# Patient Record
Sex: FEMALE | Race: OTHER | Employment: FULL TIME | URBAN - METROPOLITAN AREA
[De-identification: names, ages, dates, MRNs, and addresses within clinical notes are randomized per-mention and may not be internally consistent; named-entity substitution may affect disease eponyms.]

---

## 2018-02-25 ENCOUNTER — HOSPITAL ENCOUNTER (INPATIENT)
Age: 63
LOS: 9 days | Discharge: HOME | DRG: 885 | End: 2018-03-06
Attending: PSYCHIATRY & NEUROLOGY | Admitting: PSYCHIATRY & NEUROLOGY
Payer: COMMERCIAL

## 2018-02-25 ENCOUNTER — APPOINTMENT (OUTPATIENT)
Dept: CT IMAGING | Age: 63
End: 2018-02-25
Attending: EMERGENCY MEDICINE
Payer: MEDICAID

## 2018-02-25 ENCOUNTER — HOSPITAL ENCOUNTER (EMERGENCY)
Age: 63
Discharge: PSYCHIATRIC OR SUBSTANCE USE FACILITY | End: 2018-02-25
Attending: EMERGENCY MEDICINE
Payer: MEDICAID

## 2018-02-25 VITALS
OXYGEN SATURATION: 100 % | DIASTOLIC BLOOD PRESSURE: 70 MMHG | HEART RATE: 78 BPM | RESPIRATION RATE: 11 BRPM | SYSTOLIC BLOOD PRESSURE: 114 MMHG | HEIGHT: 66 IN | WEIGHT: 170 LBS | TEMPERATURE: 98.6 F | BODY MASS INDEX: 27.32 KG/M2

## 2018-02-25 DIAGNOSIS — E87.6 HYPOKALEMIA: ICD-10-CM

## 2018-02-25 DIAGNOSIS — F22 PARANOIA (PSYCHOSIS) (HCC): Primary | ICD-10-CM

## 2018-02-25 DIAGNOSIS — R03.0 ELEVATED BLOOD PRESSURE READING: ICD-10-CM

## 2018-02-25 PROBLEM — F29 PSYCHOTIC DISORDER (HCC): Status: ACTIVE | Noted: 2018-02-25

## 2018-02-25 LAB
ALBUMIN SERPL-MCNC: 3.8 G/DL (ref 3.5–5)
ALBUMIN/GLOB SERPL: 0.9 {RATIO} (ref 1.1–2.2)
ALP SERPL-CCNC: 40 U/L (ref 45–117)
ALT SERPL-CCNC: 30 U/L (ref 12–78)
AMPHET UR QL SCN: NEGATIVE
ANION GAP SERPL CALC-SCNC: 8 MMOL/L (ref 5–15)
APAP SERPL-MCNC: <2 UG/ML (ref 10–30)
APPEARANCE UR: CLEAR
AST SERPL-CCNC: 22 U/L (ref 15–37)
BACTERIA URNS QL MICRO: NEGATIVE /HPF
BARBITURATES UR QL SCN: NEGATIVE
BASOPHILS # BLD: 0 K/UL (ref 0–0.1)
BASOPHILS NFR BLD: 1 % (ref 0–1)
BENZODIAZ UR QL: NEGATIVE
BILIRUB SERPL-MCNC: 0.6 MG/DL (ref 0.2–1)
BILIRUB UR QL: NEGATIVE
BUN SERPL-MCNC: 12 MG/DL (ref 6–20)
BUN/CREAT SERPL: 11 (ref 12–20)
CALCIUM SERPL-MCNC: 8.9 MG/DL (ref 8.5–10.1)
CANNABINOIDS UR QL SCN: NEGATIVE
CHLORIDE SERPL-SCNC: 107 MMOL/L (ref 97–108)
CO2 SERPL-SCNC: 27 MMOL/L (ref 21–32)
COCAINE UR QL SCN: NEGATIVE
COLOR UR: ABNORMAL
CREAT SERPL-MCNC: 1.14 MG/DL (ref 0.55–1.02)
DIFFERENTIAL METHOD BLD: NORMAL
DRUG SCRN COMMENT,DRGCM: NORMAL
EOSINOPHIL # BLD: 0 K/UL (ref 0–0.4)
EOSINOPHIL NFR BLD: 0 % (ref 0–7)
EPITH CASTS URNS QL MICRO: ABNORMAL /LPF
ERYTHROCYTE [DISTWIDTH] IN BLOOD BY AUTOMATED COUNT: 12.9 % (ref 11.5–14.5)
ETHANOL SERPL-MCNC: <10 MG/DL
GLOBULIN SER CALC-MCNC: 4.1 G/DL (ref 2–4)
GLUCOSE SERPL-MCNC: 91 MG/DL (ref 65–100)
GLUCOSE UR STRIP.AUTO-MCNC: NEGATIVE MG/DL
HCT VFR BLD AUTO: 38.8 % (ref 35–47)
HGB BLD-MCNC: 13 G/DL (ref 11.5–16)
HGB UR QL STRIP: NEGATIVE
HYALINE CASTS URNS QL MICRO: ABNORMAL /LPF (ref 0–5)
IMM GRANULOCYTES # BLD: 0 K/UL (ref 0–0.04)
IMM GRANULOCYTES NFR BLD AUTO: 0 % (ref 0–0.5)
KETONES UR QL STRIP.AUTO: ABNORMAL MG/DL
LEUKOCYTE ESTERASE UR QL STRIP.AUTO: ABNORMAL
LYMPHOCYTES # BLD: 1 K/UL (ref 0.8–3.5)
LYMPHOCYTES NFR BLD: 22 % (ref 12–49)
MCH RBC QN AUTO: 32.7 PG (ref 26–34)
MCHC RBC AUTO-ENTMCNC: 33.5 G/DL (ref 30–36.5)
MCV RBC AUTO: 97.5 FL (ref 80–99)
METHADONE UR QL: NEGATIVE
MONOCYTES # BLD: 0.3 K/UL (ref 0–1)
MONOCYTES NFR BLD: 6 % (ref 5–13)
NEUTS SEG # BLD: 3.1 K/UL (ref 1.8–8)
NEUTS SEG NFR BLD: 72 % (ref 32–75)
NITRITE UR QL STRIP.AUTO: NEGATIVE
NRBC # BLD: 0 K/UL (ref 0–0.01)
NRBC BLD-RTO: 0 PER 100 WBC
OPIATES UR QL: NEGATIVE
PCP UR QL: NEGATIVE
PH UR STRIP: 5.5 [PH] (ref 5–8)
PLATELET # BLD AUTO: 246 K/UL (ref 150–400)
PMV BLD AUTO: 10.6 FL (ref 8.9–12.9)
POTASSIUM SERPL-SCNC: 3.4 MMOL/L (ref 3.5–5.1)
PROT SERPL-MCNC: 7.9 G/DL (ref 6.4–8.2)
PROT UR STRIP-MCNC: ABNORMAL MG/DL
RBC # BLD AUTO: 3.98 M/UL (ref 3.8–5.2)
RBC #/AREA URNS HPF: ABNORMAL /HPF (ref 0–5)
SALICYLATES SERPL-MCNC: <1.7 MG/DL (ref 2.8–20)
SODIUM SERPL-SCNC: 142 MMOL/L (ref 136–145)
SP GR UR REFRACTOMETRY: 1.01 (ref 1–1.03)
TSH SERPL DL<=0.05 MIU/L-ACNC: 1.4 UIU/ML (ref 0.36–3.74)
UA: UC IF INDICATED,UAUC: ABNORMAL
UROBILINOGEN UR QL STRIP.AUTO: 0.2 EU/DL (ref 0.2–1)
WBC # BLD AUTO: 4.4 K/UL (ref 3.6–11)
WBC URNS QL MICRO: ABNORMAL /HPF (ref 0–4)

## 2018-02-25 PROCEDURE — 74011250637 HC RX REV CODE- 250/637: Performed by: EMERGENCY MEDICINE

## 2018-02-25 PROCEDURE — 70450 CT HEAD/BRAIN W/O DYE: CPT

## 2018-02-25 PROCEDURE — 80053 COMPREHEN METABOLIC PANEL: CPT | Performed by: EMERGENCY MEDICINE

## 2018-02-25 PROCEDURE — 80307 DRUG TEST PRSMV CHEM ANLYZR: CPT | Performed by: EMERGENCY MEDICINE

## 2018-02-25 PROCEDURE — 99285 EMERGENCY DEPT VISIT HI MDM: CPT

## 2018-02-25 PROCEDURE — 85025 COMPLETE CBC W/AUTO DIFF WBC: CPT | Performed by: EMERGENCY MEDICINE

## 2018-02-25 PROCEDURE — 65220000003 HC RM SEMIPRIVATE PSYCH

## 2018-02-25 PROCEDURE — 84443 ASSAY THYROID STIM HORMONE: CPT | Performed by: EMERGENCY MEDICINE

## 2018-02-25 PROCEDURE — 81001 URINALYSIS AUTO W/SCOPE: CPT | Performed by: EMERGENCY MEDICINE

## 2018-02-25 PROCEDURE — 36415 COLL VENOUS BLD VENIPUNCTURE: CPT | Performed by: EMERGENCY MEDICINE

## 2018-02-25 RX ORDER — LORAZEPAM 1 MG/1
1 TABLET ORAL
Status: DISCONTINUED | OUTPATIENT
Start: 2018-02-25 | End: 2018-03-06 | Stop reason: HOSPADM

## 2018-02-25 RX ORDER — POTASSIUM CHLORIDE 20 MEQ/1
40 TABLET, EXTENDED RELEASE ORAL
Status: COMPLETED | OUTPATIENT
Start: 2018-02-25 | End: 2018-02-25

## 2018-02-25 RX ORDER — IBUPROFEN 200 MG
1 TABLET ORAL
Status: DISCONTINUED | OUTPATIENT
Start: 2018-02-25 | End: 2018-03-06 | Stop reason: HOSPADM

## 2018-02-25 RX ORDER — LISINOPRIL 20 MG/1
20 TABLET ORAL DAILY
COMMUNITY
End: 2018-03-06

## 2018-02-25 RX ORDER — ZOLPIDEM TARTRATE 5 MG/1
5 TABLET ORAL
Status: DISCONTINUED | OUTPATIENT
Start: 2018-02-25 | End: 2018-03-06 | Stop reason: HOSPADM

## 2018-02-25 RX ORDER — OLANZAPINE 5 MG/1
5 TABLET ORAL
Status: DISCONTINUED | OUTPATIENT
Start: 2018-02-25 | End: 2018-03-06 | Stop reason: HOSPADM

## 2018-02-25 RX ORDER — ACETAMINOPHEN 325 MG/1
650 TABLET ORAL
Status: DISCONTINUED | OUTPATIENT
Start: 2018-02-25 | End: 2018-03-06 | Stop reason: HOSPADM

## 2018-02-25 RX ORDER — LORAZEPAM 2 MG/ML
2 INJECTION INTRAMUSCULAR
Status: DISCONTINUED | OUTPATIENT
Start: 2018-02-25 | End: 2018-03-06 | Stop reason: HOSPADM

## 2018-02-25 RX ORDER — CAPTOPRIL 25 MG/1
25 TABLET ORAL
Status: COMPLETED | OUTPATIENT
Start: 2018-02-25 | End: 2018-02-25

## 2018-02-25 RX ORDER — IBUPROFEN 400 MG/1
400 TABLET ORAL
Status: DISCONTINUED | OUTPATIENT
Start: 2018-02-25 | End: 2018-03-06 | Stop reason: HOSPADM

## 2018-02-25 RX ORDER — BENZTROPINE MESYLATE 2 MG/1
2 TABLET ORAL
Status: DISCONTINUED | OUTPATIENT
Start: 2018-02-25 | End: 2018-03-06 | Stop reason: HOSPADM

## 2018-02-25 RX ORDER — ADHESIVE BANDAGE
30 BANDAGE TOPICAL DAILY PRN
Status: DISCONTINUED | OUTPATIENT
Start: 2018-02-25 | End: 2018-03-06 | Stop reason: HOSPADM

## 2018-02-25 RX ORDER — OMEPRAZOLE 20 MG/1
20 CAPSULE, DELAYED RELEASE ORAL 3 TIMES DAILY
COMMUNITY
End: 2018-03-06

## 2018-02-25 RX ORDER — BENZTROPINE MESYLATE 1 MG/ML
2 INJECTION INTRAMUSCULAR; INTRAVENOUS
Status: DISCONTINUED | OUTPATIENT
Start: 2018-02-25 | End: 2018-03-06 | Stop reason: HOSPADM

## 2018-02-25 RX ORDER — ACETAMINOPHEN AND CODEINE PHOSPHATE 300; 30 MG/1; MG/1
1 TABLET ORAL
Status: ON HOLD | COMMUNITY
End: 2018-02-26

## 2018-02-25 RX ADMIN — POTASSIUM CHLORIDE 40 MEQ: 20 TABLET, EXTENDED RELEASE ORAL at 11:49

## 2018-02-25 RX ADMIN — CAPTOPRIL 25 MG: 25 TABLET ORAL at 11:30

## 2018-02-25 NOTE — ED NOTES
Pt resting in position of comfort with eyes closed. ED [facility] and [facility] remain at bedside for 1:1 observation. No acute distress noted. Will continue to monitor.

## 2018-02-25 NOTE — BH NOTES
Patient arrived on the unit by wheelchair. Accompanied by two police officers. Patient is alert, calm and verbal.  Denies suicidal and homicidal ideations. Contracted verbally for safety. Belongings secured. Patient is cooperative. Will continue to monitor. Skin Assessment:    46 SAMANTA Golden (RN) and  Aiyana Santana (RN) performed a dual skin assessment on this patient. No impairment noted. Pressure Ulcer Documentation   (COMPLETE ONE LABEL PER PRESSURE ULCER)   For further information, please review corresponding Wound Care flowsheet. No pressure ulcer noted and pressure ulcer prevention initiated.      Josue Nam, RN

## 2018-02-25 NOTE — IP AVS SNAPSHOT
1111 [facility] [address] [place] [address] 
[phone] Patient: Gerardo Howell MRN: [id number] CQR:6/95/6447 A check tam indicates which time of day the medication should be taken. My Medications START taking these medications Instructions Each Dose to Equal  
 Morning Noon Evening Bedtime  
 benztropine 1 mg tablet Commonly known as:  COGENTIN Take 1 Tab by mouth two (2) times a day. Indications: extrapyramidal disease 1 mg  
    
3/7/2018  tomorrow Today 3/6/2018  
  
 pantoprazole 40 mg tablet Commonly known as:  PROTONIX Start taking on:  3/7/2018 Take 1 Tab by mouth Daily (before breakfast). Indications: gastroesophageal reflux disease 40 mg Tomorrow 3/7/2018  
   
   
   
  
 risperiDONE 4 mg tablet Commonly known as:  RisperDAL Take 1 Tab by mouth nightly. Indications: Jennifer associated with Bipolar Disorder 4 mg Today 3/6/2018 CONTINUE taking these medications Instructions Each Dose to Equal  
 Morning Noon Evening Bedtime  
 fluticasone 50 mcg/actuation nasal spray Commonly known as:  Myna Heller 2 Sprays by Both Nostrils route daily. 2 [place] Tomorrow 3/7/2018  
   
   
   
  
 lisinopril 20 mg tablet Commonly known as:  Luetta Manzanilla Start taking on:  3/7/2018 Take 1 Tab by mouth daily. Indications: hypertension 20 mg Tomorrow 3/7/2018 STOP taking these medications   
 omeprazole 20 mg capsule Commonly known as:  PRILOSEC  
   
  
 predniSONE 20 mg tablet Commonly known as:  [employer] Where to Get Your Medications These medications were sent to [facility]ar Leone, [address], [address] [place] [address] Phone:  [phone]  
  benztropine 1 mg tablet  
 lisinopril 20 mg tablet  
 pantoprazole 40 mg tablet risperiDONE 4 mg tablet

## 2018-02-25 NOTE — ED NOTES
Have not yet received BP medication from central pharmacy. Spoke with Pharmacist who states he will send the medication to the ED as soon as possible.

## 2018-02-25 NOTE — ED NOTES
Assumed care of patient. Patient placed in position of comfort. Skin warm and dry. Respirations even and unlabored. In no apparent distress at this time. Presents to the ED via Police escort as an [address] [place] [address]; handcuffs in [address] is aware. Pt was at a homeless shelter taking people's blankets because she thought they were \"demonized. \" Police also states, \"she grabbed a gallon of white vinegar and started drinking it because she thought it would purify her. \" Per police, pt also expressed paranoia about the \"KKK coming to get her. \" ED tech and [place] [employer] at bedside for continuous 1:1 observation. Pt removed her clothing and placed items into a patient belonging bag; paper scrubs provided. Environmental safety check performed. Will continue to monitor.

## 2018-02-25 NOTE — IP AVS SNAPSHOT
[address] 
[phone] Patient: Charu Rodriguez MRN: [id number] HCC:8/17/5610 About your hospitalization You were admitted on:  February 25, 2018 You last received care in the:  [facility] You were discharged on:  March 6, 2018 Why you were hospitalized Your primary diagnosis was:  Psychotic Disorder Follow-up Information Follow up With Details Comments Contact Info [id number]  Go on 3/7/2018 walk in for [address] Phone: [phone] Bianca Sanchez MD   Patient can only remember the practice name and not the physician Discharge Orders None A check tam indicates which time of day the medication should be taken. My Medications START taking these medications Instructions Each Dose to Equal  
 Morning Noon Evening Bedtime  
 benztropine 1 mg tablet Commonly known as:  COGENTIN Take 1 Tab by mouth two (2) times a day. Indications: extrapyramidal disease 1 mg  
    
3/7/2018  tomorrow Today 3/6/2018  
  
 pantoprazole 40 mg tablet Commonly known as:  PROTONIX Start taking on:  3/7/2018 Take 1 Tab by mouth Daily (before breakfast). Indications: gastroesophageal reflux disease 40 mg Tomorrow 3/7/2018  
   
   
   
  
 risperiDONE 4 mg tablet Commonly known as:  RisperDAL Take 1 Tab by mouth nightly. Indications: Jennifer associated with Bipolar Disorder 4 mg Today 3/6/2018 CONTINUE taking these medications Instructions Each Dose to Equal  
 Morning Noon Evening Bedtime  
 fluticasone 50 mcg/actuation nasal spray Commonly known as:  [place] Cobb 2 Sprays by Both Nostrils route daily. 2 [place] Tomorrow 3/7/2018  
   
   
   
  
 lisinopril 20 mg tablet Commonly known as:  [employer] Start taking on:  3/7/2018 Take 1 Tab by mouth daily. Indications: hypertension 20 mg Tomorrow 3/7/2018 STOP taking these medications   
 omeprazole 20 mg capsule Commonly known as:  PRILOSEC  
   
  
 predniSONE 20 mg tablet Commonly known as:  Annamaria Sousa Where to Get Your Medications These medications were sent to [address] Aditya Leone, [address], [address] [place] [address] Phone:  [phone]  
  benztropine 1 mg tablet  
 lisinopril 20 mg tablet  
 pantoprazole 40 mg tablet  
 risperiDONE 4 mg tablet Discharge Instructions DISCHARGE SUMMARY 
 
Tomy Schwab : 1955 MRN: [id number] The patient Augusto Romano exhibits the ability to control behavior in a less restrictive environment. Patient's level of functioning is improving. No assaultive/destructive behavior has been observed for the past 24 hours. No suicidal/homicidal threat or behavior has been observed for the past 24 hours. There is no evidence of serious medication side effects. Patient has not been in physical or protective restraints for at least the past 24 hours. If weapons involved, how are they secured? No weapons involved Is patient aware of and in agreement with discharge plan? Patient is aware of discharged and is in agreement Arrangements for medication:  Prescriptions filled at [facility] . Referral for substance abuse treatment ? no Referral for smoking cessation needed ? no 
 
Copy of discharge instructions to  provider?:  Yes Arrangements for transportation home:  Friend to  Keep all follow up appointments as scheduled, continue to take prescribed medications per physician instructions. Mental health crisis number:  912 or your local mental health crisis line number at [phone] DISCHARGE SUMMARY from Nurse Prescriptions filled PATIENT INSTRUCTIONS: 
 
 
 
What to do at Home: 
Recommended activity: Activity as tolerated, *  Please give a list of your current medications to your Primary Care Provider. *  Please update this list whenever your medications are discontinued, doses are 
    changed, or new medications (including over-the-counter products) are added. *  Please carry medication information at all times in case of emergency situations. These are general instructions for a healthy lifestyle: No smoking/ No tobacco products/ Avoid exposure to second hand smoke Surgeon General's Warning:  Quitting smoking now greatly reduces serious risk to your health. Obesity, smoking, and sedentary lifestyle greatly increases your risk for illness A healthy diet, regular physical exercise & weight monitoring are important for maintaining a healthy lifestyle You may be retaining fluid if you have a history of heart failure or if you experience any of the following symptoms:  Weight gain of 3 pounds or more overnight or 5 pounds in a week, increased swelling in our hands or feet or shortness of breath while lying flat in bed. Please call your doctor as soon as you notice any of these symptoms; do not wait until your next office visit. Recognize signs and symptoms of STROKE: 
 
F-face looks uneven A-arms unable to move or move unevenly S-speech slurred or non-existent T-time-call 911 as soon as signs and symptoms begin-DO NOT go Back to bed or wait to see if you get better-TIME IS BRAIN. Warning Signs of HEART ATTACK Call 911 if you have these symptoms: 
? Chest discomfort. Most heart attacks involve discomfort in the center of the chest that lasts more than a few minutes, or that goes away and comes back. It can feel like uncomfortable pressure, squeezing, fullness, or pain. ? Discomfort in other areas of the upper body. Symptoms can include pain or discomfort in one or both arms, the back, neck, jaw, or stomach. ? Shortness of breath with or without chest discomfort. ? Other signs may include breaking out in a cold sweat, nausea, or lightheadedness. Don't wait more than five minutes to call [address]! Fast action can save your life. Calling 911 is almost always the fastest way to get lifesaving treatment. Emergency Medical Services staff can begin treatment when they arrive  up to an hour sooner than if someone gets to the hospital by car. The discharge information has been reviewed with the patient. The patient verbalized understanding. Discharge medications reviewed with the patient and appropriate educational materials and side effects teaching were provided. [employer] Activation Thank you for requesting access to [employer]. Please follow the instructions below to securely access and download your online medical record. [employer] allows you to send messages to your doctor, view your test results, renew your prescriptions, schedule appointments, and more. How Do I Sign Up? 1. In your internet browser, go to [email or web address] 
2. Click on the First Time User? Click Here link in the Sign In box. You will be redirect to the New Member Sign Up page. 3. Enter your [employer] Access Code exactly as it appears below. You will not need to use this code after youve completed the sign-up process. If you do not sign up before the expiration date, you must request a new code. [employer] Access Code: [email or web address] Expires: 2018  8:50 AM (This is the date your [employer] access code will ) 4. Enter the last four digits of your Social Security Number (xxxx) and Date of Birth (mm/dd/yyyy) as indicated and click Submit. You will be taken to the next sign-up page. 5. Create a [employer] ID.  This will be your [employer] login ID and cannot be changed, so think of one that is secure and easy to remember. 6. Create a [employer] password. You can change your password at any time. 7. Enter your Password Reset Question and Answer. This can be used at a later time if you forget your password. 8. Enter your e-mail address. You will receive e-mail notification when new information is available in [address] 9. Click Sign Up. You can now view and download portions of your medical record. 10. Click the Download Summary menu link to download a portable copy of your medical information. Additional Information If you have questions, please visit the Frequently Asked Questions section of the [employer] website at [email or web address] Remember, [employer] is NOT to be used for urgent needs. For medical emergencies, dial 911. 
 
 
 
___________________________________________________________________________________________________________________________________ Introducing [facility]! [employer] introduces [employer] patient portal. Now you can access parts of your medical record, email your doctor's office, and request medication refills online. 1. In your internet browser, go to [email or web address] 2. Click on the First Time User? Click Here link in the Sign In box. You will see the New Member Sign Up page. 3. Enter your [employer] Access Code exactly as it appears below. You will not need to use this code after youve completed the sign-up process. If you do not sign up before the expiration date, you must request a new code. · [employer] Access Code: [email or web address] Expires: 5/26/2018  8:50 AM 
 
4. Enter the last four digits of your Social Security Number (xxxx) and Date of Birth (mm/dd/yyyy) as indicated and click Submit. You will be taken to the next sign-up page. 5. Create a MyChart ID. This will be your [employer] login ID and cannot be changed, so think of one that is secure and easy to remember. 6. Create a [employer] password. You can change your password at any time. 7. Enter your Password Reset Question and Answer. This can be used at a later time if you forget your password. 8. Enter your e-mail address. You will receive e-mail notification when new information is available in [address] 9. Click Sign Up. You can now view and download portions of your medical record. 10. Click the Download Summary menu link to download a portable copy of your medical information. If you have questions, please visit the Frequently Asked Questions section of the [employer] website. Remember, [employer] is NOT to be used for urgent needs. For medical emergencies, dial 911. Now available from your iPhone and Android! Providers Seen During Your Hospitalization Provider Specialty Primary office phone Godfrey Garcia MD Psychiatry [phone] Christofer Acosta MD Psychiatry [phone] Your Primary Care Physician (PCP) Primary Care Physician Office Phone Office Fax OTHER, PHYS ** None ** ** None ** You are allergic to the following No active allergies Recent Documentation Height Weight Breastfeeding? BMI OB Status Smoking Status 1.676 m 75.8 kg No 26.97 kg/m2 Postmenopausal Never Smoker Emergency Contacts Name Discharge Info Relation Home Work Mobile Andrew Morel NO [2] Friend [5]   [phone] Patient Belongings The following personal items are in your possession at time of discharge: 
  Dental Appliances: None  Visual Aid: Glasses      Home Medications: None   Jewelry: None  Clothing: At bedside, Pants (2 leggins )    Other Valuables: None  Personal Items Sent to Safe: none Please provide this summary of care documentation to your next provider. Signatures-by signing, you are acknowledging that this After Visit Summary has been reviewed with you and you have received a copy.   
  
 
  
    
    
 Patient Signature: ____________________________________________________________ Date:  ____________________________________________________________  
  
Cherylle Cumins Provider Signature:  ____________________________________________________________ Date:  ____________________________________________________________

## 2018-02-25 NOTE — ED NOTES
Hourly rounds completed. All concerns and needs addressed by RN as requested by the pt. In no apparent distress at this time. Resting in position of comfort. Will continue to monitor.

## 2018-02-25 NOTE — BH NOTES
TRANSFER - IN REPORT:    Verbal report received from [address] [facility], [address] on Charu Rodriguez  being received from [facility] ER (unit) for routine progression of care      Report consisted of patients Situation, Background, Assessment and   Recommendations(SBAR). Information from the following report(s) SBAR was reviewed with the receiving nurse. Opportunity for questions and clarification was provided. Assessment completed upon patients arrival to unit and care assumed.

## 2018-02-25 NOTE — ED NOTES
Skin warm and dry. Respirations even and unlabored. In no apparent distress at this time. Pt ambulatory out of the ED with handcuffs to BL wrists, escorted by [facility]. Pt transferred to [facility] for psychiatric admission. All of the patient's belongings are being transported with the patient.

## 2018-02-25 NOTE — ED NOTES
TRANSFER - OUT REPORT:    Verbal report given to Deneen Moreno RN (name) on Amanda Baxter  being transferred to [address] [place] [address] for psychiatric admission. Report consisted of patients Situation, Background, Assessment and   Recommendations(SBAR). Information from the following report(s) SBAR was reviewed with the receiving nurse. Lines:       Opportunity for questions and clarification was provided. Patient transported with all of her belongings.

## 2018-02-25 NOTE — BH NOTES
5099 - Triage hospitalist paged for history and physical.     2045 - Triage hospitalist paged for history and physical.

## 2018-02-25 NOTE — IP AVS SNAPSHOT
Summary of Care Report The Summary of Care report has been created to help improve care coordination. Users with access to [employer] or [address] (Web-based application) may access additional patient information including the Discharge Summary. If you are not currently a [address] user and need more information, please call the number listed below in the [place] [address] section and ask to be connected with Medical Records. Facility Information Name Address Phone [address] [facility] 7 [phone] Patient Information Patient Name Sex JOSELIN Rao ([id number]) Female 1955 Discharge Information Admitting Provider Service Area Unit Janet Gold MD / Kwasi Owus[facility] [facility] / [phone] Discharge Provider Discharge Date/Time Discharge Disposition Destination (none) 3/6/2018 Afternoon (Pending) AHR (none) Patient Language Language ENGLISH [13] Hospital Problems as of 3/6/2018  Never Reviewed Class Noted - Resolved Last Modified POA Active Problems * (Principal)Psychotic disorder  2018 - Present 2018 by Brandon Pete MD Yes Entered by Janet Gold MD  
  
You are allergic to the following No active allergies Current Discharge Medication List  
  
START taking these medications Dose & Instructions Dispensing Information Comments  
 benztropine 1 mg tablet Commonly known as:  COGENTIN Dose:  1 mg Take 1 Tab by mouth two (2) times a day. Indications: extrapyramidal disease Quantity:  60 Tab Refills:  0  
   
 pantoprazole 40 mg tablet Commonly known as:  PROTONIX Start taking on:  3/7/2018 Dose:  40 mg Take 1 Tab by mouth Daily (before breakfast). Indications: gastroesophageal reflux disease Quantity:  30 Tab Refills:  0  
   
 risperiDONE 4 mg tablet Commonly known as:  RisperDAL Dose:  4 mg Take 1 Tab by mouth nightly. Indications: Jennifer associated with Bipolar Disorder Quantity:  30 Tab Refills:  0 CONTINUE these medications which have NOT CHANGED Dose & Instructions Dispensing Information Comments  
 fluticasone 50 mcg/actuation nasal spray Commonly known as:  Delmon Pickup Dose:  2 Spray 2 Sprays by Both Nostrils route daily. Refills:  0  
   
 lisinopril 20 mg tablet Commonly known as:  Enrigue Sails Start taking on:  3/7/2018 Dose:  20 mg Take 1 Tab by mouth daily. Indications: hypertension Quantity:  30 Tab Refills:  0 STOP taking these medications Comments  
 omeprazole 20 mg capsule Commonly known as:  PRILOSEC  
   
   
 predniSONE 20 mg tablet Commonly known as:  Missy Kimball Follow-up Information Follow up With Details Comments Contact Info [address]  Go on 3/7/2018 walk in for [address] Phone: [phone] Phys MD Daniel   Patient can only remember the practice name and not the physician Discharge Instructions DISCHARGE SUMMARY 
 
Sarah Osman : 1955 MRN: [id number] The patient [facility] exhibits the ability to control behavior in a less restrictive environment. Patient's level of functioning is improving. No assaultive/destructive behavior has been observed for the past 24 hours. No suicidal/homicidal threat or behavior has been observed for the past 24 hours. There is no evidence of serious medication side effects. Patient has not been in physical or protective restraints for at least the past 24 hours. If weapons involved, how are they secured? No weapons involved Is patient aware of and in agreement with discharge plan?  Patient is aware of discharged and is in agreement Arrangements for medication:  Prescriptions filled at [facility] . Referral for substance abuse treatment ? no Referral for smoking cessation needed ? no 
 
Copy of discharge instructions to  provider?:  Yes Arrangements for transportation home:  Friend to  Keep all follow up appointments as scheduled, continue to take prescribed medications per physician instructions. Mental health crisis number:  503 or your local mental health crisis line number at [phone] DISCHARGE SUMMARY from Nurse Prescriptions filled PATIENT INSTRUCTIONS: 
 
 
 
What to do at Home: 
Recommended activity: Activity as tolerated, *  Please give a list of your current medications to your Primary Care Provider. *  Please update this list whenever your medications are discontinued, doses are 
    changed, or new medications (including over-the-counter products) are added. *  Please carry medication information at all times in case of emergency situations. These are general instructions for a healthy lifestyle: No smoking/ No tobacco products/ Avoid exposure to second hand smoke Surgeon General's Warning:  Quitting smoking now greatly reduces serious risk to your health. Obesity, smoking, and sedentary lifestyle greatly increases your risk for illness A healthy diet, regular physical exercise & weight monitoring are important for maintaining a healthy lifestyle You may be retaining fluid if you have a history of heart failure or if you experience any of the following symptoms:  Weight gain of 3 pounds or more overnight or 5 pounds in a week, increased swelling in our hands or feet or shortness of breath while lying flat in bed. Please call your doctor as soon as you notice any of these symptoms; do not wait until your next office visit. Recognize signs and symptoms of STROKE: 
 
F-face looks uneven A-arms unable to move or move unevenly S-speech slurred or non-existent T-time-call 911 as soon as signs and symptoms begin-DO NOT go Back to bed or wait to see if you get better-TIME IS BRAIN. Warning Signs of HEART ATTACK Call 911 if you have these symptoms: 
? Chest discomfort. Most heart attacks involve discomfort in the center of the chest that lasts more than a few minutes, or that goes away and comes back. It can feel like uncomfortable pressure, squeezing, fullness, or pain. ? Discomfort in other areas of the upper body. Symptoms can include pain or discomfort in one or both arms, the back, neck, jaw, or stomach. ? Shortness of breath with or without chest discomfort. ? Other signs may include breaking out in a cold sweat, nausea, or lightheadedness. Don't wait more than five minutes to call [address]! Fast action can save your life. Calling 911 is almost always the fastest way to get lifesaving treatment. Emergency Medical Services staff can begin treatment when they arrive  up to an hour sooner than if someone gets to the hospital by car. The discharge information has been reviewed with the patient. The patient verbalized understanding. Discharge medications reviewed with the patient and appropriate educational materials and side effects teaching were provided. [employer] Activation Thank you for requesting access to [employer]. Please follow the instructions below to securely access and download your online medical record. [employer] allows you to send messages to your doctor, view your test results, renew your prescriptions, schedule appointments, and more. How Do I Sign Up? 1. In your internet browser, go to [email or web address] 
2. Click on the First Time User? Click Here link in the Sign In box. You will be redirect to the New Member Sign Up page. 3. Enter your [employer] Access Code exactly as it appears below.  You will not need to use this code after youve completed the sign-up process. If you do not sign up before the expiration date, you must request a new code. [employer] Access Code: [email or web address] Expires: 2018  8:50 AM (This is the date your [employer] access code will ) 4. Enter the last four digits of your Social Security Number (xxxx) and Date of Birth (mm/dd/yyyy) as indicated and click Submit. You will be taken to the next sign-up page. 5. Create a [employer] ID. This will be your [employer] login ID and cannot be changed, so think of one that is secure and easy to remember. 6. Create a [employer] password. You can change your password at any time. 7. Enter your Password Reset Question and Answer. This can be used at a later time if you forget your password. 8. Enter your e-mail address. You will receive e-mail notification when new information is available in [facility] [address] 9. Click Sign Up. You can now view and download portions of your medical record. 10. Click the Download Summary menu link to download a portable copy of your medical information. Additional Information If you have questions, please visit the Frequently Asked Questions section of the [employer] website at [email or web address] Remember, [employer] is NOT to be used for urgent needs. For medical emergencies, dial 911. 
 
 
 
___________________________________________________________________________________________________________________________________ Chart Review Routing History No Routing History on File

## 2018-02-25 NOTE — ED NOTES
Pt sitting in bed eating lunch without difficulty. Handcuffs remain in place to BL upper extremities. [facility] remain at bedside for continuous 1:1 observation.

## 2018-02-25 NOTE — ED PROVIDER NOTES
EMERGENCY DEPARTMENT HISTORY AND PHYSICAL EXAM      Date: 2/25/2018  Patient Name: Yomi Lind    History of Presenting Illness     Chief Complaint   Patient presents with    Mental Health Problem     Presents to the ED via Police escort as an ECO; handcuffs in place. Pt was at a homeless shelter taking people's blankets because she thought they were \"demonized. \" Police also states, \"she grabbed a gallon of white vinegar and started drinking it because she thought it would purify her. \" Per police, pt also expressed paranoia about the \"KKK coming to get her. \"        History Provided By: Patient and police notes. HPI: Yomi Lind, 65 y.o. female with PMHx significant for HTN, presents via police escort under ECO to the ED for evaluation. Per police report, pt travelled from [place] to [place] in [place], [place] as she believes she is being pursued by the [facility]. The staff at the [facility] called the police due to her stealing other people's blankets, because they were demonized, and noted that she was drinking a gallon of white vinegar to purify herself. 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 She adds that she was preaching to this woman earlier this week which might have caused her  to pursue her. Lastly, she bought a bus ticket to [address] [place] [address] out of safety, and did note feeling safe during her stay at the [facility]. PCP: Bianca Sanchez MD    All elements of  HPI are unable to be obtained due to pt's mental status. Current Outpatient Prescriptions   Medication Sig Dispense Refill    lisinopril (PRINIVIL, ZESTRIL) 20 mg tablet Take 20 mg by mouth daily.  acetaminophen-codeine (TYLENOL #3) 300-30 mg per tablet Take 1 Tab by mouth every six (6) hours as needed for Pain.  omeprazole (PRILOSEC) 20 mg capsule Take 20 mg by mouth three (3) times daily.          Past History     Past Medical History:  Past Medical History:   Diagnosis Date    Hypertension     Ill-defined condition     GERD       Past Surgical History:  History reviewed. No pertinent surgical history. Family History:  History reviewed. No pertinent family history. Social History:  Social History   Substance Use Topics    Smoking status: Never Smoker    Smokeless tobacco: None    Alcohol use No       Allergies:  No Known Allergies      Review of Systems   Review of Systems   Unable to perform ROS: Other (psychosis)       Physical Exam   Physical Exam  Nursing note and vitals reviewed. General appearance: non-toxic, handcuffed in stretcher  Eyes: PERRL, EOMI, conjunctiva normal, anicteric sclera  HEENT: mucous membranes moist, oropharynx is clear  Pulmonary: clear to auscultation bilaterally  Cardiac: normal rate and regular rhythm, no murmurs, gallops, or rubs, 2+DP pulses, 2+ radial pulses  Abdomen: soft, nontender, nondistended, bowel sounds present  MSK: no pre-tibial edema  Neuro: Alert, awake, expresses thoughts of paranoia, discussing the KKK coming after her, people targeting her in [employer], staying in [place] in [address] [place] [address]  Skin: capillary refill brisk    Diagnostic Study Results     Labs -     Recent Results (from the past 12 hour(s))   CBC WITH AUTOMATED DIFF    Collection Time: 02/25/18  9:22 AM   Result Value Ref Range    WBC 4.4 3.6 - 11.0 K/uL    RBC 3.98 3.80 - 5.20 M/uL    HGB 13.0 11.5 - 16.0 g/dL    HCT 38.8 35.0 - 47.0 %    MCV 97.5 80.0 - 99.0 FL    MCH 32.7 26.0 - 34.0 PG    MCHC 33.5 30.0 - 36.5 g/dL    RDW 12.9 11.5 - 14.5 %    PLATELET 382 913 - 365 K/uL    MPV 10.6 8.9 - 12.9 FL    NRBC 0.0 0  WBC    ABSOLUTE NRBC 0.00 0.00 - 0.01 K/uL    NEUTROPHILS 72 32 - 75 %    LYMPHOCYTES 22 12 - 49 %    MONOCYTES 6 5 - 13 %    EOSINOPHILS 0 0 - 7 %    BASOPHILS 1 0 - 1 %    IMMATURE GRANULOCYTES 0 0.0 - 0.5 %    ABS. NEUTROPHILS 3.1 1.8 - 8.0 K/UL    ABS. LYMPHOCYTES 1.0 0.8 - 3.5 K/UL    ABS. MONOCYTES 0.3 0.0 - 1.0 K/UL    ABS. EOSINOPHILS 0.0 0.0 - 0.4 K/UL    ABS. BASOPHILS 0.0 0.0 - 0.1 K/UL    ABS. IMM. GRANS. 0.0 0.00 - 0.04 K/UL    DF AUTOMATED     METABOLIC PANEL, COMPREHENSIVE    Collection Time: 02/25/18  9:22 AM   Result Value Ref Range    Sodium 142 136 - 145 mmol/L    Potassium 3.4 (L) 3.5 - 5.1 mmol/L    Chloride 107 97 - 108 mmol/L    CO2 27 21 - 32 mmol/L    Anion gap 8 5 - 15 mmol/L    Glucose 91 65 - 100 mg/dL    BUN 12 6 - 20 MG/DL    Creatinine 1.14 (H) 0.55 - 1.02 MG/DL    BUN/Creatinine ratio 11 (L) 12 - 20      GFR est AA 59 (L) >60 ml/min/1.73m2    GFR est non-AA 48 (L) >60 ml/min/1.73m2    Calcium 8.9 8.5 - 10.1 MG/DL    Bilirubin, total 0.6 0.2 - 1.0 MG/DL    ALT (SGPT) 30 12 - 78 U/L    AST (SGOT) 22 15 - 37 U/L    Alk.  phosphatase 40 (L) 45 - 117 U/L    Protein, total 7.9 6.4 - 8.2 g/dL    Albumin 3.8 3.5 - 5.0 g/dL    Globulin 4.1 (H) 2.0 - 4.0 g/dL    A-G Ratio 0.9 (L) 1.1 - 2.2     ETHYL ALCOHOL    Collection Time: 02/25/18  9:22 AM   Result Value Ref Range    ALCOHOL(ETHYL),SERUM <10 <10 MG/DL   DRUG SCREEN, URINE    Collection Time: 02/25/18  9:22 AM   Result Value Ref Range    AMPHETAMINES NEGATIVE  NEG      BARBITURATES NEGATIVE  NEG      BENZODIAZEPINES NEGATIVE  NEG      COCAINE NEGATIVE  NEG      METHADONE NEGATIVE  NEG      OPIATES NEGATIVE  NEG      PCP(PHENCYCLIDINE) NEGATIVE  NEG      THC (TH-CANNABINOL) NEGATIVE  NEG      Drug screen comment (NOTE)    SALICYLATE    Collection Time: 02/25/18  9:22 AM   Result Value Ref Range    Salicylate level <0.8 (L) 2.8 - 20.0 MG/DL   ACETAMINOPHEN    Collection Time: 02/25/18  9:22 AM   Result Value Ref Range    Acetaminophen level <2 (L) 10 - 30 ug/mL   URINALYSIS W/ REFLEX CULTURE    Collection Time: 02/25/18  9:22 AM   Result Value Ref Range    Color YELLOW/STRAW      Appearance CLEAR CLEAR      Specific gravity 1.014 1.003 - 1.030      pH (UA) 5.5 5.0 - 8.0      Protein TRACE (A) NEG mg/dL    Glucose NEGATIVE  NEG mg/dL Ketone TRACE (A) NEG mg/dL    Bilirubin NEGATIVE  NEG      Blood NEGATIVE  NEG      Urobilinogen 0.2 0.2 - 1.0 EU/dL    Nitrites NEGATIVE  NEG      Leukocyte Esterase TRACE (A) NEG      WBC 0-4 0 - 4 /hpf    RBC 0-5 0 - 5 /hpf    Epithelial cells FEW FEW /lpf    Bacteria NEGATIVE  NEG /hpf    UA:UC IF INDICATED CULTURE NOT INDICATED BY UA RESULT CNI      Hyaline cast 0-2 0 - 5 /lpf   TSH 3RD GENERATION    Collection Time: 02/25/18  9:22 AM   Result Value Ref Range    TSH 1.40 0.36 - 3.74 uIU/mL       Radiologic Studies -   CT Results  (Last 48 hours)               02/25/18 1106  CT HEAD WO CONT Final result    Impression:  IMPRESSION: No acute findings. Narrative:  EXAM:  CT HEAD WO CONT       INDICATION:   Confusion/delirium, altered LOC, unexplained       COMPARISON: None. CONTRAST:  None. TECHNIQUE: Unenhanced CT of the head was performed using 5 mm images. Brain and   bone windows were generated. CT dose reduction was achieved through use of a   standardized protocol tailored for this examination and automatic exposure   control for dose modulation. FINDINGS:   The ventricles and sulci are normal in size, shape and configuration and   midline. There is periventricular hypoattenuation. There is no intracranial   hemorrhage, extra-axial collection, mass, mass effect or midline shift. The   basilar cisterns are open. No acute infarct is identified. The bone windows   demonstrate no abnormalities. The visualized portions of the paranasal sinuses   and mastoid air cells are clear. Medical Decision Making   I am the first provider for this patient. I reviewed the vital signs, available nursing notes, past medical history, past surgical history, family history and social history. Vital Signs-Reviewed the patient's vital signs.   Patient Vitals for the past 12 hrs:   Temp Pulse Resp BP SpO2   02/25/18 1400 98.6 °F (37 °C) 78 11 114/70 100 %   02/25/18 1332 - 79 12 103/69 100 %   02/25/18 1231 - 81 - (!) 170/103 100 %   02/25/18 1023 - 82 16 (!) 191/106 100 %   02/25/18 0900 98.5 °F (36.9 °C) 100 14 (!) 168/104 99 %       Provider Notes (Medical Decision Making):     MDM: mood disorder, paranoia, substance abuse, lower suspicion for CNS infection. ED Course:   Initial assessment performed. The patients presenting problems have been discussed, and they are in agreement with the care plan formulated and outlined with them. I have encouraged them to ask questions as they arise throughout their visit.    3:03 PM  LAST LOOK:  Just prior to transfer, I re-evaluated the patient. Pt is resting, and is in no distress. Vitals reviewed. At this time, I feel that she is stable for transfer. Disposition:  PLAN FOR TRANSFER:  3:02 PM  The patient is being transferred to [facility] for psychiatric admission. The results of their tests and reasons for their transfer have been discussed with the patient and/or available family. The patient/family has conveyed agreement and understanding for the need to be admitted and for their admission diagnosis. Diagnosis     Clinical Impression:   1. Paranoia (psychosis) ([address]    2. Elevated blood pressure reading    3. Hypokalemia        Attestations: This note is prepared by Amando Tobias, acting as Scribe for Yarelis Adames MD.      The scribe's documentation has been prepared under my direction and personally reviewed by me in its entirety. I confirm that the note above accurately reflects all work, treatment, procedures, and medical decision making performed by me.   Yarelis Adames MD

## 2018-02-26 PROCEDURE — 74011250637 HC RX REV CODE- 250/637: Performed by: PSYCHIATRY & NEUROLOGY

## 2018-02-26 PROCEDURE — 65220000003 HC RM SEMIPRIVATE PSYCH

## 2018-02-26 RX ORDER — OMEPRAZOLE 20 MG/1
20 CAPSULE, DELAYED RELEASE ORAL DAILY
Status: DISCONTINUED | OUTPATIENT
Start: 2018-02-27 | End: 2018-02-26 | Stop reason: CLARIF

## 2018-02-26 RX ORDER — PANTOPRAZOLE SODIUM 40 MG/1
40 TABLET, DELAYED RELEASE ORAL
Status: DISCONTINUED | OUTPATIENT
Start: 2018-02-27 | End: 2018-03-06 | Stop reason: HOSPADM

## 2018-02-26 RX ORDER — LISINOPRIL 20 MG/1
20 TABLET ORAL DAILY
Status: DISCONTINUED | OUTPATIENT
Start: 2018-02-27 | End: 2018-03-06 | Stop reason: HOSPADM

## 2018-02-26 RX ORDER — RISPERIDONE 1 MG/1
1 TABLET, FILM COATED ORAL
Status: DISCONTINUED | OUTPATIENT
Start: 2018-02-26 | End: 2018-03-01

## 2018-02-26 RX ADMIN — LORAZEPAM 1 MG: 1 TABLET ORAL at 21:25

## 2018-02-26 RX ADMIN — OLANZAPINE 5 MG: 5 TABLET, FILM COATED ORAL at 21:25

## 2018-02-26 RX ADMIN — OLANZAPINE 5 MG: 5 TABLET, FILM COATED ORAL at 00:58

## 2018-02-26 RX ADMIN — ZOLPIDEM TARTRATE 5 MG: 5 TABLET ORAL at 00:58

## 2018-02-26 RX ADMIN — RISPERIDONE 1 MG: 1 TABLET ORAL at 21:08

## 2018-02-26 NOTE — PROGRESS NOTES
Problem: Psychosis  Goal: *STG: Remains safe in hospital  Outcome: Progressing Towards Goal  Patient is sitting quietly in her room. Greeted staff with a smile. No distress noted.   Patient remains safe in hospital.

## 2018-02-26 NOTE — PROGRESS NOTES
[facility]  Master Treatment Plan for Godwin Safe    Date Treatment Plan Initiated: 2/26/18    Treatment Plan Modalities:  Type of Modality Amount  (x minutes) Frequency (x/week) Duration (x days) Name of Responsible Staff   [address] [facility] meetings to encourage peer interactions 15 7 1 AC Burton     Group psychotherapy to assist in building coping skills and internal controls 60 7 1 Madison Linton LCSW   Therapeutic activity groups to build coping skills 60 7 1 Madison Linton LCSW   Psychoeducation in group setting to address:   Medication education   15 7 1 [place] BRAVO RN   Coping skills         Relaxation techniques         Symptom management         Discharge planning   60 2 [address] Mildred Dejesus   Spirituality    60 2 [facility]         Recovery/AA/NA         Physician medication management   15 7 1 Dr. Kamar Daniel   Family meeting/discharge planning                                                  Problem: Psychosis goal will be met by 2/26/18  Goal: *STG: Remains safe in hospital  Outcome: Progressing Towards Goal  Pt remains safe on the unit. Continued on Q 15 minute safety checks. Goal: *STG: Seeks staff when feelings of self harm or harm towards others arise  Outcome: Progressing Towards Goal  Seeks staff with concerns. Intrusive with staff and requires redirection. Goal: *STG/LTG: Complies with medication therapy  Outcome: Progressing Towards Goal  No medications scheduled at this time. Problem: *Psychosis: Discharge Outcome  Goal: *Absent or Controlled Active Psychotic Symptoms  Outcome: Progressing Towards Goal  Continues to be religiously preoccupied.      Problem: Anxiety-Behavioral Health (Adult/Pediatric) goal will be met by 2/26/18  Goal: *STG: Participates in treatment plan  Outcome: Progressing Towards Goal  Pt participated in treatment team.   Goal: *STG: Attends activities and groups  Outcome: Progressing Towards Goal  Attended the [place] [facility]. Encouraged pt to express feelings. Problem: Discharge Planning goal will be met by 2/26/18  Goal: *Discharge to safe environment  Outcome: Progressing Towards Goal  Social Working with discharge planning  Goal: *Knowledge of medication management  Outcome: Progressing Towards Goal  Will review medication management upon discharge. Goal: *Knowledge of discharge instructions  Outcome: Progressing Towards Goal  Will review upon discharge. Problem: Falls - Risk of goal will be met by 2/26/18  Goal: *Absence of Falls  Document Nik Subramanian Fall Risk and appropriate interventions in the flowsheet. Outcome: Progressing Towards Goal  Fall Risk Interventions:        Bed in low position and wearing slip resistant socks.

## 2018-02-26 NOTE — PROGRESS NOTES
PRN Medication Documentation    Specific patient behavior that led to need for PRN medication: Not responding to redirection. Refusing to go to her room or [facility]. Talking very loud about her directions from God. Waking other patients. Staff interventions attempted prior to PRN being given: Talking and redirection  PRN medication given: Zyprexa and Ambien  Patient response/effectiveness of PRN medication: taken to room by security.   Tech in doorway

## 2018-02-26 NOTE — PROGRESS NOTES
Problem: Falls - Risk of  Goal: *Absence of Falls  Document Denae Fall Risk and appropriate interventions in the flowsheet.   Outcome: Progressing Towards Goal  Fall Risk Interventions:    pt is fall free

## 2018-02-26 NOTE — BH NOTES
PSYCHOSOCIAL ASSESSMENT  :Patient identifying info:  Jamie Sanchez is a 65 y.o., female admitted 2/25/2018  3:56 PM     Presenting problem and precipitating factors: Patient was sent to [address] [place] [address] on a [facility] TDO due to delusional and paranoid thoughts. She was staying a [address] [place] [address] where she was taking the blankets off the other members of the shelter. When she was comforted she told them she was been followed by the [facility]. 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 She had room and board at this job, but when she saw blood in her clients heredia bag she become paranoid and left. \" the color red means danger / demons and evil \"    Mental status assessment:alert , oriented x's 3 , pleasant , very suspicious and guarded,   Insight and judgement are impaired     Current psychiatric providers and contact info: none     Previous psychiatric services/providers and response to treatment: reports she saw a counselor in a shelter in [place]     Family history of mental illness :unknown    Substance abuse history:    Social History   Substance Use Topics    Smoking status: Never Smoker    Smokeless tobacco: Not on file    Alcohol use No       Family constellation:  5 children all in [place]     Is significant other involved?        Describe support system:     Describe living arrangements and home environment:she was living in a [facility]   Health issues:   Hospital Problems  Never Reviewed          Codes Class Noted POA    Psychotic disorder ICD-10-CM: F29  ICD-9-CM: 298.9  2/25/2018 Unknown              Trauma history: yes, she was not forth coming about her trauma , but reports from her  and his family in [place] - she does not feel safe talking about the trauma     Legal issues: no    History of  service: no    Financial status: she was employed as a      Gnosticism/cultural factors: yes, she     Education/work history: unknown    Have you been licensed as a dhaval care professional (current or ): no  Leisure and recreation preferences:   Describe coping skills:jose e Suarez  2018

## 2018-02-26 NOTE — PROGRESS NOTES
Problem: Falls - Risk of  Goal: *Absence of Falls  Document Denae Fall Risk and appropriate interventions in the flowsheet.    Outcome: Progressing Towards Goal  Fall Risk Interventions:

## 2018-02-26 NOTE — BH NOTES
GROUP THERAPY PROGRESS NOTE    Michael Issa is participating in [place]. Group time: 15 minutes    Personal goal for participation:   To read the bible    Goal orientation: community    Group therapy participation: active    Therapeutic interventions reviewed and discussed: Review of unit guidelines and setting a daily goal.    Impression of participation: active

## 2018-02-26 NOTE — H&P
Hospitalist History and Physical  Primary Care Provider: Bianca Sanchez MD    Subjective:     Gerardo Howell is a 65 y.o. female  With h/o HTN and GERD who was admitted to in psych unit after being found at homeless shelter apparently taking away other residents' blankets as she felt they were demonized then witnessed to drink a bottle of vinegar. Pt currently denies any cp, palpitations, or dyspnea. Does take lisinopril for her htn. Denies any further complaints. Review of Systems:  Further 10 point review of systems is benign. A comprehensive review of systems was negative except for that written in the History of Present Illness. Past Medical History:   Diagnosis Date    Hypertension     Ill-defined condition     GERD      No past surgical history on file. Prior to Admission medications    Medication Sig Start Date End Date Taking? Authorizing Provider   lisinopril (PRINIVIL, ZESTRIL) 20 mg tablet Take 20 mg by mouth daily. Bianca Sanchez MD   omeprazole (PRILOSEC) 20 mg capsule Take 20 mg by mouth three (3) times daily. Bianca Sanchez MD     No Known Allergies   No family history on file. SOCIAL HISTORY:  Patient resides at Home. Patient ambulates with self. Smoking history: denies  Alcohol history: denies    Objective:       Physical Exam:   Visit Vitals    /89    Pulse 65    Temp 98.4 °F (36.9 °C)    Resp 16    SpO2 98%    Breastfeeding No     General appearance: alert, cooperative, no distress, appears stated age  Head: Normocephalic, without obvious abnormality, atraumatic  Lungs: clear to auscultation bilaterally  Heart: regular rate and rhythm, S1, S2 normal, no murmur, click, rub or gallop  Abdomen: soft, non-tender. Bowel sounds normal. No masses,  no organomegaly  Extremities: extremities normal, atraumatic, no cyanosis or edema  Neurologic: Grossly normal      Data Review: All diagnostic labs and studies have been reviewed.         Assessment:     Principal Problem:    Psychotic disorder (2/25/2018)        Plan:     HTN-resume her home lisinopril  Mild ANI- suspect prerenal sec dehydration, d/w pt to encourage to drink plenty of h20,also reitrated this to psych RN. Bmp in am  Mild Hypokalemia-already repelted. Being initiated on lisinopril as well. bmp in am  GERD-resume ppi, reportedly takes tid. Will order daily for now  Tobacco Abuse-agree with nicotine patch  Paranoia/Psychosis-mgmt as per psych    dvt p/x ambulatory. as per inpt psych protocol    Signed By: Stevan Castro MD     February 26, 2018

## 2018-02-26 NOTE — BH NOTES
GROUP THERAPY PROGRESS NOTE    Santa Espinal participated in the Acute Unit's afternoon Process Group, with a focus on identifying feelings, planning for the rest of the day, and preparing for discharge. Group time: 45 minutes. Personal goal for participation: To increase the capacity to improve ones mood and structure. Goal orientation: The patient will be able to identify their feelings, develop a plan for structuring their day, and discharge planning. Group therapy participation: With prompting, this patient partially participated in the group. Therapeutic interventions reviewed and discussed: The group members were asked to introduce themselves to each other and to see if they could identify an emotion they are having and/or let the group know what they want to focus on for the day as they continue to make discharge plans. Impression of participation: The patient joined the session about ten minutes after it started and she was called out of group by nursing for a few minutes. When prompted, she said she was feeling \"good. \" She also indicated that she was in the hospital because she was \"afraid the KKK are after me. \" She added that she intended to read \"the Bible\" as a way to relax during the rest of the day. She expressed no SI/HI. She may have been expressing a paranoid delusion involving her fears of the [place] and her Sabianist preoccupation. Her affect was anxious and her mood reflected her affect. She was alert and cooperative and, once present, seemed to enjoy the group and she expressed a desire to speak more in follow up groups. This was the patient's first process group with the undersigned.

## 2018-02-26 NOTE — H&P
INITIAL PSYCHIATRIC EVALUATION            IDENTIFICATION:    Patient Name  Chetna Koehler   Date of Birth 1955   [facility] [id number]   Medical Record Number  [id number]      Age  65 y.o. PCP Bianca Sanchez, MD   Admit date:  2/25/2018    Room Number  734/01  @ [address]   Date of Service  2/26/2018            HISTORY         REASON FOR HOSPITALIZATION:  CC: fear of [place] and the [place]. Pt admitted under a temporary USP order (TDO) severe psychosis  proving to be an imminent danger to self and others and an inability to care for self. HISTORY OF PRESENT ILLNESS:    The patient, Chetna Koehler, is a 65 y.o.  OTHER female with unknown psychiatric history, who presents at this time with complaints of (and/or evidence of) the following emotional symptoms: psychotic behavior. Additional symptomatology include bizarre and impulsive behavior. The patient moved to the [address] [place] [address] from [facility], two years ago based on message from [employer]. She came with a [employer] and she describes 'fleeing' from her  and his family. 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 She left [facility] due to fear of the [place] trying to kill her. She has been staying at the [facility] in [place] because she was looking for a safe place to stay, pray to God. The staff called 911 due to her paranoia, stealing residents blankets, poor sleep, etc. Since admission, she has expressed fear of color Red which she associates with the Devil. The above symptoms have been present for several weeks, possibly chronic. These symptoms are of moderate to high severity. These symptoms are constant in nature. ALLERGIES: No Known Allergies   MEDICATIONS PRIOR TO ADMISSION:   Prescriptions Prior to Admission   Medication Sig    lisinopril (PRINIVIL, ZESTRIL) 20 mg tablet Take 20 mg by mouth daily.     omeprazole (PRILOSEC) 20 mg capsule Take 20 mg by mouth three (3) times daily. PAST MEDICAL HISTORY:   Past Medical History:   Diagnosis Date    Hypertension     Ill-defined condition     GERD   No past surgical history on file. SOCIAL HISTORY: Originally from [facility] where her  and children remain. No close family in [place] or [place]. Social History     Social History    Marital status: LEGALLY      Spouse name: N/A    Number of children: N/A    Years of education: N/A     Occupational History    Not on file. Social History Main Topics    Smoking status: Never Smoker    Smokeless tobacco: Not on file    Alcohol use No    Drug use: Not on file    Sexual activity: Not on file     Other Topics Concern    Not on file     Social History Narrative    No narrative on file      FAMILY HISTORY: History reviewed. No pertinent family history. No family history on file. REVIEW OF SYSTEMS:   Gen: denies pain  Resp: denies sob  Cardiac: denies cp  Extr: ambulates w/o difficulty  Pertinent items are noted in the History of Present Illness. All other Systems reviewed and are considered negative. MENTAL STATUS EXAM & VITALS     MENTAL STATUS EXAM (MSE):    MSE FINDINGS ARE WITHIN NORMAL LIMITS (WNL) UNLESS OTHERWISE STATED BELOW. ( ALL OF THE BELOW CATEGORIES OF THE MSE HAVE BEEN REVIEWED (reviewed 2/26/2018) AND UPDATED AS DEEMED APPROPRIATE )  General Presentation age appropriate and casually dressed, cooperative   Orientation oriented to time, place and person   Vital Signs  See below (reviewed 2/26/2018); Vital Signs (BP, Pulse, & Temp) are within normal limits if not listed below.    Gait and Station Stable/steady, no ataxia   Musculoskeletal System No extrapyramidal symptoms (EPS); no abnormal muscular movements or Tardive Dyskinesia (TD); muscle strength and tone are within normal limits   Language No aphasia or dysarthria   Speech:  normal pitch and normal volume   Thought Processes Linear thoughts although not all logical   Thought Associations goal directed   Thought Content paranoid delusions, bizarre delusions, Christian delusions and internally preoccupied   Suicidal Ideations none   Homicidal Ideations none   Mood:  labile    Affect:  mood-congruent   Memory recent  good   Memory remote:  intact   Concentration/Attention:  wnl   Fund of Knowledge wnl   Insight:  fair   Reliability fair   Judgment:  fair          VITALS:     Patient Vitals for the past 24 hrs:   Temp Pulse Resp BP SpO2   02/26/18 1240 98.6 °F (37 °C) 67 16 (!) 162/94 -   02/26/18 0809 98.1 °F (36.7 °C) 65 16 143/85 97 %     Wt Readings from Last 3 Encounters:   02/25/18 77.1 kg (170 lb)     Temp Readings from Last 3 Encounters:   02/26/18 98.6 °F (37 °C)   02/25/18 98.6 °F (37 °C)     BP Readings from Last 3 Encounters:   02/26/18 (!) 162/94   02/25/18 114/70     Pulse Readings from Last 3 Encounters:   02/26/18 67   02/25/18 78            DATA     LABORATORY DATA:  Labs Reviewed - No data to display  Admission on 02/25/2018, Discharged on 02/25/2018   Component Date Value Ref Range Status    WBC 02/25/2018 4.4  3.6 - 11.0 K/uL Final    RBC 02/25/2018 3.98  3.80 - 5.20 M/uL Final    HGB 02/25/2018 13.0  11.5 - 16.0 g/dL Final    HCT 02/25/2018 38.8  35.0 - 47.0 % Final    MCV 02/25/2018 97.5  80.0 - 99.0 FL Final    MCH 02/25/2018 32.7  26.0 - 34.0 PG Final    MCHC 02/25/2018 33.5  30.0 - 36.5 g/dL Final    RDW 02/25/2018 12.9  11.5 - 14.5 % Final    PLATELET 59/07/8887 662  150 - 400 K/uL Final    MPV 02/25/2018 10.6  8.9 - 12.9 FL Final    NRBC 02/25/2018 0.0  0  WBC Final    ABSOLUTE NRBC 02/25/2018 0.00  0.00 - 0.01 K/uL Final    NEUTROPHILS 02/25/2018 72  32 - 75 % Final    LYMPHOCYTES 02/25/2018 22  12 - 49 % Final    MONOCYTES 02/25/2018 6  5 - 13 % Final    EOSINOPHILS 02/25/2018 0  0 - 7 % Final    BASOPHILS 02/25/2018 1  0 - 1 % Final    IMMATURE GRANULOCYTES 02/25/2018 0  0.0 - 0.5 % Final    ABS. NEUTROPHILS 02/25/2018 3.1  1.8 - 8.0 K/UL Final    ABS. LYMPHOCYTES 02/25/2018 1.0  0.8 - 3.5 K/UL Final    ABS. MONOCYTES 02/25/2018 0.3  0.0 - 1.0 K/UL Final    ABS. EOSINOPHILS 02/25/2018 0.0  0.0 - 0.4 K/UL Final    ABS. BASOPHILS 02/25/2018 0.0  0.0 - 0.1 K/UL Final    ABS. IMM. GRANS. 02/25/2018 0.0  0.00 - 0.04 K/UL Final    DF 02/25/2018 AUTOMATED    Final    Sodium 02/25/2018 142  136 - 145 mmol/L Final    Potassium 02/25/2018 3.4* 3.5 - 5.1 mmol/L Final    Chloride 02/25/2018 107  97 - 108 mmol/L Final    CO2 02/25/2018 27  21 - 32 mmol/L Final    Anion gap 02/25/2018 8  5 - 15 mmol/L Final    Glucose 02/25/2018 91  65 - 100 mg/dL Final    BUN 02/25/2018 12  6 - 20 MG/DL Final    Creatinine 02/25/2018 1.14* 0.55 - 1.02 MG/DL Final    BUN/Creatinine ratio 02/25/2018 11* 12 - 20   Final    GFR est AA 02/25/2018 59* >60 ml/min/1.73m2 Final    GFR est non-AA 02/25/2018 48* >60 ml/min/1.73m2 Final    Calcium 02/25/2018 8.9  8.5 - 10.1 MG/DL Final    Bilirubin, total 02/25/2018 0.6  0.2 - 1.0 MG/DL Final    ALT (SGPT) 02/25/2018 30  12 - 78 U/L Final    AST (SGOT) 02/25/2018 22  15 - 37 U/L Final    Alk.  phosphatase 02/25/2018 40* 45 - 117 U/L Final    Protein, total 02/25/2018 7.9  6.4 - 8.2 g/dL Final    Albumin 02/25/2018 3.8  3.5 - 5.0 g/dL Final    Globulin 02/25/2018 4.1* 2.0 - 4.0 g/dL Final    A-G Ratio 02/25/2018 0.9* 1.1 - 2.2   Final    ALCOHOL(ETHYL),SERUM 02/25/2018 <10  <10 MG/DL Final    AMPHETAMINES 02/25/2018 NEGATIVE   NEG   Final    BARBITURATES 02/25/2018 NEGATIVE   NEG   Final    BENZODIAZEPINES 02/25/2018 NEGATIVE   NEG   Final    COCAINE 02/25/2018 NEGATIVE   NEG   Final    METHADONE 02/25/2018 NEGATIVE   NEG   Final    OPIATES 02/25/2018 NEGATIVE   NEG   Final    PCP(PHENCYCLIDINE) 02/25/2018 NEGATIVE   NEG   Final    THC (TH-CANNABINOL) 02/25/2018 NEGATIVE   NEG   Final    Drug screen comment 02/25/2018 (NOTE)   Final    Salicylate level 85/61/5689 <1.7* 2.8 - 20.0 MG/DL Final    Acetaminophen level 02/25/2018 <2* 10 - 30 ug/mL Final    Color 02/25/2018 YELLOW/STRAW    Final    Appearance 02/25/2018 CLEAR  CLEAR   Final    Specific gravity 02/25/2018 1.014  1.003 - 1.030   Final    pH (UA) 02/25/2018 5.5  5.0 - 8.0   Final    Protein 02/25/2018 TRACE* NEG mg/dL Final    Glucose 02/25/2018 NEGATIVE   NEG mg/dL Final    Ketone 02/25/2018 TRACE* NEG mg/dL Final    Bilirubin 02/25/2018 NEGATIVE   NEG   Final    Blood 02/25/2018 NEGATIVE   NEG   Final    Urobilinogen 02/25/2018 0.2  0.2 - 1.0 EU/dL Final    Nitrites 02/25/2018 NEGATIVE   NEG   Final    Leukocyte Esterase 02/25/2018 TRACE* NEG   Final    WBC 02/25/2018 0-4  0 - 4 /hpf Final    RBC 02/25/2018 0-5  0 - 5 /hpf Final    Epithelial cells 02/25/2018 FEW  FEW /lpf Final    Bacteria 02/25/2018 NEGATIVE   NEG /hpf Final    UA:UC IF INDICATED 02/25/2018 CULTURE NOT INDICATED BY UA RESULT  CNI   Final    Hyaline cast 02/25/2018 0-2  0 - 5 /lpf Final    TSH 02/25/2018 1.40  0.36 - 3.74 uIU/mL Final        RADIOLOGY REPORTS:  No results found for this or any previous visit. Ct Head Wo Cont    Result Date: 2/25/2018  EXAM:  CT HEAD WO CONT INDICATION:   Confusion/delirium, altered LOC, unexplained COMPARISON: None. CONTRAST:  None. TECHNIQUE: Unenhanced CT of the head was performed using 5 mm images. Brain and bone windows were generated. CT dose reduction was achieved through use of a standardized protocol tailored for this examination and automatic exposure control for dose modulation. FINDINGS: The ventricles and sulci are normal in size, shape and configuration and midline. There is periventricular hypoattenuation. There is no intracranial hemorrhage, extra-axial collection, mass, mass effect or midline shift. The basilar cisterns are open. No acute infarct is identified. The bone windows demonstrate no abnormalities.  The visualized portions of the paranasal sinuses and mastoid air cells are clear. IMPRESSION: No acute findings. MEDICATIONS       ALL MEDICATIONS  Current Facility-Administered Medications   Medication Dose Route Frequency    risperiDONE (RisperDAL) tablet 1 mg  1 mg Oral QHS    ziprasidone (GEODON) 20 mg in sterile water (preservative free) 1 mL injection  20 mg IntraMUSCular BID PRN    OLANZapine (ZyPREXA) tablet 5 mg  5 mg Oral Q6H PRN    benztropine (COGENTIN) tablet 2 mg  2 mg Oral BID PRN    benztropine (COGENTIN) injection 2 mg  2 mg IntraMUSCular BID PRN    LORazepam (ATIVAN) injection 2 mg  2 mg IntraMUSCular Q4H PRN    LORazepam (ATIVAN) tablet 1 mg  1 mg Oral Q4H PRN    zolpidem (AMBIEN) tablet 5 mg  5 mg Oral QHS PRN    acetaminophen (TYLENOL) tablet 650 mg  650 mg Oral Q4H PRN    ibuprofen (MOTRIN) tablet 400 mg  400 mg Oral Q8H PRN    magnesium hydroxide (MILK OF MAGNESIA) 400 mg/5 mL oral suspension 30 mL  30 mL Oral DAILY PRN    nicotine (NICODERM CQ) 21 mg/24 hr patch 1 Patch  1 Patch TransDERmal DAILY PRN      SCHEDULED MEDICATIONS  Current Facility-Administered Medications   Medication Dose Route Frequency    risperiDONE (RisperDAL) tablet 1 mg  1 mg Oral QHS                ASSESSMENT & PLAN        The patient, Go Musa, is a 65 y.o.  female who presents at this time for treatment of the following diagnoses:  Patient Active Hospital Problem List:     Psychotic disorder (2/25/2018)    Assessment: Rule out medical causes vs. Bipolar bert vs. Schizophrenia vs. Delusional disorder    Plan: Agree with inpatient admission for further stabilization, safety monitoring and medication management  Medications- start Risperdal 1mg at night  Need collateral information  Medically- check tsh, rpr, MRI,                A coordinated, multidisplinary treatment team (includes the nurse, unit pharmcist,  and writer) round was conducted for this initial evaluation with the patient present.      The following regarding medications was addressed during rounds with patient:   the risks and benefits of the proposed medication. The patient was given the opportunity to ask questions. Informed consent given to the use of the above medications. I will continue to adjust psychiatric and non-psychiatric medications (see above \"medication\" section and orders section for details) as deemed appropriate & based upon diagnoses and response to treatment. I have reviewed admission (and previous/old) labs and medical tests in the EHR and or transferring hospital documents. I will continue to order blood tests/labs and diagnostic tests as deemed appropriate and review results as they become available (see orders for details). I have reviewed old psychiatric and medical records available in the EHR. I Will order additional psychiatric records from other institutions to further elucidate the nature of patient's psychopathology and review once available. I will gather additional collateral information from friends, family and o/p treatment team to further elucidate the nature of patient's psychopathology and baselline level of psychiatric functioning.       ESTIMATED LENGTH OF STAY:    3-5days       STRENGTHS:  Exercising self-direction/Resourceful and Motivated and ready for change                                        SIGNED:    Dolly Lester MD  2/26/2018

## 2018-02-26 NOTE — BSMART NOTE
0000- Pt is standing in hallway in front of peer's closed door reading her bible. Pt is redirected away from the peer's door and prompted to go to her room to rest. Pt affect appears to be flat and she appears to be responding to internal stimuli. Pt refuses stating, \"This is part of my plan. \" Pt then proceeds to walk to end of hallway and knock on the doors leading to geriatric unit. Pt is again redirected to go to her room and rest. Pt again refuses instead coming back toward dining room and standing in font of a chair reading her book. Charge nurse notified.

## 2018-02-26 NOTE — BH NOTES
Patient refusing to go to her room. She is loud and already woke one patient. Patient given Zyprexa and Ambien and security assisted her to her room at [place] [address]. Tech seated outside her room until medication works.

## 2018-02-26 NOTE — BH NOTES
Behavioral Health Interdisciplinary Rounds     Patient Name: Gurinder Azevedo  Age: 65 y.o. Room/Bed:  734/02  Primary Diagnosis: <principal problem not specified>   Admission Status: Involuntary Commitment     Readmission within 30 days: no  Power of  in place: no  Patient requires a blocked bed: yes          Reason for blocked bed: disruptive behavior    VTE Prophylaxis: Not indicated  Flu vaccine given : no   Mobility needs/Fall risk: no    Nutritional Plan: no  Consults: n/a         Labs/Testing due today?: no    Sleep hours: 4.0       Participation in Care/Groups:  no  Medication Compliant?: Yes  PRNS (last 24 hours): None    Restraints (last 24 hours):  no  Substance Abuse:  no  CIWA (range last 24 hours): 0 COWS (range last 24 hours): 0  Alcohol screening (AUDIT) completed -  AUDIT Score: 1  If applicable, date SBIRT discussed in treatment team AND documented: n/a  Tobacco - patient is a smoker: no   Date tobacco education completed by RN: n/a  24 hour chart check complete: yes     Patient goal(s) for today:   Treatment team focus/goals: Plan to assess for medication and discharge needs. Progress note - she was paranoid and guarded.       LOS:  1  Expected LOS: TBD    Financial concerns/prescription coverage:    Date of last family contact:       Family requesting physician contact today:    Discharge plan: shelter   Guns in the home:  no       Outpatient provider(s): TBD     Participating treatment team members: Virginia Bailey Dr.  Mariam Mathur

## 2018-02-27 LAB
ANION GAP SERPL CALC-SCNC: 7 MMOL/L (ref 5–15)
BUN SERPL-MCNC: 10 MG/DL (ref 6–20)
BUN/CREAT SERPL: 10 (ref 12–20)
CALCIUM SERPL-MCNC: 8.6 MG/DL (ref 8.5–10.1)
CHLORIDE SERPL-SCNC: 108 MMOL/L (ref 97–108)
CHOLEST SERPL-MCNC: 245 MG/DL
CO2 SERPL-SCNC: 24 MMOL/L (ref 21–32)
CREAT SERPL-MCNC: 1.03 MG/DL (ref 0.55–1.02)
EST. AVERAGE GLUCOSE BLD GHB EST-MCNC: NORMAL MG/DL
GLUCOSE P FAST SERPL-MCNC: 89 MG/DL (ref 65–100)
GLUCOSE SERPL-MCNC: 86 MG/DL (ref 65–100)
HBA1C MFR BLD: 4.9 % (ref 4.2–6.3)
HDLC SERPL-MCNC: 69 MG/DL
HDLC SERPL: 3.6 {RATIO} (ref 0–5)
LDLC SERPL CALC-MCNC: 157.4 MG/DL (ref 0–100)
LIPID PROFILE,FLP: ABNORMAL
POTASSIUM SERPL-SCNC: 4 MMOL/L (ref 3.5–5.1)
SODIUM SERPL-SCNC: 139 MMOL/L (ref 136–145)
TRIGL SERPL-MCNC: 93 MG/DL (ref ?–150)
VLDLC SERPL CALC-MCNC: 18.6 MG/DL

## 2018-02-27 PROCEDURE — 74011250637 HC RX REV CODE- 250/637: Performed by: PSYCHIATRY & NEUROLOGY

## 2018-02-27 PROCEDURE — 36415 COLL VENOUS BLD VENIPUNCTURE: CPT | Performed by: PSYCHIATRY & NEUROLOGY

## 2018-02-27 PROCEDURE — 74011250637 HC RX REV CODE- 250/637: Performed by: HOSPITALIST

## 2018-02-27 PROCEDURE — 65220000003 HC RM SEMIPRIVATE PSYCH

## 2018-02-27 PROCEDURE — 80048 BASIC METABOLIC PNL TOTAL CA: CPT | Performed by: HOSPITALIST

## 2018-02-27 PROCEDURE — 80061 LIPID PANEL: CPT | Performed by: PSYCHIATRY & NEUROLOGY

## 2018-02-27 PROCEDURE — 83036 HEMOGLOBIN GLYCOSYLATED A1C: CPT | Performed by: PSYCHIATRY & NEUROLOGY

## 2018-02-27 PROCEDURE — 82947 ASSAY GLUCOSE BLOOD QUANT: CPT | Performed by: PSYCHIATRY & NEUROLOGY

## 2018-02-27 RX ADMIN — OLANZAPINE 5 MG: 5 TABLET, FILM COATED ORAL at 17:38

## 2018-02-27 RX ADMIN — RISPERIDONE 1 MG: 1 TABLET ORAL at 22:05

## 2018-02-27 RX ADMIN — PANTOPRAZOLE SODIUM 40 MG: 40 TABLET, DELAYED RELEASE ORAL at 06:41

## 2018-02-27 RX ADMIN — LORAZEPAM 1 MG: 1 TABLET ORAL at 17:38

## 2018-02-27 RX ADMIN — LISINOPRIL 20 MG: 20 TABLET ORAL at 08:55
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
## 2018-02-27 NOTE — INTERDISCIPLINARY ROUNDS
Behavioral Health Interdisciplinary Rounds     Patient Name: Mal Johnson  Age: 65 y.o. Room/Bed:  734/  Primary Diagnosis: Psychotic disorder   Admission Status: Involuntary Commitment     Readmission within 30 days: no  Power of  in place: no  Patient requires a blocked bed: yes          Reason for blocked bed: intrusive, loud, religiously preoccupied    VTE Prophylaxis: No  Flu vaccine given : no   Mobility needs/Fall risk: no    Nutritional Plan: no  Consults:          Labs/Testing due today?: yes    Sleep hours:  7.0      Participation in Care/Groups:  no  Medication Compliant?: Yes  PRNS (last 24 hours): antipsychotic and antianxiety    Restraints (last 24 hours):  no  Substance Abuse:  no  CIWA (range last 24 hours):  COWS (range last 24 hours):   Alcohol screening (AUDIT) completed -  AUDIT Score: 1  If applicable, date SBIRT discussed in treatment team AND documented:   Tobacco - patient is a smoker: no   Date tobacco education completed by RN: 02/26/18  24 hour chart check complete: yes     Patient goal(s) for today:   Treatment team focus/goals: Plan to titrate her medications . Progress note She remains paranoid and guarded. LOS:  2  Expected LOS: TBD     Financial concerns/prescription coverage:  no  Date of last family contact:       Family requesting physician contact today:  no  Discharge plan: she wants to return to the shelter   Guns in the home:  no       Outpatient provider(s): TBD     Participating treatment team members: Cheryl Frederick Dr., PharmD.

## 2018-02-27 NOTE — PROGRESS NOTES
Problem: Psychosis  Goal: *STG: Remains safe in hospital  Outcome: Progressing Towards Goal  Pt denies any suicidal or homicidal thoughts. Contracts for safety. Remains on q 15 min safety checks. Pt continues to be guarded and paranoid. Pt is quiet with limited interaction with staff and peers. Pt's wearing her   Prayer yuan as a head wrap. Pt has for Worship needs. Remains religiously preoccupied. Problem: Falls - Risk of  Goal: *Absence of Falls  Document Denae Fall Risk and appropriate interventions in the flowsheet. Outcome: Progressing Towards Goal  Fall Risk Interventions:                               1059- Pt was more interactive during treatment team. Pt was preoccupied with volunteering her services. Pt voiced,\"I just do what God wants me to do\"  Unable to focus on discussions about where she will return after discharge. Pt sat a goal to shower today.

## 2018-02-27 NOTE — PROGRESS NOTES
Laboratory Monitoring for Antipsychotics: This patient is currently prescribed the following medication(s):   Current Facility-Administered Medications   Medication Dose Route Frequency    risperiDONE (RisperDAL) tablet 1 mg  1 mg Oral QHS    lisinopril (PRINIVIL, ZESTRIL) tablet 20 mg  20 mg Oral DAILY    pantoprazole (PROTONIX) tablet 40 mg  40 mg Oral ACB     The following labs have been completed for monitoring of antipsychotics and/or mood stabilizers:    Height, Weight, BMI Estimation  Estimated body mass index is 27.44 kg/(m^2) as calculated from the following:    Height as of an earlier encounter on 2/25/18: 167.6 cm (66\"). Weight as of an earlier encounter on 2/25/18: 77.1 kg (170 lb). Vital Signs/Blood Pressure  Visit Vitals    /76    Pulse 84    Temp 98.2 °F (36.8 °C)    Resp 16    SpO2 100%    Breastfeeding No     Renal Function, Hepatic Function and Chemistry  CrCl cannot be calculated (Unknown ideal weight.). Lab Results   Component Value Date/Time    Sodium 139 02/27/2018 06:07 AM    Potassium 4.0 02/27/2018 06:07 AM    Chloride 108 02/27/2018 06:07 AM    CO2 24 02/27/2018 06:07 AM    Anion gap 7 02/27/2018 06:07 AM    BUN 10 02/27/2018 06:07 AM    Creatinine 1.03 (H) 02/27/2018 06:07 AM    BUN/Creatinine ratio 10 (L) 02/27/2018 06:07 AM    Bilirubin, total 0.6 02/25/2018 09:22 AM    Protein, total 7.9 02/25/2018 09:22 AM    Albumin 3.8 02/25/2018 09:22 AM    Globulin 4.1 (H) 02/25/2018 09:22 AM    A-G Ratio 0.9 (L) 02/25/2018 09:22 AM    ALT (SGPT) 30 02/25/2018 09:22 AM    Alk.  phosphatase 40 (L) 02/25/2018 09:22 AM     Lab Results   Component Value Date/Time    Glucose 89 02/27/2018 06:07 AM    Glucose 86 02/27/2018 06:07 AM     Lab Results   Component Value Date/Time    Hemoglobin A1c 4.9 02/27/2018 06:07 AM     Hematology  Lab Results   Component Value Date/Time    WBC 4.4 02/25/2018 09:22 AM    RBC 3.98 02/25/2018 09:22 AM    HGB 13.0 02/25/2018 09:22 AM    HCT 38.8 02/25/2018 09:22 AM    MCV 97.5 02/25/2018 09:22 AM    MCH 32.7 02/25/2018 09:22 AM    MCHC 33.5 02/25/2018 09:22 AM    RDW 12.9 02/25/2018 09:22 AM    PLATELET 661 50/14/3772 09:22 AM     Lipids  Lab Results   Component Value Date/Time    Cholesterol, total 245 (H) 02/27/2018 06:07 AM    HDL Cholesterol 69 02/27/2018 06:07 AM    LDL, calculated 157.4 (H) 02/27/2018 06:07 AM    Triglyceride 93 02/27/2018 06:07 AM    CHOL/HDL Ratio 3.6 02/27/2018 06:07 AM     Thyroid Function  Lab Results   Component Value Date/Time    TSH 1.40 02/25/2018 09:22 AM     Assessment/Plan:  Recommended baseline laboratory monitoring has been completed based on this patient's current medication regimen.      Derenda Krabbe, PharmD, BCPP, [facility], [address] [place] [address]

## 2018-02-27 NOTE — BH NOTES
GROUP THERAPY PROGRESS NOTE    The patient Amanda uZrita is participating in [employer]. Group time: 30 minutes    Personal goal for participation: to orient the patient to the unit.     Goal orientation: successful adoption of unit rules    Group therapy participation: active    Therapeutic interventions reviewed and discussed: Yes    Impression of participation:     Monalisa Flores  2/27/2018 9:54 AM

## 2018-02-27 NOTE — BH NOTES
PRN Medication Documentation    Specific patient behavior that led to need for PRN medication: Patient is walking fast in the hallway and singing out loud.   Staff interventions attempted prior to PRN being given: Used redirections, offered PRN  PRN medication given: Zyprexa 5 mg PO and Ativan 1 mg PO  Patient response/effectiveness of PRN medication:

## 2018-02-27 NOTE — INTERDISCIPLINARY ROUNDS
Behavioral Health Interdisciplinary Rounds     Patient Name: Gilbert Schaefer  Age: 65 y.o. Room/Bed:  [facility]/  Primary Diagnosis: Psychotic disorder   Admission Status: Involuntary Commitment     Readmission within 30 days: no  Power of  in place: no  Patient requires a blocked bed: yes          Reason for blocked bed: intrusive,loud, religiously preoccupied    VTE Prophylaxis: Not indicated  Flu vaccine given : no   Mobility needs/Fall risk: no    Nutritional Plan: no  Consults:          Labs/Testing due today?: no    Sleep hours:        Participation in Care/Groups:  no  Medication Compliant?: Yes  PRNS (last 24 hours):  Antipsychotic (PO) and Antianxiety    Restraints (last 24 hours):  no  Substance Abuse:  no  CIWA (range last 24 hours):  COWS (range last 24 hours):   Alcohol screening (AUDIT) completed -  AUDIT Score: 1  If applicable, date SBIRT discussed in treatment team AND documented:   Tobacco - patient is a smoker: no   Date tobacco education completed by RN:   24 hour chart check complete: yes     Patient goal(s) for today:   Treatment team focus/goals:   Progress note     LOS:  2  Expected LOS:     Financial concerns/prescription coverage:    Date of last family contact:      Family requesting physician contact today:    Discharge plan:   Guns in the home:         Outpatient provider(s):     Participating treatment team members: Gilbert Schaefer, * (assigned SW),

## 2018-02-27 NOTE — PROGRESS NOTES
Problem: Falls - Risk of  Goal: *Absence of Falls  Document Denae Fall Risk and appropriate interventions in the flowsheet. Outcome: Progressing Towards Goal  Fall Risk Interventions:  Patient has been fall free since arriving on the Acute Unit. She is currently sleeping, no stress noted.

## 2018-02-27 NOTE — BH NOTES
During rounds patient was noted to be in bed asleep with respirations even and unlabored as chest was rising and falling. Will continue to monitor throughout shift for safety.

## 2018-02-28 PROCEDURE — 74011250637 HC RX REV CODE- 250/637: Performed by: PSYCHIATRY & NEUROLOGY

## 2018-02-28 PROCEDURE — 74011250637 HC RX REV CODE- 250/637: Performed by: HOSPITALIST

## 2018-02-28 PROCEDURE — 65220000003 HC RM SEMIPRIVATE PSYCH

## 2018-02-28 RX ADMIN — RISPERIDONE 1 MG: 1 TABLET ORAL at 21:24

## 2018-02-28 RX ADMIN — LISINOPRIL 20 MG: 20 TABLET ORAL at 10:31

## 2018-02-28 RX ADMIN — PANTOPRAZOLE SODIUM 40 MG: 40 TABLET, DELAYED RELEASE ORAL at 06:40

## 2018-02-28 NOTE — PROGRESS NOTES
Problem: Anxiety-Behavioral Health (Adult/Pediatric)  Goal: *STG: Participates in treatment plan  Outcome: Progressing Towards Goal  Patient threw away clothes in trash. Reading bible out loud with her roommate. Mood is pleasant.

## 2018-02-28 NOTE — PROGRESS NOTES
Participated in [facility] [address] group. Topic for discussion was acceptance of imperfection. [facility] Staff  (Govind Mccray Patient Care Specialist)   Paging Service [phone])

## 2018-02-28 NOTE — BH NOTES
GROUP THERAPY PROGRESS NOTE    The patient Tessy Johnson is participating in [employer]. Group time: 30 minutes    Personal goal for participation: to orient the patient to the unit.     Goal orientation: successful adoption of unit rules    Group therapy participation: active    Therapeutic interventions reviewed and discussed: Yes    Impression of participation:     [facility]  2/28/2018 9:43 AM

## 2018-02-28 NOTE — BH NOTES
PSYCHIATRIC PROGRESS NOTE         Patient Name  Kell Robin   Date of Birth 1955   [facility] [id number]   Medical Record Number  [id number]      Age  65 y.o. PCP Bianca Sanchez, MD   Admit date:  2/25/2018    Room Number  734/01  @ [facility]   Date of Service  2/27/2018          PSYCHOTHERAPY SESSION NOTE:  Length of psychotherapy session: 20 minutes    Main condition/diagnosis/issues treated during session today, 2/27/2018 : psychosis    I employed Cognitive Behavioral therapy techniques, Reality-Oriented psychotherapy, as well as supportive psychotherapy in regards to various ongoing psychosocial stressors, including the following: pre-admission and current problems; housing issues; occupational issues; academic issues; legal issues; medical issues; and stress of hospitalization. Interpersonal relationship issues and psychodynamic conflicts explored. Attempts made to alleviate maladaptive patterns. Overall, patient is not progressing    Treatment Plan Update (reviewed an updated 2/27/2018) : I will modify psychotherapy tx plan by implementing more stress management strategies, building upon cognitive behavioral techniques, increasing coping skills, as well as shoring up psychological defenses). An extended energy and skill set was needed to engage pt in psychotherapy due to some of the following: resistiveness, complexity, negativity, confrontational nature, hostile behaviors, and/or severe abnormalities in thought processes/psychosis resulting in the loss of expressive/receptive language communication skills. E & M PROGRESS NOTE:         HISTORY       CC:  \"I'm fine\"  HISTORY OF PRESENT ILLNESS/INTERVAL HISTORY:  (reviewed/updated 2/27/2018).   per initial evaluation:   The patient, Kell Robin, is a 65 y.o.  OTHER female with unknown psychiatric history, who presents at this time with complaints of (and/or evidence of) the following emotional symptoms: psychotic behavior. Additional symptomatology include bizarre and impulsive behavior. The patient moved to the [address] [place] [address],[address] from [facility] in [place], two years ago based on message from Zach 1827. She came with a [employer] and she describes 'fleeing' from her  and his family. 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 She left [facility] due to fear of the [place] trying to kill her. She has been staying at the [facility] in [place] because she was looking for a safe place to stay, pray to God. The staff called 911 due to her paranoia, stealing residents blankets, poor sleep, etc. Since admission, she has expressed fear of color Red which she associates with the Devil. The above symptoms have been present for several weeks, possibly chronic. These symptoms are of moderate to high severity. These symptoms are constant in nature. Rosa Mcginnis presents/reports/evidences the following emotional symptoms today, 2/27/2018:delusions and psychotic behavior. The above symptoms have been present for several weeks. These symptoms are of moderate to high severity. The symptoms are constant in nature. Additional symptomatology and features include some agitation last night, pacing halls, not redirectable. Received IM prns. SIDE EFFECTS: (reviewed/updated 2/27/2018)  None reported or admitted to. ALLERGIES:(reviewed/updated 2/27/2018)  No Known Allergies   MEDICATIONS PRIOR TO ADMISSION:(reviewed/updated 2/27/2018)  Prescriptions Prior to Admission   Medication Sig    lisinopril (PRINIVIL, ZESTRIL) 20 mg tablet Take 20 mg by mouth daily.  omeprazole (PRILOSEC) 20 mg capsule Take 20 mg by mouth three (3) times daily.       PAST MEDICAL HISTORY: Past medical history from the initial psychiatric evaluation has been reviewed (reviewed/updated 2/27/2018) with no additional updates (I asked patient and no additional past medical history provided). Past Medical History:   Diagnosis Date    Hypertension     Ill-defined condition     GERD   No past surgical history on file. SOCIAL HISTORY: Social history from the initial psychiatric evaluation has been reviewed (reviewed/updated 2/27/2018) with no additional updates (I asked patient and no additional social history provided). Social History     Social History    Marital status: LEGALLY      Spouse name: N/A    Number of children: N/A    Years of education: N/A     Occupational History    Not on file. Social History Main Topics    Smoking status: Never Smoker    Smokeless tobacco: Not on file    Alcohol use No    Drug use: Not on file    Sexual activity: Not on file     Other Topics Concern    Not on file     Social History Narrative    No narrative on file      FAMILY HISTORY: Family history from the initial psychiatric evaluation has been reviewed (reviewed/updated 2/27/2018) with no additional updates (I asked patient and no additional family history provided). No family history on file. REVIEW OF SYSTEMS: (reviewed/updated 2/27/2018)  Appetite:good   Sleep: good   All other Review of Systems: psychosis         [facility] (List of hospitals in the [place]):    List of hospitals in the [place] FINDINGS ARE WITHIN NORMAL LIMITS (WNL) UNLESS OTHERWISE STATED BELOW. ( ALL OF THE BELOW CATEGORIES OF THE List of hospitals in the [place] HAVE BEEN REVIEWED (reviewed 2/27/2018) AND UPDATED AS DEEMED APPROPRIATE )  General Presentation age appropriate and casually dressed, cooperative   Orientation oriented to time, place and person   Vital Signs  See below (reviewed 2/27/2018); Vital Signs (BP, Pulse, & Temp) are within normal limits if not listed below.    Gait and Station Stable/steady, no ataxia   Musculoskeletal System No extrapyramidal symptoms (EPS); no abnormal muscular movements or Tardive Dyskinesia (TD); muscle strength and tone are within normal limits   Language No aphasia or dysarthria   Speech:  normal pitch and normal volume   Thought Processes (+)Illogical; normal rate of thoughts; good abstract reasoning/computation   Thought Associations goal directed   Thought Content (+)Pentecostal delusions; paranoia   Suicidal Ideations none   Homicidal Ideations none   Mood:  euthymic   Affect:  euthymic   Memory recent  intact   Memory remote:  intact   Concentration/Attention:  distractible   Fund of Knowledge avg.    Insight:  fair   Reliability fair   Judgment:  fair          VITALS:     Patient Vitals for the past 24 hrs:   Temp Pulse Resp BP SpO2   02/27/18 1625 98.6 °F (37 °C) 75 16 112/75 98 %   02/27/18 1200 98.2 °F (36.8 °C) 84 16 125/76 -   02/27/18 0815 98.6 °F (37 °C) 92 16 (!) 139/93 100 %   02/26/18 2008 98.6 °F (37 °C) 89 18 137/83 -     Wt Readings from Last 3 Encounters:   02/27/18 77.1 kg (169 lb 15.6 oz)   02/25/18 77.1 kg (170 lb)     Temp Readings from Last 3 Encounters:   02/27/18 98.6 °F (37 °C)   02/25/18 98.6 °F (37 °C)     BP Readings from Last 3 Encounters:   02/27/18 112/75   02/25/18 114/70     Pulse Readings from Last 3 Encounters:   02/27/18 75   02/25/18 78            DATA     LABORATORY DATA:(reviewed/updated 2/27/2018)  Recent Results (from the past 24 hour(s))   HEMOGLOBIN A1C WITH EAG    Collection Time: 02/27/18  6:07 AM   Result Value Ref Range    Hemoglobin A1c 4.9 4.2 - 6.3 %    Est. average glucose Cannot be calculated mg/dL   GLUCOSE, FASTING    Collection Time: 02/27/18  6:07 AM   Result Value Ref Range    Glucose 89 65 - 100 MG/DL   LIPID PANEL    Collection Time: 02/27/18  6:07 AM   Result Value Ref Range    LIPID PROFILE          Cholesterol, total 245 (H) <200 MG/DL    Triglyceride 93 <150 MG/DL    HDL Cholesterol 69 MG/DL    LDL, calculated 157.4 (H) 0 - 100 MG/DL    VLDL, calculated 18.6 MG/DL    CHOL/HDL Ratio 3.6 0 - 5.0     METABOLIC PANEL, BASIC    Collection Time: 02/27/18  6:07 AM   Result Value Ref Range    Sodium 139 136 - 145 mmol/L Potassium 4.0 3.5 - 5.1 mmol/L    Chloride 108 97 - 108 mmol/L    CO2 24 21 - 32 mmol/L    Anion gap 7 5 - 15 mmol/L    Glucose 86 65 - 100 mg/dL    BUN 10 6 - 20 MG/DL    Creatinine 1.03 (H) 0.55 - 1.02 MG/DL    BUN/Creatinine ratio 10 (L) 12 - 20      GFR est AA >60 >60 ml/min/1.73m2    GFR est non-AA 54 (L) >60 ml/min/1.73m2    Calcium 8.6 8.5 - 10.1 MG/DL     No results found for: VALF2, VALAC, VALP, VALPR, DS6, CRBAM, CRBAMP, CARB2, XCRBAM  No results found for: LITHM   RADIOLOGY REPORTS:(reviewed/updated 2/27/2018)  Ct Head Wo Cont    Result Date: 2/25/2018  EXAM:  CT HEAD WO CONT INDICATION:   Confusion/delirium, altered LOC, unexplained COMPARISON: None. CONTRAST:  None. TECHNIQUE: Unenhanced CT of the head was performed using 5 mm images. Brain and bone windows were generated. CT dose reduction was achieved through use of a standardized protocol tailored for this examination and automatic exposure control for dose modulation. FINDINGS: The ventricles and sulci are normal in size, shape and configuration and midline. There is periventricular hypoattenuation. There is no intracranial hemorrhage, extra-axial collection, mass, mass effect or midline shift. The basilar cisterns are open. No acute infarct is identified. The bone windows demonstrate no abnormalities. The visualized portions of the paranasal sinuses and mastoid air cells are clear. IMPRESSION: No acute findings.           MEDICATIONS     ALL MEDICATIONS:   Current Facility-Administered Medications   Medication Dose Route Frequency    risperiDONE (RisperDAL) tablet 1 mg  1 mg Oral QHS    lisinopril (PRINIVIL, ZESTRIL) tablet 20 mg  20 mg Oral DAILY    pantoprazole (PROTONIX) tablet 40 mg  40 mg Oral ACB    ziprasidone (GEODON) 20 mg in sterile water (preservative free) 1 mL injection  20 mg IntraMUSCular BID PRN    OLANZapine (ZyPREXA) tablet 5 mg  5 mg Oral Q6H PRN    benztropine (COGENTIN) tablet 2 mg  2 mg Oral BID PRN    benztropine (COGENTIN) injection 2 mg  2 mg IntraMUSCular BID PRN    LORazepam (ATIVAN) injection 2 mg  2 mg IntraMUSCular Q4H PRN    LORazepam (ATIVAN) tablet 1 mg  1 mg Oral Q4H PRN    zolpidem (AMBIEN) tablet 5 mg  5 mg Oral QHS PRN    acetaminophen (TYLENOL) tablet 650 mg  650 mg Oral Q4H PRN    ibuprofen (MOTRIN) tablet 400 mg  400 mg Oral Q8H PRN    magnesium hydroxide (MILK OF MAGNESIA) 400 mg/5 mL oral suspension 30 mL  30 mL Oral DAILY PRN    nicotine (NICODERM CQ) 21 mg/24 hr patch 1 Patch  1 Patch TransDERmal DAILY PRN      SCHEDULED MEDICATIONS:   Current Facility-Administered Medications   Medication Dose Route Frequency    risperiDONE (RisperDAL) tablet 1 mg  1 mg Oral QHS    lisinopril (PRINIVIL, ZESTRIL) tablet 20 mg  20 mg Oral DAILY    pantoprazole (PROTONIX) tablet 40 mg  40 mg Oral ACB          ASSESSMENT & PLAN     DIAGNOSES REQUIRING ACTIVE TREATMENT AND MONITORING: (reviewed/updated 2/27/2018)  Patient Active Hospital Problem List:   Psychotic disorder (2/25/2018)    Assessment: moderate to severe, early signs of improvement    Plan: Agree with inpatient hospitalization for further stabilization, safety monitoring and medication management  Provided supportive psychotherapy   Medications- continue Risperdal 1mg at night      In summary, Suzie Perla, is a 65 y.o.  female who presents with a severe exacerbation of the principal diagnosis of Psychotic disorder  Patient's condition is  improving. Patient requires continued inpatient hospitalization for further stabilization, safety monitoring and medication management. I will continue to coordinate the provision of individual, milieu, occupational, group, and substance abuse therapies to address target symptoms/diagnoses as deemed appropriate for the individual patient.   A coordinated, multidisplinary treatment team round was conducted with the patient (this team consists of the nurse, psychiatric unit pharmcist,  and writer). Complete current electronic health record for patient has been reviewed today including consultant notes, ancillary staff notes, nurses and psychiatric tech notes. Suicide risk assessment completed and patient deemed to be of low risk for suicide at this time. The following regarding medications was addressed during rounds with patient:   the risks and benefits of the proposed medication. The patient was given the opportunity to ask questions. Informed consent given to the use of the above medications. Will continue to adjust psychiatric and non-psychiatric medications (see above \"medication\" section and orders section for details) as deemed appropriate & based upon diagnoses and response to treatment. I will continue to order blood tests/labs and diagnostic tests as deemed appropriate and review results as they become available (see orders for details and above listed lab/test results). I will order psychiatric records from previous [facility] hospitals to further elucidate the nature of patient's psychopathology and review once available. I will gather additional collateral information from friends, family and o/p treatment team to further elucidate the nature of patient's psychopathology and baselline level of psychiatric functioning. I certify that this patient's inpatient psychiatric hospital services furnished since the previous certification were, and continue to be, required for treatment that could reasonably be expected to improve the patient's condition, or for diagnostic study, and that the patient continues to need, on a daily basis, active treatment furnished directly by or requiring the supervision of inpatient psychiatric facility personnel. In addition, the hospital records show that services furnished were intensive treatment services, admission or related services, or equivalent services.     EXPECTED DISCHARGE DATE/DAY: TBD     DISPOSITION: Home       Signed By: Bryce Gore MD  2/27/2018

## 2018-02-28 NOTE — PROGRESS NOTES
Problem: Psychosis  Goal: *STG/LTG: Complies with medication therapy  Outcome: Progressing Towards Goal  Pt is in the dayroom, singing hymns loudly in the dayroom, disruptive to the milieu. Not receptive to redirection. Pt given Zyprexa 5 mg and Ativan 1 mg at 1738.    1830  Pt is resting quietly in bed.

## 2018-02-28 NOTE — BH NOTES
.During rounds patient was in bed asleep as respirations were noted as even and unlabored as chest was rising and falling.  Will continue to monitor for safety throughout shift

## 2018-02-28 NOTE — INTERDISCIPLINARY ROUNDS
Behavioral Health Interdisciplinary Rounds     Patient Name: Augusto Romano  Age: 65 y.o. Room/Bed:  4/  Primary Diagnosis: Psychotic disorder   Admission Status: Involuntary Commitment     Readmission within 30 days: no  Power of  in place: no  Patient requires a blocked bed:  Plan to unblock her bed        Reason for blocked bed:     VTE Prophylaxis: No  Flu vaccine given : no   Mobility needs/Fall risk: no    Nutritional Plan: no  Consults:          Labs/Testing due today?: no    Sleep hours: 7.0       Participation in Care/Groups:  no  Medication Compliant?: Yes  PRNS (last 24 hours): Antipsychotic (PO) and Antianxiety    Restraints (last 24 hours):  no  Substance Abuse:  no  CIWA (range last 24 hours):  COWS (range last 24 hours):   Alcohol screening (AUDIT) completed -  AUDIT Score: 1  If applicable, date SBIRT discussed in treatment team AND documented:   Tobacco - patient is a smoker:    Date tobacco education completed by RN: 02/27/18  24 hour chart check complete: yes     Patient goal(s) for today: \" God sent me here to [facility] to  volunteer \"  Treatment team focus/goals: Plan to titrate her medications   Progress note She continues to require prn medications in the evening . She continues to be delusional     LOS:  3  Expected LOS: TBD    Financial concerns/prescription coverage:  She does has insurance   Date of last family contact:       Family requesting physician contact today:  no  Discharge plan: lives in a shelter   Guns in the home:  no       Outpatient provider(s): TBD     Participating treatment team members: Laurie Polanco Dr., PharmD.

## 2018-02-28 NOTE — PROGRESS NOTES
Problem: Psychosis  Goal: *STG: Remains safe in hospital  Outcome: Progressing Towards Goal  Pt remains safe in hospital on q 15 min safety checks. NAD. Respirations regular and unlabored. Showered today. Delayed response. Rastafari preoccupation. Poor insight. Calm cooperative.

## 2018-03-01 PROCEDURE — 74011250637 HC RX REV CODE- 250/637: Performed by: HOSPITALIST

## 2018-03-01 PROCEDURE — 74011250637 HC RX REV CODE- 250/637: Performed by: PSYCHIATRY & NEUROLOGY

## 2018-03-01 PROCEDURE — 65220000003 HC RM SEMIPRIVATE PSYCH

## 2018-03-01 RX ORDER — RISPERIDONE 1 MG/1
2 TABLET, FILM COATED ORAL
Status: DISCONTINUED | OUTPATIENT
Start: 2018-03-01 | End: 2018-03-02

## 2018-03-01 RX ADMIN — RISPERIDONE 2 MG: 1 TABLET ORAL at 21:27

## 2018-03-01 RX ADMIN — LISINOPRIL 20 MG: 20 TABLET ORAL at 08:27

## 2018-03-01 RX ADMIN — PANTOPRAZOLE SODIUM 40 MG: 40 TABLET, DELAYED RELEASE ORAL at 06:30

## 2018-03-01 NOTE — PROGRESS NOTES
Problem: Anxiety-Behavioral Health (Adult/Pediatric)  Goal: *STG: Participates in treatment plan  Outcome: Progressing Towards Goal  Pt's visible on the unit. Pt's cooperative. Less religiously preoccupied. Pt is less intrusive. Eating and drinking well. Pt slept poorly. Pt denies any suicidal or homicidal thoughts. Contracts for safety. Remains on q 15 min safety checks.

## 2018-03-01 NOTE — PROGRESS NOTES
Problem: Falls - Risk of  Goal: *Absence of Falls  Document Denae Fall Risk and appropriate interventions in the flowsheet. Outcome: Progressing Towards Goal  Fall Risk Interventions:            Medication Interventions: Teach patient to arise slowly     Patient in bed asleep at start of shift with respirations noted as even and unlabored as chest was rising and falling Will continue to monitor for safety throughout shift.

## 2018-03-01 NOTE — BH NOTES
GROUP THERAPY PROGRESS NOTE    Destin Garcia is participating in Pre- Discharge Planning Group    Group time: 45 minutes    Personal goal for participation: Readiness for discharge    Goal orientation: Making The Most of Tx Team meeting    Group therapy participation: passive     Therapeutic interventions reviewed and discussed:     Impression of participation: Pt st quietly in group w/o any engagement with her peers nor US despite frequent prompts to encourage her to join the discussion

## 2018-03-01 NOTE — BH NOTES
GROUP THERAPY PROGRESS NOTE    Mal Johnson participated in the Acute Unit's afternoon Process Group for yesterday, with a focus on identifying feelings, planning for the rest of the day, and preparing for discharge. Group time: 60 minutes. Personal goal for participation: To increase the capacity to improve ones mood and structure. Goal orientation: The patient will be able to identify their feelings, develop a plan for structuring their day, and discharge planning. Group therapy participation: With prompting, this patient partially participated in the group. Therapeutic interventions reviewed and discussed: The group members were asked to introduce themselves to each other and to see if they could identify an emotion they are having and/or let the group know what they want to focus on for the day as they continue to make discharge plans. Impression of participation: The patient joined the group about fifteen minutes after it started and seemed to be taking notes for herself as she listened to her peers. She waited for most of her peers to speak and then she identified the items that she heard that might be helpful to her. She recognized that her affect was anxious and she wrote down a couple of suggestions on how to deal with her anxiety. For example, she indicated she wanted to practice recognizing and giving her anxiety a limited amount of time to her thought processes and that she could try to distract herself. Her mood reflected her affect, but her increased clarity of thought allowed her to think about how to consider using the group discussion for herself. She was able to listen to her peers and did not vocalize being religiously preoccupied. She looked less frightened than she appeared earlier in the week. She expressed no current SI/HI and displayed no overt psychotic symptoms in this group.

## 2018-03-01 NOTE — INTERDISCIPLINARY ROUNDS
Behavioral Health Interdisciplinary Rounds     Patient Name: Chetna Koehler  Age: 65 y.o. Room/Bed:  734/  Primary Diagnosis: Psychotic disorder   Admission Status: Involuntary Commitment     Readmission within 30 days: no  Power of  in place: no  Patient requires a blocked bed: Plan to unblock          Reason for blocked bed:    VTE Prophylaxis: No  Flu vaccine given : no   Mobility needs/Fall risk: no    Nutritional Plan: no  Consults:          Labs/Testing due today?: no    Sleep hours:  1.25      Participation in Care/Groups:  no  Medication Compliant?: Yes  PRNS (last 24 hours): Antipsychotic (IM) and Antianxiety    Restraints (last 24 hours):  no  Substance Abuse:  no  CIWA (range last 24 hours):  COWS (range last 24 hours):   Alcohol screening (AUDIT) completed -  AUDIT Score: 1  If applicable, date SBIRT discussed in treatment team AND documented:   Tobacco - patient is a smoker: no   Date tobacco education completed by RN:   24 hour chart check complete: yes     Patient goal(s) for today:   Treatment team focus/goals: Plan to titrate her medications   Progress note - She did not sleep last night and has been sleepy during the day. LOS:  4  Expected LOS: TBD     Financial concerns/prescription coverage:    Date of last family contact:       Family requesting physician contact today:    Discharge plan: shelter  Guns in the home:   no      Outpatient provider(s):     Participating treatment team members: Irish Shallow, Ritta Cushing Dr. Anastasio Nanas, PharmD.

## 2018-03-01 NOTE — BH NOTES
PSYCHIATRIC PROGRESS NOTE         Patient Name  Christiano Fuller   Date of Birth 1955   [facility] [id number]   Medical Record Number  [id number]      Age  65 y.o. PCP Bianca Sanchez, MD   Admit date:  2/25/2018    Room Number  734/01  @ [facility]   Date of Service  2/28/18          PSYCHOTHERAPY SESSION NOTE:  Length of psychotherapy session: 20 minutes    Main condition/diagnosis/issues treated during session today, 3/1/2018 : delusional    I employed Cognitive Behavioral therapy techniques, Reality-Oriented psychotherapy, as well as supportive psychotherapy in regards to various ongoing psychosocial stressors, including the following: pre-admission and current problems; housing issues; occupational issues; academic issues; legal issues; medical issues; and stress of hospitalization. Interpersonal relationship issues and psychodynamic conflicts explored. Attempts made to alleviate maladaptive patterns. We, also, worked on issues of denial & effects of substance dependency/use     Overall, patient is not progressing    Treatment Plan Update (reviewed an updated 3/1/2018) : I will modify psychotherapy tx plan by implementing more stress management strategies, building upon cognitive behavioral techniques, increasing coping skills, as well as shoring up psychological defenses). E & M PROGRESS NOTE:         HISTORY       CC:  \"I\"m fine\"  HISTORY OF PRESENT ILLNESS/INTERVAL HISTORY:  (reviewed/updated 3/1/2018). per initial evaluation:   The patient, Christiano Fuller, is a 65 y.o.  OTHER female with unknown psychiatric history, who presents at this time with complaints of (and/or evidence of) the following emotional symptoms: psychotic behavior. Additional symptomatology include bizarre and impulsive behavior. The patient moved to the [address] [place] [address] [place] [address], two years ago based on message from Zach 1827.  She came with a [employer] and she describes 'fleeing' from her  and his family. 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 She left [facility] due to fear of the [place] trying to kill her. She has been staying at the [facility] in [place] because she was looking for a safe place to stay, pray to God. The staff called 911 due to her paranoia, stealing residents blankets, poor sleep, etc. Since admission, she has expressed fear of color Red which she associates with the Devil. The above symptoms have been present for several weeks, possibly chronic. These symptoms are of moderate to high severity. These symptoms are constant in nature. Go Musa presents/reports/evidences the following emotional symptoms today, 3/1/2018:delusions and psychotic behavior. The above symptoms have been present for unknown length of time, possibly months to years. These symptoms are of high severity. The symptoms are constant  in nature. Additional symptomatology and features include delusion that she was directed by God to be a spiritual fellowship volunteer at [facility] . Patient struggles to see that she is a patient in the hospital . Compliant with medications. SIDE EFFECTS: (reviewed/updated 3/1/2018)  None reported or admitted to. ALLERGIES:(reviewed/updated 3/1/2018)  No Known Allergies   MEDICATIONS PRIOR TO ADMISSION:(reviewed/updated 3/1/2018)  Prescriptions Prior to Admission   Medication Sig    lisinopril (PRINIVIL, ZESTRIL) 20 mg tablet Take 20 mg by mouth daily.  omeprazole (PRILOSEC) 20 mg capsule Take 20 mg by mouth three (3) times daily. PAST MEDICAL HISTORY: Past medical history from the initial psychiatric evaluation has been reviewed (reviewed/updated 3/1/2018) with no additional updates (I asked patient and no additional past medical history provided).  Past Medical History:   Diagnosis Date    Hypertension     Ill-defined condition     GERD   No past surgical history on file. SOCIAL HISTORY: Social history from the initial psychiatric evaluation has been reviewed (reviewed/updated 3/1/2018) with no additional updates (I asked patient and no additional social history provided). Social History     Social History    Marital status: LEGALLY      Spouse name: N/A    Number of children: N/A    Years of education: N/A     Occupational History    Not on file. Social History Main Topics    Smoking status: Never Smoker    Smokeless tobacco: Not on file    Alcohol use No    Drug use: Not on file    Sexual activity: Not on file     Other Topics Concern    Not on file     Social History Narrative    No narrative on file      FAMILY HISTORY: Family history from the initial psychiatric evaluation has been reviewed (reviewed/updated 3/1/2018) with no additional updates (I asked patient and no additional family history provided). No family history on file. REVIEW OF SYSTEMS: (reviewed/updated 3/1/2018)  Appetite:good   Sleep: good   All other Review of Systems: Negative except delusions, bizarre behavior         [facility] ([facility]):    [facility] FINDINGS ARE WITHIN NORMAL LIMITS (WNL) UNLESS OTHERWISE STATED BELOW. ( ALL OF THE BELOW CATEGORIES OF THE [facility] HAVE BEEN REVIEWED (reviewed 3/1/2018) AND UPDATED AS DEEMED APPROPRIATE )  General Presentation age appropriate and casually dressed, cooperative   Orientation oriented to time, place and person   Vital Signs  See below (reviewed 3/1/2018); Vital Signs (BP, Pulse, & Temp) are within normal limits if not listed below.    Gait and Station Stable/steady, no ataxia   Musculoskeletal System No extrapyramidal symptoms (EPS); no abnormal muscular movements or Tardive Dyskinesia (TD); muscle strength and tone are within normal limits   Language No aphasia or dysarthria   Speech:  normal pitch and normal volume   Thought Processes normal rate of thoughts; irrational thinking; Illogical thoughts   Thought Associations goal directed   Thought Content (+)delusional- Latter day, bizarre   Suicidal Ideations none   Homicidal Ideations none   Mood:  euthymic   Affect:  mood-congruent   Memory recent  good   Memory remote:  good   Concentration/Attention:  wnl   Fund of Knowledge wnl   Insight:  poor   Reliability poor   Judgment:  poor          VITALS:     Patient Vitals for the past 24 hrs:   Temp Pulse Resp BP SpO2   02/28/18 2015 99.1 °F (37.3 °C) 78 14 (!) 163/101 -   02/28/18 1629 98 °F (36.7 °C) 86 16 (!) 157/97 -   02/28/18 1127 99 °F (37.2 °C) (!) 101 16 122/78 -   02/28/18 1029 98.6 °F (37 °C) 93 16 136/83 100 %   02/28/18 0740 - - 12 - -     Wt Readings from Last 3 Encounters:   02/27/18 77.1 kg (169 lb 15.6 oz)   02/25/18 77.1 kg (170 lb)     Temp Readings from Last 3 Encounters:   02/25/18 98.6 °F (37 °C)     BP Readings from Last 3 Encounters:   02/28/18 (!) 163/101   02/25/18 114/70     Pulse Readings from Last 3 Encounters:   02/28/18 78   02/25/18 78            DATA     LABORATORY DATA:(reviewed/updated 3/1/2018)  No results found for this or any previous visit (from the past 24 hour(s)). No results found for: VALF2, VALAC, VALP, VALPR, DS6, CRBAM, CRBAMP, CARB2, XCRBAM  No results found for: LITHM   RADIOLOGY REPORTS:(reviewed/updated 3/1/2018)  Ct Head Wo Cont    Result Date: 2/25/2018  EXAM:  CT HEAD WO CONT INDICATION:   Confusion/delirium, altered LOC, unexplained COMPARISON: None. CONTRAST:  None. TECHNIQUE: Unenhanced CT of the head was performed using 5 mm images. Brain and bone windows were generated. CT dose reduction was achieved through use of a standardized protocol tailored for this examination and automatic exposure control for dose modulation. FINDINGS: The ventricles and sulci are normal in size, shape and configuration and midline. There is periventricular hypoattenuation.  There is no intracranial hemorrhage, extra-axial collection, mass, mass effect or midline shift. The basilar cisterns are open. No acute infarct is identified. The bone windows demonstrate no abnormalities. The visualized portions of the paranasal sinuses and mastoid air cells are clear. IMPRESSION: No acute findings.           MEDICATIONS     ALL MEDICATIONS:   Current Facility-Administered Medications   Medication Dose Route Frequency    risperiDONE (RisperDAL) tablet 2 mg  2 mg Oral QHS    lisinopril (PRINIVIL, ZESTRIL) tablet 20 mg  20 mg Oral DAILY    pantoprazole (PROTONIX) tablet 40 mg  40 mg Oral ACB    ziprasidone (GEODON) 20 mg in sterile water (preservative free) 1 mL injection  20 mg IntraMUSCular BID PRN    OLANZapine (ZyPREXA) tablet 5 mg  5 mg Oral Q6H PRN    benztropine (COGENTIN) tablet 2 mg  2 mg Oral BID PRN    benztropine (COGENTIN) injection 2 mg  2 mg IntraMUSCular BID PRN    LORazepam (ATIVAN) injection 2 mg  2 mg IntraMUSCular Q4H PRN    LORazepam (ATIVAN) tablet 1 mg  1 mg Oral Q4H PRN    zolpidem (AMBIEN) tablet 5 mg  5 mg Oral QHS PRN    acetaminophen (TYLENOL) tablet 650 mg  650 mg Oral Q4H PRN    ibuprofen (MOTRIN) tablet 400 mg  400 mg Oral Q8H PRN    magnesium hydroxide (MILK OF MAGNESIA) 400 mg/5 mL oral suspension 30 mL  30 mL Oral DAILY PRN    nicotine (NICODERM CQ) 21 mg/24 hr patch 1 Patch  1 Patch TransDERmal DAILY PRN      SCHEDULED MEDICATIONS:   Current Facility-Administered Medications   Medication Dose Route Frequency    risperiDONE (RisperDAL) tablet 2 mg  2 mg Oral QHS    lisinopril (PRINIVIL, ZESTRIL) tablet 20 mg  20 mg Oral DAILY    pantoprazole (PROTONIX) tablet 40 mg  40 mg Oral ACB          ASSESSMENT & PLAN     DIAGNOSES REQUIRING ACTIVE TREATMENT AND MONITORING: (reviewed/updated 3/1/2018)  Patient Active Hospital Problem List:   Psychotic disorder (2/25/2018)    Assessment: severe; schizophrenia most likely diagnosis without evidence of mood component    Plan: Agree with inpatient hospitalization for further stabilization, safety monitoring and medication management  Medications- risperdal 2mg at night   consult      In summary, Amanda Baxter, is a 65 y.o.  female who presents with a severe exacerbation of the principal diagnosis of Psychotic disorder  Patient's condition is improving. Patient requires continued inpatient hospitalization for further stabilization, safety monitoring and medication management. I will continue to coordinate the provision of individual, milieu, occupational, group, and substance abuse therapies to address target symptoms/diagnoses as deemed appropriate for the individual patient. A coordinated, multidisplinary treatment team round was conducted with the patient (this team consists of the nurse, psychiatric unit pharmcist,  and writer). Complete current electronic health record for patient has been reviewed today including consultant notes, ancillary staff notes, nurses and psychiatric tech notes. Suicide risk assessment completed and patient deemed to be of low risk for suicide at this time. The following regarding medications was addressed during rounds with patient:   the risks and benefits of the proposed medication. The patient was given the opportunity to ask questions. Informed consent given to the use of the above medications. Will continue to adjust psychiatric and non-psychiatric medications (see above \"medication\" section and orders section for details) as deemed appropriate & based upon diagnoses and response to treatment. I will continue to order blood tests/labs and diagnostic tests as deemed appropriate and review results as they become available (see orders for details and above listed lab/test results). I will order psychiatric records from previous [facility] hospitals to further elucidate the nature of patient's psychopathology and review once available.     I will gather additional collateral information from friends, family and o/p treatment team to further elucidate the nature of patient's psychopathology and baselline level of psychiatric functioning. I certify that this patient's inpatient psychiatric hospital services furnished since the previous certification were, and continue to be, required for treatment that could reasonably be expected to improve the patient's condition, or for diagnostic study, and that the patient continues to need, on a daily basis, active treatment furnished directly by or requiring the supervision of inpatient psychiatric facility personnel. In addition, the hospital records show that services furnished were intensive treatment services, admission or related services, or equivalent services.     EXPECTED DISCHARGE DATE/DAY: TBD     DISPOSITION: Home       Signed By:   Misti Tang MD  2/28/18

## 2018-03-01 NOTE — BH NOTES
GROUP THERAPY PROGRESS NOTE    Gerardo Howell did not participate in yesterday's Acute Unit's Process Group, with a focus identifying feelings, planning for the rest of the day, and discussing discharge.

## 2018-03-02 PROCEDURE — 74011250637 HC RX REV CODE- 250/637: Performed by: HOSPITALIST

## 2018-03-02 PROCEDURE — 65220000003 HC RM SEMIPRIVATE PSYCH

## 2018-03-02 PROCEDURE — 74011250637 HC RX REV CODE- 250/637: Performed by: PSYCHIATRY & NEUROLOGY

## 2018-03-02 RX ADMIN — LISINOPRIL 20 MG: 20 TABLET ORAL at 08:59

## 2018-03-02 RX ADMIN — PANTOPRAZOLE SODIUM 40 MG: 40 TABLET, DELAYED RELEASE ORAL at 06:52

## 2018-03-02 RX ADMIN — RISPERIDONE 4 MG: 3 TABLET ORAL at 21:15

## 2018-03-02 NOTE — BH NOTES
GROUP THERAPY PROGRESS NOTE    Leamon Simmonds is participating in [place]. Group time: 15 minutes    Personal goal for participation:  To help with her meds    Goal orientation: community    Group therapy participation: active    Therapeutic interventions reviewed and discussed: Review of unit guidelines and setting a daily goal.    Impression of participation: active

## 2018-03-02 NOTE — BH NOTES
1535 EST, patients mother called from [facility] at [phone]  This writer attempted to call mother back, however number would not dial out   attempted several times and was also not able to reach mother back    It is 6 +hours ahead of time here  Will flag SW if alternate hospital cell could be utilized to call mother tomorrow, who seemed very concerned

## 2018-03-02 NOTE — BH NOTES
LEISURE GROUP THERAPY PROGRESS NOTE    The patient Blanco caldwell 65 y.o. female is participating in Leisure Group. Group time: 45 minutes    Personal goal for participation: Daily social interactions with other peers & staff.     Goal orientation: Relaxation activities    Group therapy participation: active    Therapeutic interventions reviewed and discussed:  Yes    Impression of participation: Steve Emmanuel  3/1/2018  8:48 PM

## 2018-03-02 NOTE — BH NOTES
GROUP THERAPY PROGRESS NOTE    Kell Robin participated in the Acute Unit's afternoon Process Group for yesterday, with a focus on identifying feelings, planning for the rest of the day, and preparing for discharge. Group time: 60 minutes. Personal goal for participation: To increase the capacity to improve ones mood and structure. Goal orientation: The patient will be able to identify their feelings, develop a plan for structuring their day, and discharge planning. Group therapy participation: With prompting, this patient partially participated in the group. Therapeutic interventions reviewed and discussed: The group members were asked to introduce themselves to each other and to see if they could identify an emotion they are having and/or let the group know what they want to focus on for the day as they continue to make discharge plans. Impression of participation: The patient joined the group about ten minutes after it started. She sat quietly and took notes as she listened to her peers. She did not speak until the group was about to finish. She summarized a couple of points in the conversation that she could apply to herself. For example, she heard that sometimes \"Nothing is ever fully finished until our end\" and the question of \"How do we do what we do not know how to do? \" is one she could relate to. The patient expressed no SI/HI and displayed no overt psychotic symptoms in this group. Her affect was anxious and her mood matched her affect. She attempted to preach and suggest \"Tejinder\" is the solution to every problem but she was re-directed because it was the end of group and she was informed in a previous group that the undersigned would not let her preach or witness at the end of a future group. She is still religiously preoccupied and only selectively interacting with her peers.

## 2018-03-02 NOTE — INTERDISCIPLINARY ROUNDS
Behavioral Health Interdisciplinary Rounds     Patient Name: Chuy Zavala  Age: 65 y.o. Room/Bed:  734/  Primary Diagnosis: Psychotic disorder   Admission Status: Involuntary Commitment     Readmission within 30 days: no  Power of  in place: no  Patient requires a blocked bed: no          Reason for blocked bed:     VTE Prophylaxis: No  Flu vaccine given : no   Mobility needs/Fall risk: no    Nutritional Plan: no  Consults:          Labs/Testing due today?: no    Sleep hours:  7.25      Participation in Care/Groups:  no  Medication Compliant?: Yes  PRNS (last 24 hours): Antipsychotic (PO) and Antianxiety    Restraints (last 24 hours):  no  Substance Abuse:  no  CIWA (range last 24 hours):  COWS (range last 24 hours):  Alcohol screening (AUDIT) completed -  AUDIT Score: 1  If applicable, date SBIRT discussed in treatment team AND documented:   Tobacco - patient is a smoker: no   Date tobacco education completed by RN:   24 hour chart check complete: yes     Patient goal(s) for today:   Treatment team focus/goals: Plan to continue to titrate her medications. Progress note - she slept better last night, she is compliant with her medications     LOS:  5  Expected LOS: TBD    Financial concerns/prescription coverage:    Date of last family contact: Tried to call her daughter     Family requesting physician contact today:    Discharge plan: shelter   Guns in the home:   no      Outpatient provider(s): TBD   Participating treatment team members: Chuy Zavala,  Dr. Irvin Reed.

## 2018-03-02 NOTE — PROGRESS NOTES
Problem: Falls - Risk of  Goal: *Absence of Falls  Document Denae Fall Risk and appropriate interventions in the flowsheet. Outcome: Progressing Towards Goal  Fall Risk Interventions: In bed asleep during rounds with respirations noted as even and unlabored as chest was rising and falling. Will continue to monitor for safety throughout shift for safety.        Medication Interventions: Teach patient to arise slowly

## 2018-03-02 NOTE — PROGRESS NOTES
Problem: Psychosis  Goal: *STG: Remains safe in hospital  Outcome: Progressing Towards Goal  Pt denies any suicidal or homicidal thoughts. Contracts for safety. Remains on q 15 min safety checks. Problem: *Psychosis: Discharge Outcome  Goal: *Absent or Controlled Active Psychotic Symptoms  Outcome: Not Progressing Towards Goal  Stated this am \"God is telling me to take care of the patients here. I need gloves to help change linen and give help with am care. \"Patient was able to accept redirection. Problem: Falls - Risk of  Goal: *Absence of Falls  Document Denae Fall Risk and appropriate interventions in the flowsheet. Outcome: Progressing Towards Goal  Fall Risk Interventions:Gait is steady no fall history. Patient is wearing slip resistant footwear.             Medication Interventions: Assess postural VS orthostatic hypotension         History of Falls Interventions: Room close to nurse's station

## 2018-03-02 NOTE — BH NOTES
Care Management Note:    NADIA tried to return a call to patients daughter in Court - Dr. Bonnie Quintanilla - International Number [phone] - Not a correct number

## 2018-03-02 NOTE — PROGRESS NOTES
Problem: Anxiety-Behavioral Health (Adult/Pediatric)  Goal: *STG: Attends activities and groups  Outcome: Progressing Towards Goal  Pt visible on unit, follows directions from staff, interacts with peers

## 2018-03-02 NOTE — BH NOTES
PSYCHIATRIC PROGRESS NOTE         Patient Name  Torito Uribe   Date of Birth 1955   [facility] [id number]   Medical Record Number  [id number]      Age  65 y.o. PCP Bianca Sanchez, MD   Admit date:  2/25/2018    Room Number  734/01  @ [address]   Date of Service  3/1/18          PSYCHOTHERAPY SESSION NOTE:  Length of psychotherapy session: 20 minutes    Main condition/diagnosis/issues treated during session today, 3/1/2018 : delusional    I employed Cognitive Behavioral therapy techniques, Reality-Oriented psychotherapy, as well as supportive psychotherapy in regards to various ongoing psychosocial stressors, including the following: pre-admission and current problems; housing issues; occupational issues; academic issues; legal issues; medical issues; and stress of hospitalization. Interpersonal relationship issues and psychodynamic conflicts explored. Attempts made to alleviate maladaptive patterns. We, also, worked on issues of denial & effects of substance dependency/use     Overall, patient is not progressing    Treatment Plan Update (reviewed an updated 3/1/2018) : I will modify psychotherapy tx plan by implementing more stress management strategies, building upon cognitive behavioral techniques, increasing coping skills, as well as shoring up psychological defenses). E & M PROGRESS NOTE:         HISTORY       CC:  \"I\"m fine\"  HISTORY OF PRESENT ILLNESS/INTERVAL HISTORY:  (reviewed/updated 3/1/2018). per initial evaluation:   The patient, Torito Uribe, is a 65 y.o.  OTHER female with unknown psychiatric history, who presents at this time with complaints of (and/or evidence of) the following emotional symptoms: psychotic behavior. Additional symptomatology include bizarre and impulsive behavior. The patient moved to the [address] [place] [address],[address] from [facility], two years ago based on message from Zach [address]27.  She came with a [employer] and she describes 'fleeing' from her  and his family. 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 She left [facility] due to fear of the [place] trying to kill her. She has been staying at the [facility] in [place] because she was looking for a safe place to stay, pray to God. The staff called 911 due to her paranoia, stealing residents blankets, poor sleep, etc. Since admission, she has expressed fear of color Red which she associates with the Devil. The above symptoms have been present for several weeks, possibly chronic. These symptoms are of moderate to high severity. These symptoms are constant in nature. Crclaudy Jennifer presents/reports/evidences the following emotional symptoms today, 3/1/2018:delusions and psychotic behavior. The above symptoms have been present for unknown length of time, possibly months to years. These symptoms are of high severity. The symptoms are constant  in nature. Additional symptomatology and features include delusion that she was directed by God to wake up and pray all night, therefore poor sleep over night. Continued belief that she is a volunteer in the hospital Compliant with medications. Very poor insight      SIDE EFFECTS: (reviewed/updated 3/1/2018)  None reported or admitted to. ALLERGIES:(reviewed/updated 3/1/2018)  No Known Allergies   MEDICATIONS PRIOR TO ADMISSION:(reviewed/updated 3/1/2018)  Prescriptions Prior to Admission   Medication Sig    lisinopril (PRINIVIL, ZESTRIL) 20 mg tablet Take 20 mg by mouth daily.  omeprazole (PRILOSEC) 20 mg capsule Take 20 mg by mouth three (3) times daily. PAST MEDICAL HISTORY: Past medical history from the initial psychiatric evaluation has been reviewed (reviewed/updated 3/1/2018) with no additional updates (I asked patient and no additional past medical history provided).    Past Medical History:   Diagnosis Date    Hypertension     Ill-defined condition     GERD   No past surgical history on file. SOCIAL HISTORY: Social history from the initial psychiatric evaluation has been reviewed (reviewed/updated 3/1/2018) with no additional updates (I asked patient and no additional social history provided). Social History     Social History    Marital status: LEGALLY      Spouse name: N/A    Number of children: N/A    Years of education: N/A     Occupational History    Not on file. Social History Main Topics    Smoking status: Never Smoker    Smokeless tobacco: Not on file    Alcohol use No    Drug use: Not on file    Sexual activity: Not on file     Other Topics Concern    Not on file     Social History Narrative    No narrative on file      FAMILY HISTORY: Family history from the initial psychiatric evaluation has been reviewed (reviewed/updated 3/1/2018) with no additional updates (I asked patient and no additional family history provided). No family history on file. REVIEW OF SYSTEMS: (reviewed/updated 3/1/2018)  Appetite:good   Sleep: good   All other Review of Systems: Negative except delusions, bizarre behavior         [facility] ([facility] [place]    MSE FINDINGS ARE WITHIN NORMAL LIMITS (WNL) UNLESS OTHERWISE STATED BELOW. ( ALL OF THE BELOW CATEGORIES OF THE MSE HAVE BEEN REVIEWED (reviewed 3/1/2018) AND UPDATED AS DEEMED APPROPRIATE )  General Presentation age appropriate and casually dressed, cooperative   Orientation oriented to time, place and person   Vital Signs  See below (reviewed 3/1/2018); Vital Signs (BP, Pulse, & Temp) are within normal limits if not listed below.    Gait and Station Stable/steady, no ataxia   Musculoskeletal System No extrapyramidal symptoms (EPS); no abnormal muscular movements or Tardive Dyskinesia (TD); muscle strength and tone are within normal limits   Language No aphasia or dysarthria   Speech:  normal pitch and normal volume   Thought Processes normal rate of thoughts; irrational thinking; Illogical thoughts   Thought Associations goal directed   Thought Content (+)delusional- Buddhism, bizarre   Suicidal Ideations none   Homicidal Ideations none   Mood:  euthymic   Affect:  mood-congruent   Memory recent  good   Memory remote:  good   Concentration/Attention:  wnl   Fund of Knowledge wnl   Insight:  poor   Reliability poor   Judgment:  poor          VITALS:     Patient Vitals for the past 24 hrs:   Temp Pulse Resp BP SpO2   03/01/18 1939 98.6 °F (37 °C) 87 18 126/78 -   03/01/18 1553 98.8 °F (37.1 °C) 66 16 (!) 144/91 100 %   03/01/18 1143 98.5 °F (36.9 °C) 85 16 143/85 98 %   03/01/18 0808 98.9 °F (37.2 °C) 78 16 147/89 96 %     Wt Readings from Last 3 Encounters:   02/27/18 77.1 kg (169 lb 15.6 oz)   02/25/18 77.1 kg (170 lb)     Temp Readings from Last 3 Encounters:   03/01/18 98.6 °F (37 °C)   02/25/18 98.6 °F (37 °C)     BP Readings from Last 3 Encounters:   03/01/18 126/78   02/25/18 114/70     Pulse Readings from Last 3 Encounters:   03/01/18 87   02/25/18 78            DATA     LABORATORY DATA:(reviewed/updated 3/1/2018)  No results found for this or any previous visit (from the past 24 hour(s)). No results found for: VALF2, VALAC, VALP, VALPR, DS6, CRBAM, CRBAMP, CARB2, XCRBAM  No results found for: LITHM   RADIOLOGY REPORTS:(reviewed/updated 3/1/2018)  Ct Head Wo Cont    Result Date: 2/25/2018  EXAM:  CT HEAD WO CONT INDICATION:   Confusion/delirium, altered LOC, unexplained COMPARISON: None. CONTRAST:  None. TECHNIQUE: Unenhanced CT of the head was performed using 5 mm images. Brain and bone windows were generated. CT dose reduction was achieved through use of a standardized protocol tailored for this examination and automatic exposure control for dose modulation. FINDINGS: The ventricles and sulci are normal in size, shape and configuration and midline. There is periventricular hypoattenuation.  There is no intracranial hemorrhage, extra-axial collection, mass, mass effect or midline shift. The basilar cisterns are open. No acute infarct is identified. The bone windows demonstrate no abnormalities. The visualized portions of the paranasal sinuses and mastoid air cells are clear. IMPRESSION: No acute findings.           MEDICATIONS     ALL MEDICATIONS:   Current Facility-Administered Medications   Medication Dose Route Frequency    risperiDONE (RisperDAL) tablet 2 mg  2 mg Oral QHS    lisinopril (PRINIVIL, ZESTRIL) tablet 20 mg  20 mg Oral DAILY    pantoprazole (PROTONIX) tablet 40 mg  40 mg Oral ACB    ziprasidone (GEODON) 20 mg in sterile water (preservative free) 1 mL injection  20 mg IntraMUSCular BID PRN    OLANZapine (ZyPREXA) tablet 5 mg  5 mg Oral Q6H PRN    benztropine (COGENTIN) tablet 2 mg  2 mg Oral BID PRN    benztropine (COGENTIN) injection 2 mg  2 mg IntraMUSCular BID PRN    LORazepam (ATIVAN) injection 2 mg  2 mg IntraMUSCular Q4H PRN    LORazepam (ATIVAN) tablet 1 mg  1 mg Oral Q4H PRN    zolpidem (AMBIEN) tablet 5 mg  5 mg Oral QHS PRN    acetaminophen (TYLENOL) tablet 650 mg  650 mg Oral Q4H PRN    ibuprofen (MOTRIN) tablet 400 mg  400 mg Oral Q8H PRN    magnesium hydroxide (MILK OF MAGNESIA) 400 mg/5 mL oral suspension 30 mL  30 mL Oral DAILY PRN    nicotine (NICODERM CQ) 21 mg/24 hr patch 1 Patch  1 Patch TransDERmal DAILY PRN      SCHEDULED MEDICATIONS:   Current Facility-Administered Medications   Medication Dose Route Frequency    risperiDONE (RisperDAL) tablet 2 mg  2 mg Oral QHS    lisinopril (PRINIVIL, ZESTRIL) tablet 20 mg  20 mg Oral DAILY    pantoprazole (PROTONIX) tablet 40 mg  40 mg Oral ACB          ASSESSMENT & PLAN     DIAGNOSES REQUIRING ACTIVE TREATMENT AND MONITORING: (reviewed/updated 3/1/2018)  Patient Active Hospital Problem List:   Psychotic disorder (2/25/2018)    Assessment: severe; schizophrenia most likely diagnosis without evidence of mood component    Plan: Agree with inpatient hospitalization for further stabilization, safety monitoring and medication management  Medications- risperdal 2mg at night   consult      In summary, Blanco Macedo, is a 65 y.o.  female who presents with a severe exacerbation of the principal diagnosis of Psychotic disorder  Patient's condition is improving. Patient requires continued inpatient hospitalization for further stabilization, safety monitoring and medication management. I will continue to coordinate the provision of individual, milieu, occupational, group, and substance abuse therapies to address target symptoms/diagnoses as deemed appropriate for the individual patient. A coordinated, multidisplinary treatment team round was conducted with the patient (this team consists of the nurse, psychiatric unit pharmcist,  and writer). Complete current electronic health record for patient has been reviewed today including consultant notes, ancillary staff notes, nurses and psychiatric tech notes. Suicide risk assessment completed and patient deemed to be of low risk for suicide at this time. The following regarding medications was addressed during rounds with patient:   the risks and benefits of the proposed medication. The patient was given the opportunity to ask questions. Informed consent given to the use of the above medications. Will continue to adjust psychiatric and non-psychiatric medications (see above \"medication\" section and orders section for details) as deemed appropriate & based upon diagnoses and response to treatment. I will continue to order blood tests/labs and diagnostic tests as deemed appropriate and review results as they become available (see orders for details and above listed lab/test results). I will order psychiatric records from previous [facility] hospitals to further elucidate the nature of patient's psychopathology and review once available.     I will gather additional collateral information from friends, family and o/p treatment team to further elucidate the nature of patient's psychopathology and baselline level of psychiatric functioning. I certify that this patient's inpatient psychiatric hospital services furnished since the previous certification were, and continue to be, required for treatment that could reasonably be expected to improve the patient's condition, or for diagnostic study, and that the patient continues to need, on a daily basis, active treatment furnished directly by or requiring the supervision of inpatient psychiatric facility personnel. In addition, the hospital records show that services furnished were intensive treatment services, admission or related services, or equivalent services.     EXPECTED DISCHARGE DATE/DAY: TBD     DISPOSITION: Home       Signed By:   Lashonda Carranza MD  2/28/18

## 2018-03-03 PROCEDURE — 65220000003 HC RM SEMIPRIVATE PSYCH

## 2018-03-03 PROCEDURE — 74011250637 HC RX REV CODE- 250/637: Performed by: PSYCHIATRY & NEUROLOGY

## 2018-03-03 PROCEDURE — 74011250637 HC RX REV CODE- 250/637: Performed by: HOSPITALIST

## 2018-03-03 RX ADMIN — LISINOPRIL 20 MG: 20 TABLET ORAL at 08:58

## 2018-03-03 RX ADMIN — PANTOPRAZOLE SODIUM 40 MG: 40 TABLET, DELAYED RELEASE ORAL at 06:45

## 2018-03-03 RX ADMIN — RISPERIDONE 4 MG: 3 TABLET ORAL at 21:06

## 2018-03-03 NOTE — INTERDISCIPLINARY ROUNDS
Behavioral Health Interdisciplinary Rounds     Patient Name: Gerardo Howell  Age: 65 y.o. Room/Bed:  4/  Primary Diagnosis: Psychotic disorder   Admission Status: Involuntary Commitment     Readmission within 30 days: no  Power of  in place: no  Patient requires a blocked bed: no          Reason for blocked bed: n/a    VTE Prophylaxis: No  Flu vaccine given : yes, PTA  Mobility needs/Fall risk: no    Nutritional Plan: no  Consults:          Labs/Testing due today?: no    Sleep hours:  6 1/2 hrs      Participation in Care/Groups:   Attends some groups  Medication Compliant?: Yes  PRNS (last 24 hours): None    Restraints (last 24 hours):  no  Substance Abuse:  no  CIWA (range last 24 hours):  COWS (range last 24 hours):   Alcohol screening (AUDIT) completed -  AUDIT Score: 1  If applicable, date SBIRT discussed in treatment team AND documented:   Tobacco - patient is a smoker: no   Date tobacco education completed by RN: 2/27/2018  24 hour chart check complete: yes     Patient goal(s) for today:   Treatment team focus/goals:   Progress note     LOS:  6  Expected LOS:     Financial concerns/prescription coverage:    Date of last family contact:       Family requesting physician contact today:    Discharge plan:   Guns in the home:         Outpatient provider(s):     Participating treatment team members: Gerardo Howell, * (assigned SW),

## 2018-03-03 NOTE — PROGRESS NOTES
Problem: Anxiety-Behavioral Health (Adult/Pediatric)  Goal: *STG: Participates in treatment plan  Outcome: Progressing Towards Goal  Patient denies SI. Med and meal compliant. Cooperative.

## 2018-03-03 NOTE — BH NOTES
PSYCHIATRIC PROGRESS NOTE         Patient Name  Justin Parmar   Date of Birth 1955   [facility] [id number]   Medical Record Number  [id number]      Age  65 y.o. PCP Bianca Sanchez, MD   Admit date:  2/25/2018    Room Number  734/01  @ [facility]   Date of Service  3/1/18          PSYCHOTHERAPY SESSION NOTE:  Length of psychotherapy session: 20 minutes    Main condition/diagnosis/issues treated during session today, 3/3/2018 : delusional    I employed Cognitive Behavioral therapy techniques, Reality-Oriented psychotherapy, as well as supportive psychotherapy in regards to various ongoing psychosocial stressors, including the following: pre-admission and current problems; housing issues; occupational issues; academic issues; legal issues; medical issues; and stress of hospitalization. Interpersonal relationship issues and psychodynamic conflicts explored. Attempts made to alleviate maladaptive patterns. We, also, worked on issues of denial & effects of substance dependency/use     Overall, patient is not progressing    Treatment Plan Update (reviewed an updated 3/3/2018) : I will modify psychotherapy tx plan by implementing more stress management strategies, building upon cognitive behavioral techniques, increasing coping skills, as well as shoring up psychological defenses). E & M PROGRESS NOTE:         HISTORY       CC:  \"I\"m fine\"  HISTORY OF PRESENT ILLNESS/INTERVAL HISTORY:  (reviewed/updated 3/3/2018). per initial evaluation:   The patient, Justin Parmar, is a 65 y.o.  OTHER female with unknown psychiatric history, who presents at this time with complaints of (and/or evidence of) the following emotional symptoms: psychotic behavior. Additional symptomatology include bizarre and impulsive behavior. The patient moved to the [address] [place] [address],[address] from \[facility]", two years ago based on message from Zach 1827.  She came with a [employer] and she describes 'fleeing' from her  and his family. 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 She left [facility] due to fear of the [place] trying to kill her. She has been staying at the [facility] in [place] because she was looking for a safe place to stay, pray to God. The staff called 911 due to her paranoia, stealing residents blankets, poor sleep, etc. Since admission, she has expressed fear of color Red which she associates with the Devil. The above symptoms have been present for several weeks, possibly chronic. These symptoms are of moderate to high severity. These symptoms are constant in nature. Leamon Simmonds presents/reports/evidences the following emotional symptoms today, 3/3/2018:delusions and psychotic behavior. The above symptoms have been present for unknown length of time, possibly months to years. These symptoms are of high severity. The symptoms are constant  in nature. Additional symptomatology and features include continued Baptist delusions, poor insight. Mood stable. Improved sleep over night. Remains guarded, delusional and circumstantial but redirect able. No prn's needed. SIDE EFFECTS: (reviewed/updated 3/3/2018)  None reported or admitted to. ALLERGIES:(reviewed/updated 3/3/2018)  No Known Allergies   MEDICATIONS PRIOR TO ADMISSION:(reviewed/updated 3/3/2018)  Prescriptions Prior to Admission   Medication Sig    lisinopril (PRINIVIL, ZESTRIL) 20 mg tablet Take 20 mg by mouth daily.  omeprazole (PRILOSEC) 20 mg capsule Take 20 mg by mouth three (3) times daily. PAST MEDICAL HISTORY: Past medical history from the initial psychiatric evaluation has been reviewed (reviewed/updated 3/3/2018) with no additional updates (I asked patient and no additional past medical history provided).    Past Medical History:   Diagnosis Date    Hypertension     Ill-defined condition     GERD   No past surgical history on file. SOCIAL HISTORY: Social history from the initial psychiatric evaluation has been reviewed (reviewed/updated 3/3/2018) with no additional updates (I asked patient and no additional social history provided). Social History     Social History    Marital status: LEGALLY      Spouse name: N/A    Number of children: N/A    Years of education: N/A     Occupational History    Not on file. Social History Main Topics    Smoking status: Never Smoker    Smokeless tobacco: Never Used    Alcohol use No    Drug use: Not on file    Sexual activity: Not on file     Other Topics Concern    Not on file     Social History Narrative      FAMILY HISTORY: Family history from the initial psychiatric evaluation has been reviewed (reviewed/updated 3/3/2018) with no additional updates (I asked patient and no additional family history provided). No family history on file. REVIEW OF SYSTEMS: (reviewed/updated 3/3/2018)  Appetite:good   Sleep: good   All other Review of Systems: Negative except delusions, bizarre behavior         [facility] ([facility]):    [facility] FINDINGS ARE WITHIN NORMAL LIMITS (WNL) UNLESS OTHERWISE STATED BELOW. ( ALL OF THE BELOW CATEGORIES OF THE [facility] HAVE BEEN REVIEWED (reviewed 3/3/2018) AND UPDATED AS DEEMED APPROPRIATE )  General Presentation age appropriate and casually dressed, cooperative   Orientation oriented to time, place and person   Vital Signs  See below (reviewed 3/3/2018); Vital Signs (BP, Pulse, & Temp) are within normal limits if not listed below.    Gait and Station Stable/steady, no ataxia   Musculoskeletal System No extrapyramidal symptoms (EPS); no abnormal muscular movements or Tardive Dyskinesia (TD); muscle strength and tone are within normal limits   Language No aphasia or dysarthria   Speech:  normal pitch and normal volume   Thought Processes normal rate of thoughts; irrational thinking; Illogical thoughts   Thought Associations goal directed   Thought Content (+)delusional- Mu-ism, bizarre   Suicidal Ideations none   Homicidal Ideations none   Mood:  euthymic   Affect:  mood-congruent   Memory recent  good   Memory remote:  good   Concentration/Attention:  wnl   Fund of Knowledge wnl   Insight:  poor   Reliability poor   Judgment:  poor          VITALS:     Patient Vitals for the past 24 hrs:   Temp Pulse Resp BP SpO2   03/03/18 1655 98.7 °F (37.1 °C) 83 18 (!) 176/96 98 %   03/03/18 0850 98.5 °F (36.9 °C) 70 16 115/77 100 %   03/02/18 1950 98.5 °F (36.9 °C) 69 16 137/83 -     Wt Readings from Last 3 Encounters:   03/03/18 75.8 kg (167 lb 1.6 oz)   02/25/18 77.1 kg (170 lb)     Temp Readings from Last 3 Encounters:   03/03/18 98.7 °F (37.1 °C)   02/25/18 98.6 °F (37 °C)     BP Readings from Last 3 Encounters:   03/03/18 (!) 176/96   02/25/18 114/70     Pulse Readings from Last 3 Encounters:   03/03/18 83   02/25/18 78            DATA     LABORATORY DATA:(reviewed/updated 3/3/2018)  No results found for this or any previous visit (from the past 24 hour(s)). No results found for: VALF2, VALAC, VALP, VALPR, DS6, CRBAM, CRBAMP, CARB2, XCRBAM  No results found for: LITHM   RADIOLOGY REPORTS:(reviewed/updated 3/3/2018)  Ct Head Wo Cont    Result Date: 2/25/2018  EXAM:  CT HEAD WO CONT INDICATION:   Confusion/delirium, altered LOC, unexplained COMPARISON: None. CONTRAST:  None. TECHNIQUE: Unenhanced CT of the head was performed using 5 mm images. Brain and bone windows were generated. CT dose reduction was achieved through use of a standardized protocol tailored for this examination and automatic exposure control for dose modulation. FINDINGS: The ventricles and sulci are normal in size, shape and configuration and midline. There is periventricular hypoattenuation. There is no intracranial hemorrhage, extra-axial collection, mass, mass effect or midline shift. The basilar cisterns are open. No acute infarct is identified.  The bone windows demonstrate no abnormalities. The visualized portions of the paranasal sinuses and mastoid air cells are clear. IMPRESSION: No acute findings.           MEDICATIONS     ALL MEDICATIONS:   Current Facility-Administered Medications   Medication Dose Route Frequency    risperiDONE (RisperDAL) tablet 4 mg  4 mg Oral QHS    lisinopril (PRINIVIL, ZESTRIL) tablet 20 mg  20 mg Oral DAILY    pantoprazole (PROTONIX) tablet 40 mg  40 mg Oral ACB    ziprasidone (GEODON) 20 mg in sterile water (preservative free) 1 mL injection  20 mg IntraMUSCular BID PRN    OLANZapine (ZyPREXA) tablet 5 mg  5 mg Oral Q6H PRN    benztropine (COGENTIN) tablet 2 mg  2 mg Oral BID PRN    benztropine (COGENTIN) injection 2 mg  2 mg IntraMUSCular BID PRN    LORazepam (ATIVAN) injection 2 mg  2 mg IntraMUSCular Q4H PRN    LORazepam (ATIVAN) tablet 1 mg  1 mg Oral Q4H PRN    zolpidem (AMBIEN) tablet 5 mg  5 mg Oral QHS PRN    acetaminophen (TYLENOL) tablet 650 mg  650 mg Oral Q4H PRN    ibuprofen (MOTRIN) tablet 400 mg  400 mg Oral Q8H PRN    magnesium hydroxide (MILK OF MAGNESIA) 400 mg/5 mL oral suspension 30 mL  30 mL Oral DAILY PRN    nicotine (NICODERM CQ) 21 mg/24 hr patch 1 Patch  1 Patch TransDERmal DAILY PRN      SCHEDULED MEDICATIONS:   Current Facility-Administered Medications   Medication Dose Route Frequency    risperiDONE (RisperDAL) tablet 4 mg  4 mg Oral QHS    lisinopril (PRINIVIL, ZESTRIL) tablet 20 mg  20 mg Oral DAILY    pantoprazole (PROTONIX) tablet 40 mg  40 mg Oral ACB          ASSESSMENT & PLAN     DIAGNOSES REQUIRING ACTIVE TREATMENT AND MONITORING: (reviewed/updated 3/3/2018)  Patient Active Hospital Problem List:   Psychotic disorder (2/25/2018)    Assessment: severe; schizophrenia most likely diagnosis without evidence of mood component    Plan: Agree with inpatient hospitalization for further stabilization, safety monitoring and medication management  Medications- risperdal 4mg at night   consult      In summary, Justin Parmar, is a 65 y.o.  female who presents with a severe exacerbation of the principal diagnosis of Psychotic disorder  Patient's condition is improving. Patient requires continued inpatient hospitalization for further stabilization, safety monitoring and medication management. I will continue to coordinate the provision of individual, milieu, occupational, group, and substance abuse therapies to address target symptoms/diagnoses as deemed appropriate for the individual patient. A coordinated, multidisplinary treatment team round was conducted with the patient (this team consists of the nurse, psychiatric unit pharmcist,  and writer). Complete current electronic health record for patient has been reviewed today including consultant notes, ancillary staff notes, nurses and psychiatric tech notes. Suicide risk assessment completed and patient deemed to be of low risk for suicide at this time. The following regarding medications was addressed during rounds with patient:   the risks and benefits of the proposed medication. The patient was given the opportunity to ask questions. Informed consent given to the use of the above medications. Will continue to adjust psychiatric and non-psychiatric medications (see above \"medication\" section and orders section for details) as deemed appropriate & based upon diagnoses and response to treatment. I will continue to order blood tests/labs and diagnostic tests as deemed appropriate and review results as they become available (see orders for details and above listed lab/test results). I will order psychiatric records from previous [facility] hospitals to further elucidate the nature of patient's psychopathology and review once available.     I will gather additional collateral information from friends, family and o/p treatment team to further elucidate the nature of patient's psychopathology and [facility] level of psychiatric functioning. I certify that this patient's inpatient psychiatric hospital services furnished since the previous certification were, and continue to be, required for treatment that could reasonably be expected to improve the patient's condition, or for diagnostic study, and that the patient continues to need, on a daily basis, active treatment furnished directly by or requiring the supervision of inpatient psychiatric facility personnel. In addition, the hospital records show that services furnished were intensive treatment services, admission or related services, or equivalent services.     EXPECTED DISCHARGE DATE/DAY: TBD     DISPOSITION: Home       Signed By:   Lisa Carmichael MD  2/28/18

## 2018-03-03 NOTE — PROGRESS NOTES
Problem: Psychosis  Goal: *STG: Remains safe in hospital  Outcome: Progressing Towards Goal  Pt denies any suicidal or homicidal thoughts. Contracts for safety. Remains on q 15 min safety checks. Problem: Anxiety-Behavioral Health (Adult/Pediatric)  Goal: *STG: Participates in treatment plan  Outcome: Progressing Towards Goal  Pt participates in her care. Attends groups. Compliant with meds. Interactive with peers. No complaints voiced. Continues to have some anxiety and delusional thoughts. Problem: Falls - Risk of  Goal: *Absence of Falls  Document Denae Fall Risk and appropriate interventions in the flowsheet. Outcome: Progressing Towards Goal  Fall Risk Interventions:            Medication Interventions: Teach patient to arise slowly         History of Falls Interventions: Room close to nurse's station        0953- Pt observed standing in the milieu reading her bible most of the morning. Pt appears withdrawn. Pt answers questions when asked. Pt's attending group.

## 2018-03-03 NOTE — BH NOTES
GROUP THERAPY PROGRESS NOTE    Suzie Perla is participating in D/C Planning Group    Group time: 45 minutes    Personal goal for participation: Readiness for D/C    Goal orientation: Barriers to Recovery    Group therapy participation: passive    Therapeutic interventions reviewed and discussed:  Yes    Impression of participation: Pt was in attendance but seemed per- occupied with intense coloring. US and certain peers attempted to engage her but she did not respond.

## 2018-03-03 NOTE — BH NOTES
PSYCHIATRIC PROGRESS NOTE         Patient Name  Charu Rodriguez   Date of Birth 1955   [facility] [id number]   Medical Record Number  [id number]      Age  65 y.o. PCP Bianca Sanchez, MD   Admit date:  2/25/2018    Room Number  734/01  @ [facility]   Date of Service  3/1/18          PSYCHOTHERAPY SESSION NOTE:  Length of psychotherapy session: 20 minutes    Main condition/diagnosis/issues treated during session today, 3/2/2018 : delusional    I employed Cognitive Behavioral therapy techniques, Reality-Oriented psychotherapy, as well as supportive psychotherapy in regards to various ongoing psychosocial stressors, including the following: pre-admission and current problems; housing issues; occupational issues; academic issues; legal issues; medical issues; and stress of hospitalization. Interpersonal relationship issues and psychodynamic conflicts explored. Attempts made to alleviate maladaptive patterns. We, also, worked on issues of denial & effects of substance dependency/use     Overall, patient is not progressing    Treatment Plan Update (reviewed an updated 3/2/2018) : I will modify psychotherapy tx plan by implementing more stress management strategies, building upon cognitive behavioral techniques, increasing coping skills, as well as shoring up psychological defenses). E & M PROGRESS NOTE:         HISTORY       CC:  \"I\"m fine\"  HISTORY OF PRESENT ILLNESS/INTERVAL HISTORY:  (reviewed/updated 3/2/2018). per initial evaluation:   The patient, Charu Rodriguez, is a 65 y.o.  OTHER female with unknown psychiatric history, who presents at this time with complaints of (and/or evidence of) the following emotional symptoms: psychotic behavior. Additional symptomatology include bizarre and impulsive behavior. The patient moved to the [address] [place] [address] from hospitals, two years ago based on message from Zach 1827.  She came with a [employer] and she describes 'fleeing' from her  and his family. 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 She left [facility] due to fear of the [place] trying to kill her. She has been staying at the [facility] in [place] because she was looking for a safe place to stay, pray to God. The staff called 911 due to her paranoia, stealing residents blankets, poor sleep, etc. Since admission, she has expressed fear of color Red which she associates with the Devil. The above symptoms have been present for several weeks, possibly chronic. These symptoms are of moderate to high severity. These symptoms are constant in nature. Amira Liriano presents/reports/evidences the following emotional symptoms today, 3/2/2018:delusions and psychotic behavior. The above symptoms have been present for unknown length of time, possibly months to years. These symptoms are of high severity. The symptoms are constant  in nature. Additional symptomatology and features include continued Protestant delusions, poor insight. Mood stable. Improved sleep over night. Daughter has called to speak with patient, Team attempted to call      SIDE EFFECTS: (reviewed/updated 3/2/2018)  None reported or admitted to. ALLERGIES:(reviewed/updated 3/2/2018)  No Known Allergies   MEDICATIONS PRIOR TO ADMISSION:(reviewed/updated 3/2/2018)  Prescriptions Prior to Admission   Medication Sig    lisinopril (PRINIVIL, ZESTRIL) 20 mg tablet Take 20 mg by mouth daily.  omeprazole (PRILOSEC) 20 mg capsule Take 20 mg by mouth three (3) times daily. PAST MEDICAL HISTORY: Past medical history from the initial psychiatric evaluation has been reviewed (reviewed/updated 3/2/2018) with no additional updates (I asked patient and no additional past medical history provided). Past Medical History:   Diagnosis Date    Hypertension     Ill-defined condition     GERD   No past surgical history on file. SOCIAL HISTORY: Social history from the initial psychiatric evaluation has been reviewed (reviewed/updated 3/2/2018) with no additional updates (I asked patient and no additional social history provided). Social History     Social History    Marital status: LEGALLY      Spouse name: N/A    Number of children: N/A    Years of education: N/A     Occupational History    Not on file. Social History Main Topics    Smoking status: Never Smoker    Smokeless tobacco: Never Used    Alcohol use No    Drug use: Not on file    Sexual activity: Not on file     Other Topics Concern    Not on file     Social History Narrative      FAMILY HISTORY: Family history from the initial psychiatric evaluation has been reviewed (reviewed/updated 3/2/2018) with no additional updates (I asked patient and no additional family history provided). No family history on file. REVIEW OF SYSTEMS: (reviewed/updated 3/2/2018)  Appetite:good   Sleep: good   All other Review of Systems: Negative except delusions, bizarre behavior         [facility] (MSE):    MSE FINDINGS ARE WITHIN NORMAL LIMITS (WNL) UNLESS OTHERWISE STATED BELOW. ( ALL OF THE BELOW CATEGORIES OF THE MSE HAVE BEEN REVIEWED (reviewed 3/2/2018) AND UPDATED AS DEEMED APPROPRIATE )  General Presentation age appropriate and casually dressed, cooperative   Orientation oriented to time, place and person   Vital Signs  See below (reviewed 3/2/2018); Vital Signs (BP, Pulse, & Temp) are within normal limits if not listed below.    Gait and Station Stable/steady, no ataxia   Musculoskeletal System No extrapyramidal symptoms (EPS); no abnormal muscular movements or Tardive Dyskinesia (TD); muscle strength and tone are within normal limits   Language No aphasia or dysarthria   Speech:  normal pitch and normal volume   Thought Processes normal rate of thoughts; irrational thinking; Illogical thoughts   Thought Associations goal directed   Thought Content (+)delusional- Rastafari, bizarre   Suicidal Ideations none   Homicidal Ideations none   Mood:  euthymic   Affect:  mood-congruent   Memory recent  good   Memory remote:  good   Concentration/Attention:  wnl   Fund of Knowledge wnl   Insight:  poor   Reliability poor   Judgment:  poor          VITALS:     Patient Vitals for the past 24 hrs:   Temp Pulse Resp BP SpO2   03/02/18 1554 98.5 °F (36.9 °C) 75 18 132/81 99 %   03/02/18 0800 98.3 °F (36.8 °C) 78 16 123/83 99 %     Wt Readings from Last 3 Encounters:   02/27/18 77.1 kg (169 lb 15.6 oz)   02/25/18 77.1 kg (170 lb)     Temp Readings from Last 3 Encounters:   03/02/18 98.5 °F (36.9 °C)   02/25/18 98.6 °F (37 °C)     BP Readings from Last 3 Encounters:   03/02/18 132/81   02/25/18 114/70     Pulse Readings from Last 3 Encounters:   03/02/18 75   02/25/18 78            DATA     LABORATORY DATA:(reviewed/updated 3/2/2018)  No results found for this or any previous visit (from the past 24 hour(s)). No results found for: VALF2, VALAC, VALP, VALPR, DS6, CRBAM, CRBAMP, CARB2, XCRBAM  No results found for: LITHM   RADIOLOGY REPORTS:(reviewed/updated 3/2/2018)  Ct Head Wo Cont    Result Date: 2/25/2018  EXAM:  CT HEAD WO CONT INDICATION:   Confusion/delirium, altered LOC, unexplained COMPARISON: None. CONTRAST:  None. TECHNIQUE: Unenhanced CT of the head was performed using 5 mm images. Brain and bone windows were generated. CT dose reduction was achieved through use of a standardized protocol tailored for this examination and automatic exposure control for dose modulation. FINDINGS: The ventricles and sulci are normal in size, shape and configuration and midline. There is periventricular hypoattenuation. There is no intracranial hemorrhage, extra-axial collection, mass, mass effect or midline shift. The basilar cisterns are open. No acute infarct is identified. The bone windows demonstrate no abnormalities.  The visualized portions of the paranasal sinuses and mastoid air cells are clear. IMPRESSION: No acute findings. MEDICATIONS     ALL MEDICATIONS:   Current Facility-Administered Medications   Medication Dose Route Frequency    risperiDONE (RisperDAL) tablet 4 mg  4 mg Oral QHS    lisinopril (PRINIVIL, ZESTRIL) tablet 20 mg  20 mg Oral DAILY    pantoprazole (PROTONIX) tablet 40 mg  40 mg Oral ACB    ziprasidone (GEODON) 20 mg in sterile water (preservative free) 1 mL injection  20 mg IntraMUSCular BID PRN    OLANZapine (ZyPREXA) tablet 5 mg  5 mg Oral Q6H PRN    benztropine (COGENTIN) tablet 2 mg  2 mg Oral BID PRN    benztropine (COGENTIN) injection 2 mg  2 mg IntraMUSCular BID PRN    LORazepam (ATIVAN) injection 2 mg  2 mg IntraMUSCular Q4H PRN    LORazepam (ATIVAN) tablet 1 mg  1 mg Oral Q4H PRN    zolpidem (AMBIEN) tablet 5 mg  5 mg Oral QHS PRN    acetaminophen (TYLENOL) tablet 650 mg  650 mg Oral Q4H PRN    ibuprofen (MOTRIN) tablet 400 mg  400 mg Oral Q8H PRN    magnesium hydroxide (MILK OF MAGNESIA) 400 mg/5 mL oral suspension 30 mL  30 mL Oral DAILY PRN    nicotine (NICODERM CQ) 21 mg/24 hr patch 1 Patch  1 Patch TransDERmal DAILY PRN      SCHEDULED MEDICATIONS:   Current Facility-Administered Medications   Medication Dose Route Frequency    risperiDONE (RisperDAL) tablet 4 mg  4 mg Oral QHS    lisinopril (PRINIVIL, ZESTRIL) tablet 20 mg  20 mg Oral DAILY    pantoprazole (PROTONIX) tablet 40 mg  40 mg Oral ACB          ASSESSMENT & PLAN     DIAGNOSES REQUIRING ACTIVE TREATMENT AND MONITORING: (reviewed/updated 3/2/2018)  Patient Active Hospital Problem List:   Psychotic disorder (2/25/2018)    Assessment: severe; schizophrenia most likely diagnosis without evidence of mood component    Plan: Agree with inpatient hospitalization for further stabilization, safety monitoring and medication management  Medications- risperdal 4mg at night   consult      In summary, Austen Mandel, is a 65 y.o. female who presents with a severe exacerbation of the principal diagnosis of Psychotic disorder  Patient's condition is improving. Patient requires continued inpatient hospitalization for further stabilization, safety monitoring and medication management. I will continue to coordinate the provision of individual, milieu, occupational, group, and substance abuse therapies to address target symptoms/diagnoses as deemed appropriate for the individual patient. A coordinated, multidisplinary treatment team round was conducted with the patient (this team consists of the nurse, psychiatric unit pharmcist,  and writer). Complete current electronic health record for patient has been reviewed today including consultant notes, ancillary staff notes, nurses and psychiatric tech notes. Suicide risk assessment completed and patient deemed to be of low risk for suicide at this time. The following regarding medications was addressed during rounds with patient:   the risks and benefits of the proposed medication. The patient was given the opportunity to ask questions. Informed consent given to the use of the above medications. Will continue to adjust psychiatric and non-psychiatric medications (see above \"medication\" section and orders section for details) as deemed appropriate & based upon diagnoses and response to treatment. I will continue to order blood tests/labs and diagnostic tests as deemed appropriate and review results as they become available (see orders for details and above listed lab/test results). I will order psychiatric records from previous [facility] hospitals to further elucidate the nature of patient's psychopathology and review once available. I will gather additional collateral information from friends, family and o/p treatment team to further elucidate the nature of patient's psychopathology and baselline level of psychiatric functioning.          I certify that this patient's inpatient psychiatric hospital services furnished since the previous certification were, and continue to be, required for treatment that could reasonably be expected to improve the patient's condition, or for diagnostic study, and that the patient continues to need, on a daily basis, active treatment furnished directly by or requiring the supervision of inpatient psychiatric facility personnel. In addition, the hospital records show that services furnished were intensive treatment services, admission or related services, or equivalent services.     EXPECTED DISCHARGE DATE/DAY: TBD     DISPOSITION: Home       Signed By:   Ethan Dawson MD  2/28/18

## 2018-03-03 NOTE — BH NOTES
GROUP THERAPY PROGRESS NOTE    Chetna Koehler is participating in [place].      Group time: 20 minutes    Personal goal for participation: read bible and meditate    Goal orientation: community    Group therapy participation: active    Therapeutic interventions reviewed and discussed: yes    Impression of participation: minimal

## 2018-03-04 PROCEDURE — 74011250637 HC RX REV CODE- 250/637: Performed by: PSYCHIATRY & NEUROLOGY

## 2018-03-04 PROCEDURE — 65220000003 HC RM SEMIPRIVATE PSYCH

## 2018-03-04 PROCEDURE — 74011250637 HC RX REV CODE- 250/637: Performed by: HOSPITALIST

## 2018-03-04 RX ORDER — BENZTROPINE MESYLATE 1 MG/1
1 TABLET ORAL 2 TIMES DAILY
Status: DISCONTINUED | OUTPATIENT
Start: 2018-03-04 | End: 2018-03-06 | Stop reason: HOSPADM

## 2018-03-04 RX ADMIN — RISPERIDONE 4 MG: 3 TABLET ORAL at 21:14

## 2018-03-04 RX ADMIN — BENZTROPINE MESYLATE 1 MG: 1 TABLET ORAL at 17:39

## 2018-03-04 RX ADMIN — LISINOPRIL 20 MG: 20 TABLET ORAL at 09:06

## 2018-03-04 RX ADMIN — PANTOPRAZOLE SODIUM 40 MG: 40 TABLET, DELAYED RELEASE ORAL at 06:38

## 2018-03-04 NOTE — BH NOTES
GROUP THERAPY PROGRESS NOTE    Gurinder Azevedo is participating in [place].      Group time: 20 minutes    Personal goal for participation: read bible    Goal orientation: community    Group therapy participation: minimal    Therapeutic interventions reviewed and discussed: yes    Impression of participation: minimal

## 2018-03-04 NOTE — PROGRESS NOTES
Problem: Anxiety-Behavioral Health (Adult/Pediatric)  Goal: *STG: Participates in treatment plan  Outcome: Progressing Towards Goal  Denies SI. Med and meal compliant.

## 2018-03-04 NOTE — PROGRESS NOTES
Problem: Anxiety-Behavioral Health (Adult/Pediatric)  Goal: *STG: Remains safe in hospital  Outcome: Progressing Towards Goal  2300- Received client resting quietly in bed with eyes closed. Respirations even and unlabored, NAD.  Will continue to monitor for safety

## 2018-03-04 NOTE — INTERDISCIPLINARY ROUNDS
Behavioral Health Interdisciplinary Rounds     Patient Name: Suha Kitchen  Age: 65 y.o.   Room/Bed:  734/  Primary Diagnosis: Psychotic disorder   Admission Status: Involuntary Commitment     Readmission within 30 days: no  Power of  in place: no  Patient requires a blocked bed: no          Reason for blocked bed:     VTE Prophylaxis: No  Flu vaccine given : yes   Mobility needs/Fall risk: no    Nutritional Plan: no  Consults:          Labs/Testing due today?: no    Sleep hours:  7.45      Participation in Care/Groups:  yes  Medication Compliant?: Yes  PRNS (last 24 hours): None    Restraints (last 24 hours):  no  Substance Abuse:  no  CIWA (range last 24 hours):  COWS (range last 24 hours):   Alcohol screening (AUDIT) completed -  AUDIT Score: 1  If applicable, date SBIRT discussed in treatment team AND documented:   Tobacco - patient is a smoker: no   Date tobacco education completed by RN:   24 hour chart check complete: yes     Patient goal(s) for today:   Treatment team focus/goals:   Progress note     LOS:  7  Expected LOS:     Financial concerns/prescription coverage:  no  Date of last family contact:       Family requesting physician contact today:  no  Discharge plan:   Guns in the home: no        Outpatient provider(s):    Participating treatment team members: Suha Kithcen

## 2018-03-04 NOTE — PROGRESS NOTES
Problem: Psychosis 3/8/18  Goal: *STG: Remains safe in hospital  Outcome: Progressing Towards Goal  Pt denies any suicidal or homicidal thoughts. Contracts for safety. Remains on q 15 min safety checks. Goal: *STG: Seeks staff when feelings of self harm or harm towards others arise  Outcome: Progressing Towards Goal  Has had appropriate interaction with staff and has been less intrusive with others. .  Goal: *STG/LTG: Complies with medication therapy  Outcome: Progressing Towards Goal  Has been medication compliant and verbalizes understanding of medication. Problem: *Psychosis: Discharge Outcome  Goal: *Absent or Controlled Active Psychotic Symptoms  Outcome: Progressing Towards Goal  Denies AH/VH. Corrinne Padroni Thought process has improved but some disorganization and delusional content persist.    Problem: Anxiety-Behavioral Health (Adult/Pediatric)  Goal: *STG: Attends activities and groups  Outcome: Progressing Towards Goal  Group attendance rate and participation persist.    [address] [facility]  Master Treatment Plan Dayanna Yanet        Date Treatment Plan Initiated: 3/4/2018      Treatment Plan Modalities:    Type of Modality Amount  (x minutes) Frequency (x/week) Duration (x days) Name of Responsible Staff   Community & wrap-up meetings to encourage peer interactions    15    7    1     I Vicky SHEN   Group psychotherapy to assist in building coping skills and internal controls    60    7    1    Kevin Wyatt LCSW   Therapeutic activity groups to build coping skills    60    7    1    Kevin Wyatt LCSW   Psychoeducation in group setting to address:   Medication education    15    7    1    BRIDGET Kern RN   Coping skills    20    7    1    Lalitha Barahona RN   Relaxation techniques           Symptom management           Discharge planning    15    7    1    DM Crocker    Spirituality     60    7    1    vincent Dao    60    7    1    Volunteer from [employer] [facility]    60    7    1    Volunteer from [address] [place] [address] medication management    15    7    1    Dr. Prvain Santa meeting/discharge planning

## 2018-03-04 NOTE — PROGRESS NOTES
Problem: Tobacco Use  Goal: *Tobacco use abstinence  Outcome: Resolved/Met Date Met: 03/04/18  Patient stated never a smoker

## 2018-03-05 PROCEDURE — 65220000003 HC RM SEMIPRIVATE PSYCH

## 2018-03-05 PROCEDURE — 74011250637 HC RX REV CODE- 250/637: Performed by: HOSPITALIST

## 2018-03-05 PROCEDURE — 74011250637 HC RX REV CODE- 250/637: Performed by: PSYCHIATRY & NEUROLOGY

## 2018-03-05 PROCEDURE — 74011000250 HC RX REV CODE- 250: Performed by: PSYCHIATRY & NEUROLOGY

## 2018-03-05 RX ORDER — POLYVINYL ALCOHOL 14 MG/ML
2 SOLUTION/ DROPS OPHTHALMIC AS NEEDED
Status: DISCONTINUED | OUTPATIENT
Start: 2018-03-05 | End: 2018-03-06 | Stop reason: HOSPADM

## 2018-03-05 RX ORDER — FLUTICASONE PROPIONATE 50 MCG
2 SPRAY, SUSPENSION (ML) NASAL DAILY
COMMUNITY
End: 2019-01-25

## 2018-03-05 RX ORDER — PREDNISONE 20 MG/1
20 TABLET ORAL DAILY
COMMUNITY
End: 2018-03-06

## 2018-03-05 RX ADMIN — POLYVINYL ALCOHOL 2 DROP: 14 SOLUTION/ DROPS OPHTHALMIC at 14:10

## 2018-03-05 RX ADMIN — BENZTROPINE MESYLATE 1 MG: 1 TABLET ORAL at 09:16

## 2018-03-05 RX ADMIN — POLYVINYL ALCOHOL 2 DROP: 14 SOLUTION/ DROPS OPHTHALMIC at 17:12

## 2018-03-05 RX ADMIN — RISPERIDONE 4 MG: 3 TABLET ORAL at 21:17

## 2018-03-05 RX ADMIN — PANTOPRAZOLE SODIUM 40 MG: 40 TABLET, DELAYED RELEASE ORAL at 06:36

## 2018-03-05 RX ADMIN — LISINOPRIL 20 MG: 20 TABLET ORAL at 09:16

## 2018-03-05 RX ADMIN — BENZTROPINE MESYLATE 1 MG: 1 TABLET ORAL at 17:11

## 2018-03-05 RX ADMIN — POLYVINYL ALCOHOL 2 DROP: 14 SOLUTION/ DROPS OPHTHALMIC at 21:18

## 2018-03-05 NOTE — INTERDISCIPLINARY ROUNDS
Behavioral Health Interdisciplinary Rounds     Patient Name: Amanda Baxter  Age: 65 y.o. Room/Bed:  734/  Primary Diagnosis: Psychotic disorder   Admission Status: Involuntary Commitment     Readmission within 30 days: no  Power of  in place: no  Patient requires a blocked bed: no          Reason for blocked bed: n/a    VTE Prophylaxis: No  Flu vaccine given : yes, PTA  Mobility needs/Fall risk: no    Nutritional Plan: no  Consults:          Labs/Testing due today?: no    Sleep hours:  6 1/2 hrs      Participation in Care/Groups:  yes  Medication Compliant?: Yes  PRNS (last 24 hours): None    Restraints (last 24 hours):  no  Substance Abuse:  no  CIWA (range last 24 hours):  COWS (range last 24 hours):   Alcohol screening (AUDIT) completed -  AUDIT Score: 1  If applicable, date SBIRT discussed in treatment team AND documented:   Tobacco - patient is a smoker: no     Date tobacco education completed by RN:   2/27/2018  24 hour chart check complete:  Yes    Patient goal(s) for today:   Treatment team focus/goals: Plan to titrate her medicaitons and try to call her daughter in [place]. Progress note : She has been doing well on the unit. She is complaint with hier medications and treatment. LOS:  8  Expected LOS: TBD     Financial concerns/prescription coverage:  [employer]   Date of last family contact:  Family call the unit this weekend.        Family requesting physician contact today:    Discharge plan: She is wanting to return to [address] in the home:  no       Outpatient provider(s): TBD  Participating treatment team members: Ronni Brown Dr.

## 2018-03-05 NOTE — BH NOTES
PSYCHIATRIC PROGRESS NOTE         Patient Name  Forest Aparicio   Date of Birth 1955   [facility] [id number]   Medical Record Number  [id number]      Age  65 y.o. PCP Bianca Sanchez, MD   Admit date:  2/25/2018    Room Number  734/01  @ [facility]   Date of Service  3/1/18          PSYCHOTHERAPY SESSION NOTE:  Length of psychotherapy session: 20 minutes    Main condition/diagnosis/issues treated during session today, 3/5/2018 : delusional    I employed Cognitive Behavioral therapy techniques, Reality-Oriented psychotherapy, as well as supportive psychotherapy in regards to various ongoing psychosocial stressors, including the following: pre-admission and current problems; housing issues; occupational issues; academic issues; legal issues; medical issues; and stress of hospitalization. Interpersonal relationship issues and psychodynamic conflicts explored. Attempts made to alleviate maladaptive patterns. We, also, worked on issues of denial & effects of substance dependency/use     Overall, patient is not progressing    Treatment Plan Update (reviewed an updated 3/5/2018) : I will modify psychotherapy tx plan by implementing more stress management strategies, building upon cognitive behavioral techniques, increasing coping skills, as well as shoring up psychological defenses). E & M PROGRESS NOTE:         HISTORY       CC:  \"I\"m fine\"  HISTORY OF PRESENT ILLNESS/INTERVAL HISTORY:  (reviewed/updated 3/5/2018). per initial evaluation:   The patient, Forest Aparicio, is a 65 y.o.  OTHER female with unknown psychiatric history, who presents at this time with complaints of (and/or evidence of) the following emotional symptoms: psychotic behavior. Additional symptomatology include bizarre and impulsive behavior. The patient moved to the [address] [place] [address],[address] from [facility], two years ago based on message from Zach 1827.  She came with a [employer] and she describes 'fleeing' from her  and his family. 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 She left [facility] due to fear of the [place] trying to kill her. She has been staying at the [facility] in [place] because she was looking for a safe place to stay, pray to God. The staff called 911 due to her paranoia, stealing residents blankets, poor sleep, etc. Since admission, she has expressed fear of color Red which she associates with the Devil. The above symptoms have been present for several weeks, possibly chronic. These symptoms are of moderate to high severity. These symptoms are constant in nature. Tessy Johnson presents/reports/evidences the following emotional symptoms today, 3/5/2018:delusions and psychotic behavior. The above symptoms have been present for unknown length of time, possibly months to years. These symptoms are of high severity. The symptoms are constant  in nature. Additional symptomatology and features include continued Samaritan delusions, poor insight. Mood stable. Improved sleep over night. Remains guarded, delusional and circumstantial but redirect able. No prn's needed. 3/4-Improved paranoia, still internally preoccupied and responding. C/O fingers rigidity, ROM-intact. Cogentin initiated. 3/5- stable behavior, less intrusive and inappropriate with peers, but she remains highly delusional. Patient has limited concerns about her own wellbeing and safety, when she is following directions from God. Compliant wiht medications. She is more focused on discharge. SIDE EFFECTS: (reviewed/updated 3/5/2018)  None reported or admitted to. ALLERGIES:(reviewed/updated 3/5/2018)  No Known Allergies   MEDICATIONS PRIOR TO ADMISSION:(reviewed/updated 3/5/2018)  Prescriptions Prior to Admission   Medication Sig    predniSONE (DELTASONE) 20 mg tablet Take 20 mg by mouth daily.     fluticasone (FLONASE) 50 mcg/actuation nasal spray 2 Sprays by Both Nostrils route daily.  lisinopril (PRINIVIL, ZESTRIL) 20 mg tablet Take 20 mg by mouth daily.  omeprazole (PRILOSEC) 20 mg capsule Take 20 mg by mouth three (3) times daily. PAST MEDICAL HISTORY: Past medical history from the initial psychiatric evaluation has been reviewed (reviewed/updated 3/5/2018) with no additional updates (I asked patient and no additional past medical history provided). Past Medical History:   Diagnosis Date    Hypertension     Ill-defined condition     GERD   History reviewed. No pertinent surgical history. SOCIAL HISTORY: Social history from the initial psychiatric evaluation has been reviewed (reviewed/updated 3/5/2018) with no additional updates (I asked patient and no additional social history provided). Social History     Social History    Marital status: LEGALLY      Spouse name: N/A    Number of children: N/A    Years of education: N/A     Occupational History    Not on file. Social History Main Topics    Smoking status: Never Smoker    Smokeless tobacco: Never Used    Alcohol use No    Drug use: Not on file    Sexual activity: Not on file     Other Topics Concern    Not on file     Social History Narrative      FAMILY HISTORY: Family history from the initial psychiatric evaluation has been reviewed (reviewed/updated 3/5/2018) with no additional updates (I asked patient and no additional family history provided). History reviewed. No pertinent family history.     REVIEW OF SYSTEMS: (reviewed/updated 3/5/2018)  Appetite:good   Sleep: good   All other Review of Systems: Negative except delusions, bizarre behavior         [facility] (MSE):    MSE FINDINGS ARE WITHIN NORMAL LIMITS (WNL) UNLESS OTHERWISE STATED BELOW. ( ALL OF THE BELOW CATEGORIES OF THE MSE HAVE BEEN REVIEWED (reviewed 3/5/2018) AND UPDATED AS DEEMED APPROPRIATE )  General Presentation age appropriate and casually dressed, cooperative   Orientation oriented to time, place and person   Vital Signs  See below (reviewed 3/5/2018); Vital Signs (BP, Pulse, & Temp) are within normal limits if not listed below. Gait and Station Stable/steady, no ataxia   Musculoskeletal System No extrapyramidal symptoms (EPS); no abnormal muscular movements or Tardive Dyskinesia (TD); muscle strength and tone are within normal limits   Language No aphasia or dysarthria   Speech:  normal pitch and normal volume   Thought Processes normal rate of thoughts; irrational thinking; Illogical thoughts   Thought Associations goal directed   Thought Content (+)delusional- [place], bizarre   Suicidal Ideations none   Homicidal Ideations none   Mood:  euthymic   Affect:  mood-congruent   Memory recent  good   Memory remote:  good   Concentration/Attention:  wnl   Fund of Knowledge wnl   Insight:  poor   Reliability poor   Judgment:  poor          VITALS:     Patient Vitals for the past 24 hrs:   Temp Pulse Resp BP SpO2   03/05/18 0733 98.3 °F (36.8 °C) 69 16 124/81 100 %   03/04/18 2011 98.6 °F (37 °C) 86 16 144/81 -   03/04/18 1700 98.1 °F (36.7 °C) 78 14 (!) 171/106 100 %     Wt Readings from Last 3 Encounters:   03/03/18 75.8 kg (167 lb 1.6 oz)   02/25/18 77.1 kg (170 lb)     Temp Readings from Last 3 Encounters:   03/05/18 98.3 °F (36.8 °C)   02/25/18 98.6 °F (37 °C)     BP Readings from Last 3 Encounters:   03/05/18 124/81   02/25/18 114/70     Pulse Readings from Last 3 Encounters:   03/05/18 69   02/25/18 78            DATA     LABORATORY DATA:(reviewed/updated 3/5/2018)  No results found for this or any previous visit (from the past 24 hour(s)).   No results found for: VALF2, VALAC, VALP, VALPR, DS6, CRBAM, CRBAMP, CARB2, XCRBAM  No results found for: LITHM   RADIOLOGY REPORTS:(reviewed/updated 3/5/2018)  Ct Head Wo Cont    Result Date: 2/25/2018  EXAM:  CT HEAD WO CONT INDICATION:   Confusion/delirium, altered LOC, unexplained COMPARISON: None. CONTRAST:  None. TECHNIQUE: Unenhanced CT of the head was performed using 5 mm images. Brain and bone windows were generated. CT dose reduction was achieved through use of a standardized protocol tailored for this examination and automatic exposure control for dose modulation. FINDINGS: The ventricles and sulci are normal in size, shape and configuration and midline. There is periventricular hypoattenuation. There is no intracranial hemorrhage, extra-axial collection, mass, mass effect or midline shift. The basilar cisterns are open. No acute infarct is identified. The bone windows demonstrate no abnormalities. The visualized portions of the paranasal sinuses and mastoid air cells are clear. IMPRESSION: No acute findings.           MEDICATIONS     ALL MEDICATIONS:   Current Facility-Administered Medications   Medication Dose Route Frequency    polyvinyl alcohol (LIQUIFILM TEARS) 1.4 % ophthalmic solution 2 Drop  2 Drop Both Eyes PRN    benztropine (COGENTIN) tablet 1 mg  1 mg Oral BID    risperiDONE (RisperDAL) tablet 4 mg  4 mg Oral QHS    lisinopril (PRINIVIL, ZESTRIL) tablet 20 mg  20 mg Oral DAILY    pantoprazole (PROTONIX) tablet 40 mg  40 mg Oral ACB    ziprasidone (GEODON) 20 mg in sterile water (preservative free) 1 mL injection  20 mg IntraMUSCular BID PRN    OLANZapine (ZyPREXA) tablet 5 mg  5 mg Oral Q6H PRN    benztropine (COGENTIN) tablet 2 mg  2 mg Oral BID PRN    benztropine (COGENTIN) injection 2 mg  2 mg IntraMUSCular BID PRN    LORazepam (ATIVAN) injection 2 mg  2 mg IntraMUSCular Q4H PRN    LORazepam (ATIVAN) tablet 1 mg  1 mg Oral Q4H PRN    zolpidem (AMBIEN) tablet 5 mg  5 mg Oral QHS PRN    acetaminophen (TYLENOL) tablet 650 mg  650 mg Oral Q4H PRN    ibuprofen (MOTRIN) tablet 400 mg  400 mg Oral Q8H PRN    magnesium hydroxide (MILK OF MAGNESIA) 400 mg/5 mL oral suspension 30 mL  30 mL Oral DAILY PRN    nicotine (NICODERM CQ) 21 mg/24 hr patch 1 Patch  1 Patch TransDERmal DAILY PRN      SCHEDULED MEDICATIONS:   Current Facility-Administered Medications   Medication Dose Route Frequency    benztropine (COGENTIN) tablet 1 mg  1 mg Oral BID    risperiDONE (RisperDAL) tablet 4 mg  4 mg Oral QHS    lisinopril (PRINIVIL, ZESTRIL) tablet 20 mg  20 mg Oral DAILY    pantoprazole (PROTONIX) tablet 40 mg  40 mg Oral ACB          ASSESSMENT & PLAN     DIAGNOSES REQUIRING ACTIVE TREATMENT AND MONITORING: (reviewed/updated 3/5/2018)  Patient C/Jada Wright 1106 Problem List:   Psychotic disorder (2/25/2018)    Assessment: severe; schizophrenia most likely diagnosis without evidence of mood component    Plan: Agree with inpatient hospitalization for further stabilization, safety monitoring and medication management  Medications- risperdal 4mg at night   consult      In summary, Dayanna Holt, is a 65 y.o.  female who presents with a severe exacerbation of the principal diagnosis of Psychotic disorder  Patient's condition is improving. Patient requires continued inpatient hospitalization for further stabilization, safety monitoring and medication management. I will continue to coordinate the provision of individual, milieu, occupational, group, and substance abuse therapies to address target symptoms/diagnoses as deemed appropriate for the individual patient. A coordinated, multidisplinary treatment team round was conducted with the patient (this team consists of the nurse, psychiatric unit pharmcist,  and writer). Complete current electronic health record for patient has been reviewed today including consultant notes, ancillary staff notes, nurses and psychiatric tech notes. Suicide risk assessment completed and patient deemed to be of low risk for suicide at this time. The following regarding medications was addressed during rounds with patient:   the risks and benefits of the proposed medication.  The patient was given the opportunity to ask questions. Informed consent given to the use of the above medications. Will continue to adjust psychiatric and non-psychiatric medications (see above \"medication\" section and orders section for details) as deemed appropriate & based upon diagnoses and response to treatment. I will continue to order blood tests/labs and diagnostic tests as deemed appropriate and review results as they become available (see orders for details and above listed lab/test results). I will order psychiatric records from previous [facility] hospitals to further elucidate the nature of patient's psychopathology and review once available. I will gather additional collateral information from friends, family and o/p treatment team to further elucidate the nature of patient's psychopathology and baselline level of psychiatric functioning. I certify that this patient's inpatient psychiatric hospital services furnished since the previous certification were, and continue to be, required for treatment that could reasonably be expected to improve the patient's condition, or for diagnostic study, and that the patient continues to need, on a daily basis, active treatment furnished directly by or requiring the supervision of inpatient psychiatric facility personnel. In addition, the hospital records show that services furnished were intensive treatment services, admission or related services, or equivalent services.     EXPECTED DISCHARGE DATE/DAY: TBD     DISPOSITION: Home       Signed By:   Zahraa Contreras MD  2/28/18

## 2018-03-05 NOTE — BH NOTES
GROUP THERAPY PROGRESS NOTE    Conception Markus participated in the Acute Unit's afternoon Process Group for yesterday, with a focus on identifying feelings, planning for the rest of the day, and preparing for discharge. Group time: 65 minutes. Personal goal for participation: To increase the capacity to improve ones mood and structure. Goal orientation: The patient will be able to identify their feelings, develop a plan for structuring their day, and discharge planning. Group therapy participation: With prompting, this patient participated in the group. Therapeutic interventions reviewed and discussed: The group members were asked to introduce themselves to each other and to see if they could identify an emotion they are having and/or let the group know what they want to focus on for the day as they continue to make discharge plans. Impression of participation: The patient said she has been trying to figure out why she was here in the hospital. She said she contacted her outpatient  who helped walk her through some of the events prior to coming into the hospital. She also said her attending psychiatrist was helping her define what \"the elephant [in the room]\" was about for her. Her thinking appears clearer and more easily focused on her need to gain more understanding of her illness. She expressed no current SI/HI and displayed no overt psychotic symptoms in this group. Her affect was mildly euphoric and anxious. Her mood matched her affect. She is showing very gradual improvement as she works on her denial of her mental illness.

## 2018-03-05 NOTE — PROGRESS NOTES
Problem: Psychosis  Goal: *STG: Remains safe in hospital  Outcome: Progressing Towards Goal  Pt resting in bed with eyes closed. Continues on q15 min checks and Standard Falls Precautions for safety. Will continue to monitor and assess pt.

## 2018-03-05 NOTE — PROGRESS NOTES
Problem: Psychosis  Goal: *STG: Remains safe in hospital  Outcome: Progressing Towards Goal  Pt. Continues to take scheduled meds, seeing a decrease in confusion and delusional thinking, Q 15 min checks routinely, remains visible on the unit, [facility], standard fall precautions      Problem: Anxiety-Behavioral Health (Adult/Pediatric)  Goal: *STG: Participates in treatment plan  Outcome: Progressing Towards Goal  Verbalizes changes in mood and anxiety level to staff, Medication compliant, Attends groups and participates, Denies SI and HI    Problem: Discharge Planning  Goal: *Knowledge of medication management         Problem: Falls - Risk of  Goal: *Absence of Falls  Document Denae Fall Risk and appropriate interventions in the flowsheet. Outcome: Progressing Towards Goal  Fall Risk Interventions:     Pt. Absent of falls, standard fall precautions, Q 15 min checks, steady gait, WBAT       Medication Interventions: Teach patient to arise slowly    Elimination Interventions:  Toilet paper/wipes in reach    History of Falls Interventions: Room close to nurse's station

## 2018-03-05 NOTE — BH NOTES
PSYCHIATRIC PROGRESS NOTE         Patient Name  Gilbert Schaefer   Date of Birth 1955   [facility] [id number]   Medical Record Number  [id number]      Age  65 y.o. PCP Bianca Sanchez, MD   Admit date:  2/25/2018    Room Number  734/01  @ [facility]   Date of Service  3/1/18          PSYCHOTHERAPY SESSION NOTE:  Length of psychotherapy session: 20 minutes    Main condition/diagnosis/issues treated during session today, 3/4/2018 : delusional    I employed Cognitive Behavioral therapy techniques, Reality-Oriented psychotherapy, as well as supportive psychotherapy in regards to various ongoing psychosocial stressors, including the following: pre-admission and current problems; housing issues; occupational issues; academic issues; legal issues; medical issues; and stress of hospitalization. Interpersonal relationship issues and psychodynamic conflicts explored. Attempts made to alleviate maladaptive patterns. We, also, worked on issues of denial & effects of substance dependency/use     Overall, patient is not progressing    Treatment Plan Update (reviewed an updated 3/4/2018) : I will modify psychotherapy tx plan by implementing more stress management strategies, building upon cognitive behavioral techniques, increasing coping skills, as well as shoring up psychological defenses). E & M PROGRESS NOTE:         HISTORY       CC:  \"I\"m fine\"  HISTORY OF PRESENT ILLNESS/INTERVAL HISTORY:  (reviewed/updated 3/4/2018). per initial evaluation:   The patient, Gilbert Schaefer, is a 65 y.o.  OTHER female with unknown psychiatric history, who presents at this time with complaints of (and/or evidence of) the following emotional symptoms: psychotic behavior. Additional symptomatology include bizarre and impulsive behavior. The patient moved to the [address] [place] [address],[address] from [facility], two years ago based on message from Zach 1827.  She came with a [employer] and she describes 'fleeing' from her  and his family. 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 She left [facility] due to fear of the [place] trying to kill her. She has been staying at the [facility] in [place] because she was looking for a safe place to stay, pray to God. The staff called 911 due to her paranoia, stealing residents blankets, poor sleep, etc. Since admission, she has expressed fear of color Red which she associates with the Devil. The above symptoms have been present for several weeks, possibly chronic. These symptoms are of moderate to high severity. These symptoms are constant in nature. Amira Liriano presents/reports/evidences the following emotional symptoms today, 3/4/2018:delusions and psychotic behavior. The above symptoms have been present for unknown length of time, possibly months to years. These symptoms are of high severity. The symptoms are constant  in nature. Additional symptomatology and features include continued Mandaen delusions, poor insight. Mood stable. Improved sleep over night. Remains guarded, delusional and circumstantial but redirect able. No prn's needed. 3/4-Improved paranoia, still internally preoccupied and responding. C/O fingers rigidity, ROM-intact. Cogentin initiated. SIDE EFFECTS: (reviewed/updated 3/4/2018)  None reported or admitted to. ALLERGIES:(reviewed/updated 3/4/2018)  No Known Allergies   MEDICATIONS PRIOR TO ADMISSION:(reviewed/updated 3/4/2018)  Prescriptions Prior to Admission   Medication Sig    lisinopril (PRINIVIL, ZESTRIL) 20 mg tablet Take 20 mg by mouth daily.  omeprazole (PRILOSEC) 20 mg capsule Take 20 mg by mouth three (3) times daily.       PAST MEDICAL HISTORY: Past medical history from the initial psychiatric evaluation has been reviewed (reviewed/updated 3/4/2018) with no additional updates (I asked patient and no additional past medical history provided). Past Medical History:   Diagnosis Date    Hypertension     Ill-defined condition     GERD   History reviewed. No pertinent surgical history. SOCIAL HISTORY: Social history from the initial psychiatric evaluation has been reviewed (reviewed/updated 3/4/2018) with no additional updates (I asked patient and no additional social history provided). Social History     Social History    Marital status: LEGALLY      Spouse name: N/A    Number of children: N/A    Years of education: N/A     Occupational History    Not on file. Social History Main Topics    Smoking status: Never Smoker    Smokeless tobacco: Never Used    Alcohol use No    Drug use: Not on file    Sexual activity: Not on file     Other Topics Concern    Not on file     Social History Narrative      FAMILY HISTORY: Family history from the initial psychiatric evaluation has been reviewed (reviewed/updated 3/4/2018) with no additional updates (I asked patient and no additional family history provided). History reviewed. No pertinent family history. REVIEW OF SYSTEMS: (reviewed/updated 3/4/2018)  Appetite:good   Sleep: good   All other Review of Systems: Negative except delusions, bizarre behavior         [facility] ([facility]):    [facility] FINDINGS ARE WITHIN NORMAL LIMITS (WNL) UNLESS OTHERWISE STATED BELOW. ( ALL OF THE BELOW CATEGORIES OF THE [facility] HAVE BEEN REVIEWED (reviewed 3/4/2018) AND UPDATED AS DEEMED APPROPRIATE )  General Presentation age appropriate and casually dressed, cooperative   Orientation oriented to time, place and person   Vital Signs  See below (reviewed 3/4/2018); Vital Signs (BP, Pulse, & Temp) are within normal limits if not listed below.    Gait and Station Stable/steady, no ataxia   Musculoskeletal System No extrapyramidal symptoms (EPS); no abnormal muscular movements or Tardive Dyskinesia (TD); muscle strength and tone are within normal limits   Language No aphasia or dysarthria   Speech:  normal pitch and normal volume   Thought Processes normal rate of thoughts; irrational thinking; Illogical thoughts   Thought Associations goal directed   Thought Content (+)delusional- Gnosticist, bizarre   Suicidal Ideations none   Homicidal Ideations none   Mood:  euthymic   Affect:  mood-congruent   Memory recent  good   Memory remote:  good   Concentration/Attention:  wnl   Fund of Knowledge wnl   Insight:  poor   Reliability poor   Judgment:  poor          VITALS:     Patient Vitals for the past 24 hrs:   Temp Pulse Resp BP SpO2   03/04/18 1700 98.1 °F (36.7 °C) 78 14 (!) 171/106 100 %   03/04/18 0800 98.3 °F (36.8 °C) 73 16 129/82 100 %   03/03/18 2016 98 °F (36.7 °C) 77 18 143/83 -     Wt Readings from Last 3 Encounters:   03/03/18 75.8 kg (167 lb 1.6 oz)   02/25/18 77.1 kg (170 lb)     Temp Readings from Last 3 Encounters:   03/04/18 98.1 °F (36.7 °C)   02/25/18 98.6 °F (37 °C)     BP Readings from Last 3 Encounters:   03/04/18 (!) 171/106   02/25/18 114/70     Pulse Readings from Last 3 Encounters:   03/04/18 78   02/25/18 78            DATA     LABORATORY DATA:(reviewed/updated 3/4/2018)  No results found for this or any previous visit (from the past 24 hour(s)). No results found for: VALF2, VALAC, VALP, VALPR, DS6, CRBAM, CRBAMP, CARB2, XCRBAM  No results found for: LITHM   RADIOLOGY REPORTS:(reviewed/updated 3/4/2018)  Ct Head Wo Cont    Result Date: 2/25/2018  EXAM:  CT HEAD WO CONT INDICATION:   Confusion/delirium, altered LOC, unexplained COMPARISON: None. CONTRAST:  None. TECHNIQUE: Unenhanced CT of the head was performed using 5 mm images. Brain and bone windows were generated. CT dose reduction was achieved through use of a standardized protocol tailored for this examination and automatic exposure control for dose modulation. FINDINGS: The ventricles and sulci are normal in size, shape and configuration and midline. There is periventricular hypoattenuation.  There is no intracranial hemorrhage, extra-axial collection, mass, mass effect or midline shift. The basilar cisterns are open. No acute infarct is identified. The bone windows demonstrate no abnormalities. The visualized portions of the paranasal sinuses and mastoid air cells are clear. IMPRESSION: No acute findings.           MEDICATIONS     ALL MEDICATIONS:   Current Facility-Administered Medications   Medication Dose Route Frequency    benztropine (COGENTIN) tablet 1 mg  1 mg Oral BID    risperiDONE (RisperDAL) tablet 4 mg  4 mg Oral QHS    lisinopril (PRINIVIL, ZESTRIL) tablet 20 mg  20 mg Oral DAILY    pantoprazole (PROTONIX) tablet 40 mg  40 mg Oral ACB    ziprasidone (GEODON) 20 mg in sterile water (preservative free) 1 mL injection  20 mg IntraMUSCular BID PRN    OLANZapine (ZyPREXA) tablet 5 mg  5 mg Oral Q6H PRN    benztropine (COGENTIN) tablet 2 mg  2 mg Oral BID PRN    benztropine (COGENTIN) injection 2 mg  2 mg IntraMUSCular BID PRN    LORazepam (ATIVAN) injection 2 mg  2 mg IntraMUSCular Q4H PRN    LORazepam (ATIVAN) tablet 1 mg  1 mg Oral Q4H PRN    zolpidem (AMBIEN) tablet 5 mg  5 mg Oral QHS PRN    acetaminophen (TYLENOL) tablet 650 mg  650 mg Oral Q4H PRN    ibuprofen (MOTRIN) tablet 400 mg  400 mg Oral Q8H PRN    magnesium hydroxide (MILK OF MAGNESIA) 400 mg/5 mL oral suspension 30 mL  30 mL Oral DAILY PRN    nicotine (NICODERM CQ) 21 mg/24 hr patch 1 Patch  1 Patch TransDERmal DAILY PRN      SCHEDULED MEDICATIONS:   Current Facility-Administered Medications   Medication Dose Route Frequency    benztropine (COGENTIN) tablet 1 mg  1 mg Oral BID    risperiDONE (RisperDAL) tablet 4 mg  4 mg Oral QHS    lisinopril (PRINIVIL, ZESTRIL) tablet 20 mg  20 mg Oral DAILY    pantoprazole (PROTONIX) tablet 40 mg  40 mg Oral ACB          ASSESSMENT & PLAN     DIAGNOSES REQUIRING ACTIVE TREATMENT AND MONITORING: (reviewed/updated 3/4/2018)  Patient Active Hospital Problem List:   Psychotic disorder (2/25/2018)    Assessment: severe; schizophrenia most likely diagnosis without evidence of mood component    Plan: Agree with inpatient hospitalization for further stabilization, safety monitoring and medication management  Medications- risperdal 4mg at night   consult      In summary, Chuy Zavala, is a 65 y.o.  female who presents with a severe exacerbation of the principal diagnosis of Psychotic disorder  Patient's condition is improving. Patient requires continued inpatient hospitalization for further stabilization, safety monitoring and medication management. I will continue to coordinate the provision of individual, milieu, occupational, group, and substance abuse therapies to address target symptoms/diagnoses as deemed appropriate for the individual patient. A coordinated, multidisplinary treatment team round was conducted with the patient (this team consists of the nurse, psychiatric unit pharmcist,  and writer). Complete current electronic health record for patient has been reviewed today including consultant notes, ancillary staff notes, nurses and psychiatric tech notes. Suicide risk assessment completed and patient deemed to be of low risk for suicide at this time. The following regarding medications was addressed during rounds with patient:   the risks and benefits of the proposed medication. The patient was given the opportunity to ask questions. Informed consent given to the use of the above medications. Will continue to adjust psychiatric and non-psychiatric medications (see above \"medication\" section and orders section for details) as deemed appropriate & based upon diagnoses and response to treatment. I will continue to order blood tests/labs and diagnostic tests as deemed appropriate and review results as they become available (see orders for details and above listed lab/test results).     I will order psychiatric records from previous psych hospitals to further elucidate the nature of patient's psychopathology and review once available. I will gather additional collateral information from friends, family and o/p treatment team to further elucidate the nature of patient's psychopathology and baselline level of psychiatric functioning. I certify that this patient's inpatient psychiatric hospital services furnished since the previous certification were, and continue to be, required for treatment that could reasonably be expected to improve the patient's condition, or for diagnostic study, and that the patient continues to need, on a daily basis, active treatment furnished directly by or requiring the supervision of inpatient psychiatric facility personnel. In addition, the hospital records show that services furnished were intensive treatment services, admission or related services, or equivalent services.     EXPECTED DISCHARGE DATE/DAY: TBD     DISPOSITION: Home       Signed By:   Janny Merchant MD  2/28/18

## 2018-03-05 NOTE — BH NOTES
GROUP THERAPY PROGRESS NOTE    The patient Mal Johnson is participating in [employer]. Group time: 30 minutes    Personal goal for participation: to orient the patient to the unit.     Goal orientation: successful adoption of unit rules    Group therapy participation: active    Therapeutic interventions reviewed and discussed: Yes    Impression of participation:     Ang David  3/5/2018 10:24 AM

## 2018-03-06 VITALS
OXYGEN SATURATION: 100 % | WEIGHT: 167.1 LBS | BODY MASS INDEX: 26.86 KG/M2 | DIASTOLIC BLOOD PRESSURE: 84 MMHG | HEIGHT: 66 IN | HEART RATE: 73 BPM | SYSTOLIC BLOOD PRESSURE: 137 MMHG | TEMPERATURE: 98 F | RESPIRATION RATE: 18 BRPM

## 2018-03-06 PROCEDURE — 74011250637 HC RX REV CODE- 250/637: Performed by: HOSPITALIST

## 2018-03-06 PROCEDURE — 74011250637 HC RX REV CODE- 250/637: Performed by: PSYCHIATRY & NEUROLOGY

## 2018-03-06 RX ORDER — RISPERIDONE 4 MG/1
4 TABLET, FILM COATED ORAL
Qty: 30 TAB | Refills: 0 | Status: SHIPPED | OUTPATIENT
Start: 2018-03-06 | End: 2019-01-25

## 2018-03-06 RX ORDER — BENZTROPINE MESYLATE 1 MG/1
1 TABLET ORAL 2 TIMES DAILY
Qty: 60 TAB | Refills: 0 | Status: SHIPPED | OUTPATIENT
Start: 2018-03-06 | End: 2019-01-25

## 2018-03-06 RX ORDER — LISINOPRIL 20 MG/1
20 TABLET ORAL DAILY
Qty: 30 TAB | Refills: 0 | Status: SHIPPED | OUTPATIENT
Start: 2018-03-07 | End: 2019-01-25 | Stop reason: SDUPTHER

## 2018-03-06 RX ORDER — PANTOPRAZOLE SODIUM 40 MG/1
40 TABLET, DELAYED RELEASE ORAL
Qty: 30 TAB | Refills: 0 | Status: SHIPPED | OUTPATIENT
Start: 2018-03-07 | End: 2019-01-25

## 2018-03-06 RX ADMIN — BENZTROPINE MESYLATE 1 MG: 1 TABLET ORAL at 09:09

## 2018-03-06 RX ADMIN — LISINOPRIL 20 MG: 20 TABLET ORAL at 09:09

## 2018-03-06 RX ADMIN — PANTOPRAZOLE SODIUM 40 MG: 40 TABLET, DELAYED RELEASE ORAL at 06:35

## 2018-03-06 RX ADMIN — POLYVINYL ALCOHOL 2 DROP: 14 SOLUTION/ DROPS OPHTHALMIC at 12:28

## 2018-03-06 NOTE — PROGRESS NOTES
Problem: Psychosis  Goal: *STG: Remains safe in hospital  Outcome: Progressing Towards Goal   Pt. Remains safe  Goal: *STG: Seeks staff when feelings of self harm or harm towards others arise  Outcome: Progressing Towards Goal  Pt. Religiously preoccupied  Focusing on  \"God\" talking to her   Goal: *STG/LTG: Complies with medication therapy  Outcome: Progressing Towards Goal  Medication   Compliant     Reviewed in treatment team  Goal: Interventions  Outcome: Progressing Towards Goal  Will continue to monitor  On 15 min. Checks for safety   Assess thought process     Medication compliance effectiveness   Encourage groups    Problem: Falls - Risk of  Goal: *Absence of Falls  Document Denae Fall Risk and appropriate interventions in the flowsheet. Outcome: Progressing Towards Goal  Fall Risk Interventions:     Non slip socks   Bed in low position       Medication Interventions: Teach patient to arise slowly    Elimination Interventions:  Toilet paper/wipes in reach    History of Falls Interventions: Room close to nurse's station

## 2018-03-06 NOTE — DISCHARGE INSTRUCTIONS
DISCHARGE SUMMARY    NAME:Leda Ealine  : 1955  MRN: [id number]    The patient Jamie Sanchez exhibits the ability to control behavior in a less restrictive environment. Patient's level of functioning is improving. No assaultive/destructive behavior has been observed for the past 24 hours. No suicidal/homicidal threat or behavior has been observed for the past 24 hours. There is no evidence of serious medication side effects. Patient has not been in physical or protective restraints for at least the past 24 hours. If weapons involved, how are they secured? No weapons involved     Is patient aware of and in agreement with discharge plan? Patient is aware of discharged and is in agreement     Arrangements for medication:  Prescriptions filled at [facility] .      Referral for substance abuse treatment ? no    Referral for smoking cessation needed ? no    Copy of discharge instructions to  provider?:  Yes     Arrangements for transportation home:  Friend to     Keep all follow up appointments as scheduled, continue to take prescribed medications per physician instructions. Mental health crisis number:  [phone] or your local mental health crisis line number at [facility] from Nurse  Prescriptions filled       PATIENT INSTRUCTIONS:        What to do at Home:  Recommended activity: Activity as tolerated,         *  Please give a list of your current medications to your Primary Care Provider. *  Please update this list whenever your medications are discontinued, doses are      changed, or new medications (including over-the-counter products) are added. *  Please carry medication information at all times in case of emergency situations.     These are general instructions for a healthy lifestyle:    No smoking/ No tobacco products/ Avoid exposure to second hand smoke  Surgeon General's Warning:  Quitting smoking now greatly reduces serious risk to your health. Obesity, smoking, and sedentary lifestyle greatly increases your risk for illness    A healthy diet, regular physical exercise & weight monitoring are important for maintaining a healthy lifestyle    You may be retaining fluid if you have a history of heart failure or if you experience any of the following symptoms:  Weight gain of 3 pounds or more overnight or 5 pounds in a week, increased swelling in our hands or feet or shortness of breath while lying flat in bed. Please call your doctor as soon as you notice any of these symptoms; do not wait until your next office visit. Recognize signs and symptoms of STROKE:    F-face looks uneven    A-arms unable to move or move unevenly    S-speech slurred or non-existent    T-time-call 911 as soon as signs and symptoms begin-DO NOT go       Back to bed or wait to see if you get better-TIME IS BRAIN. Warning Signs of HEART ATTACK     Call 911 if you have these symptoms:   Chest discomfort. Most heart attacks involve discomfort in the center of the chest that lasts more than a few minutes, or that goes away and comes back. It can feel like uncomfortable pressure, squeezing, fullness, or pain.  Discomfort in other areas of the upper body. Symptoms can include pain or discomfort in one or both arms, the back, neck, jaw, or stomach.  Shortness of breath with or without chest discomfort.  Other signs may include breaking out in a cold sweat, nausea, or lightheadedness. Don't wait more than five minutes to call 911 - MINUTES MATTER! Fast action can save your life. Calling 911 is almost always the fastest way to get lifesaving treatment. Emergency Medical Services staff can begin treatment when they arrive -- up to an hour sooner than if someone gets to the hospital by car. The discharge information has been reviewed with the patient. The patient verbalized understanding.   Discharge medications reviewed with the patient and appropriate educational materials and side effects teaching were provided. MyChart Activation    Thank you for requesting access to [facility]. Please follow the instructions below to securely access and download your online medical record. [facility] allows you to send messages to your doctor, view your test results, renew your prescriptions, schedule appointments, and more. How Do I Sign Up? 1. In your internet browser, go to [email or web address]  2. Click on the First Time User? Click Here link in the Sign In box. You will be redirect to the New Member Sign Up page. 3. Enter your [facility] Access Code exactly as it appears below. You will not need to use this code after youve completed the sign-up process. If you do not sign up before the expiration date, you must request a new code. [facility] Access Code: [email or web address]  Expires: 2018  8:50 AM (This is the date your [facility] access code will )    4. Enter the last four digits of your Social Security Number (xxxx) and Date of Birth (mm/dd/yyyy) as indicated and click Submit. You will be taken to the next sign-up page. 5. Create a [facility] ID. This will be your [facility] login ID and cannot be changed, so think of one that is secure and easy to remember. 6. Create a [facility] password. You can change your password at any time. 7. Enter your Password Reset Question and Answer. This can be used at a later time if you forget your password. 8. Enter your e-mail address. You will receive e-mail notification when new information is available in [address]. 9. Click Sign Up. You can now view and download portions of your medical record. 10. Click the Download Summary menu link to download a portable copy of your medical information. Additional Information    If you have questions, please visit the Frequently Asked Questions section of the [facility] website at [email or web address] Remember, [facility] is NOT to be used for urgent needs.  For medical emergencies, dial 911.        ___________________________________________________________________________________________________________________________________

## 2018-03-06 NOTE — BH NOTES
GROUP THERAPY PROGRESS NOTE    Daniel Mcqueen is participating in [employer]. Group time: 30 minutes    Personal goal for participation: unit orientation and daily progress    Goal orientation: personal    Group therapy participation: active    Therapeutic interventions reviewed and discussed: \"Warning Signs\" and \"Relaxation\" word search handouts. Impression of participation: Complained of sleeping too much because of the medications she is on which prevents her from focusing on her [employer] works. Talked of events prior to admission involving staying at the [facility] and incident which led to current hospitalization. Seems in fair spirits and comfortable discussing personal issues. States further interested in volunteering for this hospital after discharge.

## 2018-03-06 NOTE — BH NOTES
Pt discharged with family friend at this time. Prescriptions, valuables, and belongings returned to pt. Discharge instructions provided.

## 2018-03-06 NOTE — INTERDISCIPLINARY ROUNDS
Behavioral Health Interdisciplinary Rounds     Patient Name: Christian Simeon  Age: 65 y.o.   Room/Bed:  [facility]/  Primary Diagnosis: Psychotic disorder   Admission Status: Involuntary Commitment     Readmission within 30 days: no  Power of  in place: no  Patient requires a blocked bed: no          Reason for blocked bed:     VTE Prophylaxis: No  Flu vaccine given : yes , PTA  Mobility needs/Fall risk: no    Nutritional Plan: no  Consults:        Labs/Testing due today?: no    Sleep hours:  6 hours 45 min       Participation in Care/Groups:  yes  Medication Compliant?: Yes  PRNS (last 24 hours): None    Restraints (last 24 hours):  no  Substance Abuse:  no  CIWA (range last 24 hours):  COWS (range last 24 hours):   Alcohol screening (AUDIT) completed -  AUDIT Score: 1  If applicable, date SBIRT discussed in treatment team AND documented:   Tobacco - patient is a smoker: no   Date tobacco education completed by RN:   24 hour chart check complete: yes     Patient goal(s) for today:   Treatment team focus/goals:   Progress note     LOS:  9  Expected LOS:    Financial concerns/prescription coverage:    Date of last family contact:      Family requesting physician contact today:   Discharge plan:   Guns in the home:       Outpatient provider(s):     Participating treatment team members: Christian Simeon, * (assigned SW),

## 2018-03-06 NOTE — BH NOTES
Behavioral Health Transition Record to Provider    Patient Name: Jose Sparrow  YOB: 1955  Medical Record Number: [id number]  Date of Admission: 2/25/2018  Date of Discharge: 3/6/2018     Attending Provider: Sarah Miranda MD  Discharging Provider: Dr. Carol Ann Hannon   To contact this individual call [phone] and ask the  to page. If unavailable, ask to be transferred to [address] [place] [address] Provider on call. [facility] Provider will be available on call 24/7 and during holidays. Primary Care Provider: Bianca Sanchez MD    No Known Allergies    Reason for Admission: fear of [place] and [place]. Pt admitted under a temporary [facility] order (TDO) severe psychosis  proving to be an imminent danger to self and others and an inability to care for self. Admission Diagnosis: pscyhotic d/o  Psychotic disorder    * No surgery found *    Results for orders placed or performed during the hospital encounter of 02/25/18   HEMOGLOBIN A1C WITH EAG   Result Value Ref Range    Hemoglobin A1c 4.9 4.2 - 6.3 %    Est. average glucose Cannot be calculated mg/dL   GLUCOSE, FASTING   Result Value Ref Range    Glucose 89 65 - 100 MG/DL   LIPID PANEL   Result Value Ref Range    LIPID PROFILE          Cholesterol, total 245 (H) <200 MG/DL    Triglyceride 93 <150 MG/DL    HDL Cholesterol 69 MG/DL    LDL, calculated 157.4 (H) 0 - 100 MG/DL    VLDL, calculated 18.6 MG/DL    CHOL/HDL Ratio 3.6 0 - 5.0     METABOLIC PANEL, BASIC   Result Value Ref Range    Sodium 139 136 - 145 mmol/L    Potassium 4.0 3.5 - 5.1 mmol/L    Chloride 108 97 - 108 mmol/L    CO2 24 21 - 32 mmol/L    Anion gap 7 5 - 15 mmol/L    Glucose 86 65 - 100 mg/dL    BUN 10 6 - 20 MG/DL    Creatinine 1.03 (H) 0.55 - 1.02 MG/DL    BUN/Creatinine ratio 10 (L) 12 - 20      GFR est AA >60 >60 ml/min/1.73m2    GFR est non-AA 54 (L) >60 ml/min/1.73m2    Calcium 8.6 8.5 - 10.1 MG/DL       Immunizations administered during this encounter:    There is no immunization history on file for this patient. Screening for Metabolic Disorders for Patients on Antipsychotic Medications  (Data obtained from the EMR)    Estimated Body Mass Index  Estimated body mass index is 26.97 kg/(m^2) as calculated from the following:    Height as of this encounter: 5' 6\" (1.676 m). Weight as of this encounter: 75.8 kg (167 lb 1.6 oz). Vital Signs/Blood Pressure  Visit Vitals    /70    Pulse 71    Temp 98.4 °F (36.9 °C)    Resp 16    Ht 5' 6\" (1.676 m)    Wt 75.8 kg (167 lb 1.6 oz)    SpO2 100%    Breastfeeding No    BMI 26.97 kg/m2       Blood Glucose/Hemoglobin A1c  Lab Results   Component Value Date/Time    Glucose 89 02/27/2018 06:07 AM    Glucose 86 02/27/2018 06:07 AM       Lab Results   Component Value Date/Time    Hemoglobin A1c 4.9 02/27/2018 06:07 AM        Lipid Panel  Lab Results   Component Value Date/Time    Cholesterol, total 245 (H) 02/27/2018 06:07 AM    HDL Cholesterol 69 02/27/2018 06:07 AM    LDL, calculated 157.4 (H) 02/27/2018 06:07 AM    Triglyceride 93 02/27/2018 06:07 AM    CHOL/HDL Ratio 3.6 02/27/2018 06:07 AM        Discharge Diagnosis: Psychotic Disorder   Discharge Plan: She will be discharge in care a friend. The patient Conception Lime exhibits the ability to control behavior in a less restrictive environment. Patient's level of functioning is improving. No assaultive/destructive behavior has been observed for the past 24 hours. No suicidal/homicidal threat or behavior has been observed for the past 24 hours. There is no evidence of serious medication side effects. Patient has not been in physical or protective restraints for at least the past 24 hours. If weapons involved, how are they secured? No weapons involved     Is patient aware of and in agreement with discharge plan?  Patient is aware of discharged and is in agreement     Arrangements for medication:  Pres[facility] filled at [facility] .      Referral for substance abuse treatment ? no    Referral for smoking cessation needed ? no    Copy of discharge instructions to  provider?:  Yes     Arrangements for transportation home:  Friend to     Keep all follow up appointments as scheduled, continue to take prescribed medications per physician instructions. Mental health crisis number:  [id number] or your local mental health crisis line number at [phone]                 Discharge Medication List and Instructions:   Current Discharge Medication List      START taking these medications    Details   benztropine (COGENTIN) 1 mg tablet Take 1 Tab by mouth two (2) times a day. Indications: extrapyramidal disease  Qty: 60 Tab, Refills: 0      pantoprazole (PROTONIX) 40 mg tablet Take 1 Tab by mouth Daily (before breakfast). Indications: gastroesophageal reflux disease  Qty: 30 Tab, Refills: 0      risperiDONE (RISPERDAL) 4 mg tablet Take 1 Tab by mouth nightly. Indications: Jennifer associated with Bipolar Disorder  Qty: 30 Tab, Refills: 0         CONTINUE these medications which have CHANGED    Details   lisinopril (PRINIVIL, ZESTRIL) 20 mg tablet Take 1 Tab by mouth daily. Indications: hypertension  Qty: 30 Tab, Refills: 0         CONTINUE these medications which have NOT CHANGED    Details   fluticasone (FLONASE) 50 mcg/actuation nasal spray 2 Sprays by Both Nostrils route daily.          STOP taking these medications       predniSONE (DELTASONE) 20 mg tablet Comments:   Reason for Stopping:         omeprazole (PRILOSEC) 20 mg capsule Comments:   Reason for Stopping:               Unresulted Labs     None        To obtain results of studies pending at discharge, please contact [phone]    Follow-up Information     Follow up With Details Comments [address] on 3/7/2018 walk in for services [address], [place], [address] [facility]  Phone: [phone]    Phys Daniel, MD   Patient can only remember the practice name and not the physician            Advanced Directive:   Does the patient have an appointed surrogate decision maker? No  Does the patient have a Medical Advance Directive? No  Does the patient have a Psychiatric Advance Directive? No  If the patient does not have a surrogate or Medical Advance Directive AND Psychiatric Advance Directive, the patient was offered information on these advance directives Patient declined to complete    Patient Instructions: Please continue all medications until otherwise directed by physician. Tobacco Cessation Discharge Plan:   Is the patient a smoker and needs referral for smoking cessation? No  Patient referred to the following for smoking cessation with an appointment? No     Patient was offered medication to assist with smoking cessation at discharge? No  Was education for smoking cessation added to the discharge instructions? Yes    Alcohol/Substance Abuse Discharge Plan:   Does the patient have a history of substance/alcohol abuse and requires a referral for treatment? No  Patient referred to the following for substance/alcohol abuse treatment with an appointment? No  Patient was offered medication to assist with alcohol cessation at discharge? No  Was education for substance/alcohol abuse added to discharge instructions? Not applicable    Patient discharged to Home; discussed with patient/caregiver and provided to the patient/caregiver either in hard copy or electronically.

## 2018-03-06 NOTE — BH NOTES
GROUP THERAPY PROGRESS NOTE    Godwin Hardy participated in the Acute Unit's afternoon Process Group for yesterday, with a focus on identifying feelings, planning for the rest of the day, and preparing for discharge. Group time: 60 minutes. Personal goal for participation: To increase the capacity to improve ones mood and structure. Goal orientation: The patient will be able to identify their feelings, develop a plan for structuring their day, and discharge planning. Group therapy participation: With prompting, this patient participated in the group. Therapeutic interventions reviewed and discussed: The group members were asked to introduce themselves to each other and to see if they could identify an emotion they are having and/or let the group know what they want to focus on for the day as they continue to make discharge plans. Impression of participation: The patient said, \"I didn't like the court hearing this afternoon. \" She doubted the efficacy of her medication and stated a belief that she can \"listen to the voice of God\" and determine what treatment to take or not. She added that she might also receive physical therapy for her ankle and receive a neurological consultation. She expressed no current SI/HI and displayed suspiciousness about her medications and her treatment in general. She spoke in a hyper-Mormon fashion about listening to God, first when her son was being treated when he was around 9years old and now an adult, and she received a dream with a message that he should not receive any medication. She said she listened to that dream and that he lived because she listened to [employer] voice of God. \" She may have been responding to internal stimuli. Her affect was angry and anxious. Her mood matched her affect.

## 2018-03-06 NOTE — PROGRESS NOTES
Pharmacist Discharge Medication Reconciliation    Discharging Provider: Dr. Jitendra Moody PMH:   Past Medical History:   Diagnosis Date    Hypertension     Ill-defined condition     GERD     Chief Complaint for this Admission: No chief complaint on file. Allergies: Review of patient's allergies indicates no known allergies. Discharge Medications:   Current Discharge Medication List        START taking these medications    Details   benztropine (COGENTIN) 1 mg tablet Take 1 Tab by mouth two (2) times a day. Indications: extrapyramidal disease  Qty: 60 Tab, Refills: 0      pantoprazole (PROTONIX) 40 mg tablet Take 1 Tab by mouth Daily (before breakfast). Indications: gastroesophageal reflux disease  Qty: 30 Tab, Refills: 0      risperiDONE (RISPERDAL) 4 mg tablet Take 1 Tab by mouth nightly. Indications: Jennifer associated with Bipolar Disorder  Qty: 30 Tab, Refills: 0           CONTINUE these medications which have CHANGED    Details   lisinopril (PRINIVIL, ZESTRIL) 20 mg tablet Take 1 Tab by mouth daily. Indications: hypertension  Qty: 30 Tab, Refills: 0           CONTINUE these medications which have NOT CHANGED    Details   fluticasone (FLONASE) 50 mcg/actuation nasal spray 2 Sprays by Both Nostrils route daily.            STOP taking these medications       predniSONE (DELTASONE) 20 mg tablet Comments:   Reason for Stopping:         omeprazole (PRILOSEC) 20 mg capsule Comments:   Reason for Stopping:             The patient's chart, MAR and AVS were reviewed by VIRGINIA ZamoraD.

## 2018-03-06 NOTE — BH NOTES
GROUP THERAPY PROGRESS NOTE    Chuy Zavala is participating in [place]. Group time: 15 minutes    Personal goal for participation:   To stay on the path of getting better    Goal orientation: community    Group therapy participation: active    Therapeutic interventions reviewed and discussed: Review of unit guidelines and setting a daily goal.    Impression of participation: active

## 2018-03-18 NOTE — DISCHARGE SUMMARY
PSYCHIATRIC DISCHARGE SUMMARY         IDENTIFICATION:    Patient Name  Leamon Simmonds   Date of Birth 1955   [facility] [id number]   Medical Record Number  [id number]      Age  65 y.o. PCP Bianca Sanchez MD   Admit date:  2/25/2018    Discharge date: 3/18/2018   Room Number  734/01  @ [facility]   Date of Service  3/18/2018            TYPE OF DISCHARGE: REGULAR               CONDITION AT DISCHARGE: improved       PROVISIONAL & DISCHARGE DIAGNOSES:    Problem List  Never Reviewed          Codes Class    * (Principal)Psychotic disorder ICD-10-CM: F29  ICD-9-CM: 298.9               Active Hospital Problems    *Psychotic disorder        DISCHARGE DIAGNOSIS:   Axis I:  SEE ABOVE  Axis II: SEE ABOVE  Axis III: SEE ABOVE  Axis IV:  Limited supports in [place]; unemployed; poor insight; lack of structure  Axis V:  46 on admission, 56 on discharge 66(baseline)       CC & HISTORY OF PRESENT ILLNESS:  The patient, Leamon Simmonds, is a 65 y.o.  OTHER female with unknown psychiatric history, who presents at this time with complaints of (and/or evidence of) the following emotional symptoms: psychotic behavior. Additional symptomatology include bizarre and impulsive behavior. The patient moved to the [address] [place] [address],[address] from [facility] [place], two years ago based on message from [employer]. She came with a [employer] and she describes 'fleeing' from her  and his family. 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 She left [facility] due to fear of the Lenice Dilling trying to kill her. She has been staying at the [facility] in [place] because she was looking for a safe place to stay, pray to God. The staff called 911 due to her paranoia, stealing residents blankets, poor sleep, etc. Since admission, she has expressed fear of color Red which she associates with the Devil.   The above symptoms have been present for several weeks, possibly chronic. These symptoms are of moderate to high severity. These symptoms are constant in nature     SOCIAL HISTORY:    Social History     Social History    Marital status: LEGALLY      Spouse name: N/A    Number of children: N/A    Years of education: N/A     Occupational History    Not on file. Social History Main Topics    Smoking status: Never Smoker    Smokeless tobacco: Never Used    Alcohol use No    Drug use: Not on file    Sexual activity: Not on file     Other Topics Concern    Not on file     Social History Narrative      FAMILY HISTORY:   History reviewed. No pertinent family history. HOSPITALIZATION COURSE:    Go Musa was admitted to the inpatient psychiatric unit [facility] for acute psychiatric stabilization in regards to symptomatology as described in the HPI above. The differential diagnosis at time of admission included:MDD with psychosis vs. bipolar dis vs. schizoaffective vs. Schizophrenia. While on the unit Go Musa was involved in individual, group, occupational and milieu therapy. Psychiatric medications were adjusted during this hospitalization including (see below). Go Musa demonstrated a slow, but progressive improvement in overall condition. .  Please see individual progress notes for more specific details regarding patient's hospitalization course. At time of discharge, Go Musa is without significant problems of depression, SI, HI, bert or poor cognition. Patient free of suicidal and homicidal ideations (appears to be at very low risk of suicide or homicide) and reports many positive predictive factors in terms of not attempting suicide or homicide. Overall presentation at time of discharge is most consistent with the diagnosis of Bipolar disorder. Patient with request for discharge today. Patient has maximized benefit to be derived from acute inpatient psychiatric treatment. All members of the treatment team concur with each other in regards to plans for discharge today per patient's request.  Patient and family are aware and in agreement with discharge and discharge plan. She was discharged into care of a family friend who was aware of patient's diagnosis, treatment and need for housing and additional assistance.           LABS AND IMAGAING:    Labs Reviewed   LIPID PANEL - Abnormal; Notable for the following:        Result Value    Cholesterol, total 245 (*)     LDL, calculated 157.4 (*)     All other components within normal limits   METABOLIC PANEL, BASIC - Abnormal; Notable for the following:     Creatinine 1.03 (*)     BUN/Creatinine ratio 10 (*)     GFR est non-AA 54 (*)     All other components within normal limits   HEMOGLOBIN A1C WITH EAG   GLUCOSE, FASTING     No results found for: DS35, PHEN, PHENO, PHENT, DILF, DS39, PHENY, PTN, VALF2, VALAC, VALP, VALPR, DS6, CRBAM, CRBAMP, CARB2, XCRBAM  Admission on 02/25/2018, Discharged on 03/06/2018   Component Date Value Ref Range Status    Hemoglobin A1c 02/27/2018 4.9  4.2 - 6.3 % Final    Est. average glucose 02/27/2018 Cannot be calculated  mg/dL Final    Glucose 02/27/2018 89  65 - 100 MG/DL Final    LIPID PROFILE 02/27/2018        Final    Cholesterol, total 02/27/2018 245* <200 MG/DL Final    Triglyceride 02/27/2018 93  <150 MG/DL Final    HDL Cholesterol 02/27/2018 69  MG/DL Final    LDL, calculated 02/27/2018 157.4* 0 - 100 MG/DL Final    VLDL, calculated 02/27/2018 18.6  MG/DL Final    CHOL/HDL Ratio 02/27/2018 3.6  0 - 5.0   Final    Sodium 02/27/2018 139  136 - 145 mmol/L Final    Potassium 02/27/2018 4.0  3.5 - 5.1 mmol/L Final    Chloride 02/27/2018 108  97 - 108 mmol/L Final    CO2 02/27/2018 24  21 - 32 mmol/L Final    Anion gap 02/27/2018 7  5 - 15 mmol/L Final    Glucose 02/27/2018 86  65 - 100 mg/dL Final    BUN 02/27/2018 10  6 - 20 MG/DL Final    Creatinine 02/27/2018 1.03* 0.55 - 1.02 MG/DL Final    BUN/Creatinine ratio 02/27/2018 10* 12 - 20   Final    GFR est AA 02/27/2018 >60  >60 ml/min/1.73m2 Final    GFR est non-AA 02/27/2018 54* >60 ml/min/1.73m2 Final    Calcium 02/27/2018 8.6  8.5 - 10.1 MG/DL Final   Admission on 02/25/2018, Discharged on 02/25/2018   Component Date Value Ref Range Status    WBC 02/25/2018 4.4  3.6 - 11.0 K/uL Final    RBC 02/25/2018 3.98  3.80 - 5.20 M/uL Final    HGB 02/25/2018 13.0  11.5 - 16.0 g/dL Final    HCT 02/25/2018 38.8  35.0 - 47.0 % Final    MCV 02/25/2018 97.5  80.0 - 99.0 FL Final    MCH 02/25/2018 32.7  26.0 - 34.0 PG Final    MCHC 02/25/2018 33.5  30.0 - 36.5 g/dL Final    RDW 02/25/2018 12.9  11.5 - 14.5 % Final    PLATELET 65/10/7032 363  150 - 400 K/uL Final    MPV 02/25/2018 10.6  8.9 - 12.9 FL Final    NRBC 02/25/2018 0.0  0  WBC Final    ABSOLUTE NRBC 02/25/2018 0.00  0.00 - 0.01 K/uL Final    NEUTROPHILS 02/25/2018 72  32 - 75 % Final    LYMPHOCYTES 02/25/2018 22  12 - 49 % Final    MONOCYTES 02/25/2018 6  5 - 13 % Final    EOSINOPHILS 02/25/2018 0  0 - 7 % Final    BASOPHILS 02/25/2018 1  0 - 1 % Final    IMMATURE GRANULOCYTES 02/25/2018 0  0.0 - 0.5 % Final    ABS. NEUTROPHILS 02/25/2018 3.1  1.8 - 8.0 K/UL Final    ABS. LYMPHOCYTES 02/25/2018 1.0  0.8 - 3.5 K/UL Final    ABS. MONOCYTES 02/25/2018 0.3  0.0 - 1.0 K/UL Final    ABS. EOSINOPHILS 02/25/2018 0.0  0.0 - 0.4 K/UL Final    ABS. BASOPHILS 02/25/2018 0.0  0.0 - 0.1 K/UL Final    ABS. IMM.  GRANS. 02/25/2018 0.0  0.00 - 0.04 K/UL Final    DF 02/25/2018 AUTOMATED    Final    Sodium 02/25/2018 142  136 - 145 mmol/L Final    Potassium 02/25/2018 3.4* 3.5 - 5.1 mmol/L Final    Chloride 02/25/2018 107  97 - 108 mmol/L Final    CO2 02/25/2018 27  21 - 32 mmol/L Final    Anion gap 02/25/2018 8  5 - 15 mmol/L Final    Glucose 02/25/2018 91  65 - 100 mg/dL Final    BUN 02/25/2018 12  6 - 20 MG/DL Final    Creatinine 02/25/2018 1.14* 0.55 - 1.02 MG/DL Final    BUN/Creatinine ratio 02/25/2018 11* 12 - 20   Final    GFR est AA 02/25/2018 59* >60 ml/min/1.73m2 Final    GFR est non-AA 02/25/2018 48* >60 ml/min/1.73m2 Final    Calcium 02/25/2018 8.9  8.5 - 10.1 MG/DL Final    Bilirubin, total 02/25/2018 0.6  0.2 - 1.0 MG/DL Final    ALT (SGPT) 02/25/2018 30  12 - 78 U/L Final    AST (SGOT) 02/25/2018 22  15 - 37 U/L Final    Alk.  phosphatase 02/25/2018 40* 45 - 117 U/L Final    Protein, total 02/25/2018 7.9  6.4 - 8.2 g/dL Final    Albumin 02/25/2018 3.8  3.5 - 5.0 g/dL Final    Globulin 02/25/2018 4.1* 2.0 - 4.0 g/dL Final    A-G Ratio 02/25/2018 0.9* 1.1 - 2.2   Final    ALCOHOL(ETHYL),SERUM 02/25/2018 <10  <10 MG/DL Final    AMPHETAMINES 02/25/2018 NEGATIVE   NEG   Final    BARBITURATES 02/25/2018 NEGATIVE   NEG   Final    BENZODIAZEPINES 02/25/2018 NEGATIVE   NEG   Final    COCAINE 02/25/2018 NEGATIVE   NEG   Final    METHADONE 02/25/2018 NEGATIVE   NEG   Final    OPIATES 02/25/2018 NEGATIVE   NEG   Final    PCP(PHENCYCLIDINE) 02/25/2018 NEGATIVE   NEG   Final    THC (TH-CANNABINOL) 02/25/2018 NEGATIVE   NEG   Final    Drug screen comment 02/25/2018 (NOTE)   Final    Salicylate level 31/84/8056 <1.7* 2.8 - 20.0 MG/DL Final    Acetaminophen level 02/25/2018 <2* 10 - 30 ug/mL Final    Color 02/25/2018 YELLOW/STRAW    Final    Appearance 02/25/2018 CLEAR  CLEAR   Final    Specific gravity 02/25/2018 1.014  1.003 - 1.030   Final    pH (UA) 02/25/2018 5.5  5.0 - 8.0   Final    Protein 02/25/2018 TRACE* NEG mg/dL Final    Glucose 02/25/2018 NEGATIVE   NEG mg/dL Final    Ketone 02/25/2018 TRACE* NEG mg/dL Final    Bilirubin 02/25/2018 NEGATIVE   NEG   Final    Blood 02/25/2018 NEGATIVE   NEG   Final    Urobilinogen 02/25/2018 0.2  0.2 - 1.0 EU/dL Final    Nitrites 02/25/2018 NEGATIVE   NEG   Final    Leukocyte Esterase 02/25/2018 TRACE* NEG   Final    WBC 02/25/2018 0-4  0 - 4 /hpf Final    RBC 02/25/2018 0-5  0 - 5 /hpf Final    Epithelial cells 02/25/2018 FEW  FEW /lpf Final    Bacteria 02/25/2018 NEGATIVE   NEG /hpf Final    UA:UC IF INDICATED 02/25/2018 CULTURE NOT INDICATED BY UA RESULT  CNI   Final    Hyaline cast 02/25/2018 0-2  0 - 5 /lpf Final    TSH 02/25/2018 1.40  0.36 - 3.74 uIU/mL Final     Ct Head Wo Cont    Result Date: 2/25/2018  EXAM:  CT HEAD WO CONT INDICATION:   Confusion/delirium, altered LOC, unexplained COMPARISON: None. CONTRAST:  None. TECHNIQUE: Unenhanced CT of the head was performed using 5 mm images. Brain and bone windows were generated. CT dose reduction was achieved through use of a standardized protocol tailored for this examination and automatic exposure control for dose modulation. FINDINGS: The ventricles and sulci are normal in size, shape and configuration and midline. There is periventricular hypoattenuation. There is no intracranial hemorrhage, extra-axial collection, mass, mass effect or midline shift. The basilar cisterns are open. No acute infarct is identified. The bone windows demonstrate no abnormalities. The visualized portions of the paranasal sinuses and mastoid air cells are clear. IMPRESSION: No acute findings. DISPOSITION:    Home. Patient to f/u with psychiatric and psychotherapy appointments. Patient is to f/u with internist as directed. FOLLOW-UP CARE:    Activity as tolerated  Regular Diet  Wound Care: none needed. Follow-up Information     Follow up With Details Comments [address] on 3/7/2018 walk in for services [address] [facility] Emilie Richmond, [address]  Phone: [phone]    Phys Daniel, MD   Patient can only remember the practice name and not the physician                   PROGNOSIS:   Good --- based on nature of patient's pathology/ies and treatment compliance issues.   Prognosis is greatly dependent upon patient's ability to remain sober and to follow up with drug/etoh rehabilitation and psychiatric/psychotherapy appointments as well as to comply with psychiatric medications as prescribed. DISCHARGE MEDICATIONS:     Informed consent given for the use of following psychotropic medications:  Discharge Medication List as of 3/6/2018  1:27 PM      START taking these medications    Details   benztropine (COGENTIN) 1 mg tablet Take 1 Tab by mouth two (2) times a day. Indications: extrapyramidal disease, Normal, Disp-60 Tab, R-0      pantoprazole (PROTONIX) 40 mg tablet Take 1 Tab by mouth Daily (before breakfast). Indications: gastroesophageal reflux disease, Normal, Disp-30 Tab, R-0      risperiDONE (RISPERDAL) 4 mg tablet Take 1 Tab by mouth nightly. Indications: Jennifer associated with Bipolar Disorder, Normal, Disp-30 Tab, R-0         CONTINUE these medications which have CHANGED    Details   lisinopril (PRINIVIL, ZESTRIL) 20 mg tablet Take 1 Tab by mouth daily. Indications: hypertension, Normal, Disp-30 Tab, R-0         CONTINUE these medications which have NOT CHANGED    Details   fluticasone (FLONASE) 50 mcg/actuation nasal spray 2 Sprays by Both Nostrils route daily. , Historical Med         STOP taking these medications       predniSONE (DELTASONE) 20 mg tablet Comments:   Reason for Stopping:         omeprazole (PRILOSEC) 20 mg capsule Comments:   Reason for Stopping:                      A coordinated, multidisplinary treatment team round was conducted with Eric Elaine---this is done daily here at [facility]. This team consists of the nurse, psychiatric unit pharmcist,  and writer. I have spent greater than 35 minutes on discharge work.     Signed:  Octaviano Buckley MD  3/6/18

## 2019-01-25 ENCOUNTER — OFFICE VISIT (OUTPATIENT)
Dept: FAMILY MEDICINE CLINIC | Age: 64
End: 2019-01-25

## 2019-01-25 VITALS
DIASTOLIC BLOOD PRESSURE: 95 MMHG | WEIGHT: 158 LBS | HEIGHT: 65 IN | HEART RATE: 63 BPM | SYSTOLIC BLOOD PRESSURE: 176 MMHG | BODY MASS INDEX: 26.33 KG/M2 | TEMPERATURE: 97.5 F

## 2019-01-25 DIAGNOSIS — Z76.89 RETURN TO WORK EXAM: Primary | ICD-10-CM

## 2019-01-25 RX ORDER — LISINOPRIL 20 MG/1
20 TABLET ORAL DAILY
Qty: 30 TAB | Refills: 1 | Status: SHIPPED | OUTPATIENT
Start: 2019-01-25 | End: 2019-01-25 | Stop reason: SDUPTHER

## 2019-01-25 RX ORDER — LISINOPRIL 20 MG/1
20 TABLET ORAL DAILY
Qty: 30 TAB | Refills: 1 | Status: SHIPPED | OUTPATIENT
Start: 2019-01-25 | End: 2019-02-27 | Stop reason: SDUPTHER

## 2019-01-25 NOTE — PROGRESS NOTES
Coordination of Care 1. Have you been to the ER, urgent care clinic since your last visit? Hospitalized since your last visit? No 
 
2. Have you seen or consulted any other health care providers outside of the [address] [place] [address] since your last visit? Include any pap smears or colon screening. No 
 
Does the patient need refills? YES Learning Assessment Complete? yes Depression Screening complete in the past 12 months? yes

## 2019-01-25 NOTE — PROGRESS NOTES
Subjective: Chief Complaint Patient presents with  Physical  
  For work  Medication Refill  
 
she is a 59y.o. year old female who presents for evalution. 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 Also needs refill on lisinopril, hasn't taken for some time. Feels physically well. Chart shows that she was admitted 2/18 to [facility] for psychosis-- schizophrenia, vs schizoaffective, vs bipolar. I asked her about this and she tells me they never diagnosed her, although I see in her chart that she was sent out on risperdol. She also states she went for psych f/u and was told 'nothing is wrong, you don't need psych med'. When I asked her if I could get that record, she said that no record was made; this was at a clinic in [facility]. 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 States that the admission at [facility] was a cultural misunderstanding. Past Medical History:  
Diagnosis Date  Hypertension  Ill-defined condition GERD  
psychosis Objective:  
 
Vitals:  
 01/25/19 7185 01/25/19 7419 BP: 165/90 (!) 176/95 Pulse: 65 63 Temp: 97.5 °F (36.4 °C) TempSrc: Oral   
Weight: 158 lb (71.7 kg) Height: 5' 4.57\" (1.64 m) Physical Examination: General appearance - alert, well appearing, and in no distress. MSE-- no formal testing done. Thought processes on interview seem normal. 
 
 
Assessment/ Plan:  
 
htn-- restart lisinopril. H/o psychosis per mental health admission this year. Refer to Stuart Fierro for eval. 
I won't sign the note for work until I see Jody's psych eval. 
 
No results found for any visits on 01/25/19. Orders Placed This Encounter  lisinopril (PRINIVIL, ZESTRIL) 20 mg tablet Sig: Take 1 Tab by mouth daily. Dispense:  30 Tab Refill:  1 Follow-up Disposition: 
Return in about 4 weeks (around 2/22/2019), or appt with me. appt with sofiya Villanueva Se you try to get Asiya Underwood to work on this today? appt with bree.

## 2019-01-25 NOTE — PROGRESS NOTES
Avs discussed with Deanne Fuchs by Discharge Nurse Derrick Pace LPN. Patient verbalized understanding and has no further questions. AVS printed and given to patient Derrick Pace LPN Rx reordered to be sent t the correct pharmacy, [place]

## 2019-02-19 ENCOUNTER — OFFICE VISIT (OUTPATIENT)
Dept: FAMILY MEDICINE CLINIC | Age: 64
End: 2019-02-19

## 2019-02-19 DIAGNOSIS — F43.20 ADJUSTMENT DISORDER IN REMISSION: Primary | ICD-10-CM

## 2019-02-19 NOTE — PROGRESS NOTES
[address] [facility] had seen Dr. Maxx Rodriguez 25th requesting physical for her job. There was a  Psych note from Feb 2018 that Dr. Navarro Mayfield asked me to review with the patient to determine suitability for work based on that diagnosis. Miguel Ángel Avila is from [place] [place] [facility]". She has been in the [address] [place] [address],[address] for several years, living part of the time in [place] and the other in [place]. Her ivone is very strong and charasmatic; she feels she has a close personal relationship with God and that through vigils and particular prayer time (12:00-3:00 a.m.) he talks to her and helps her figure out what is needed of her. 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 The encounter in Feb of 2018 that led to her psych hospitalization may have been a combination of cultural misunderstanding and some misaligned Yazidi fervor on her part. In our session today, Miguel Ángel Avila was coherent; mood and affect congruent. No sign of psychosis; she did openly talk about hearing God's voice and praying for guidance and being led to what was next for her in her life. She was also able to recognize that her fervor could be misunderstood or feel threatening to others. She denies any psychotic episodes previous to the occurrence in 2018 and none since. She denied any history of depression and anxiety, drugs or alcohol. When asked about drinking vinegar, as per the note in the psych chart, she stated she drinks apple cider vinegar to help with GERD; I also noted that she was, at one time, prescribed medication for this, as well. She relies on her ivone and personal relationship with God to help her figure things out and maintain her emotional well being. 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   She stated that the physical is required annually and that all the staff are being asked to get one. She attends [employer] and feels very at home there 'because they pray and Orthodox like I do; they have fire in their souls for God.' She is not taking the psych medications prescribed for her upon discharge in 2018; she only takes Lisinopril at this time. She states that she has high blood pressure from time to time and arthritis. She is living independently in an apartment near her work and [employer] and on the [employer]. She has no family in [place], but has close friends. I did not observe any concerns for her safety or that of others. She did not, at any point in our conversation become defensive, angry, belligerent, or irrational. While her Faith and ivone related customs are outside the norm, she is not a threat to self or others at this time.

## 2019-02-27 ENCOUNTER — OFFICE VISIT (OUTPATIENT)
Dept: FAMILY MEDICINE CLINIC | Age: 64
End: 2019-02-27

## 2019-02-27 VITALS
WEIGHT: 158.8 LBS | DIASTOLIC BLOOD PRESSURE: 93 MMHG | SYSTOLIC BLOOD PRESSURE: 182 MMHG | HEART RATE: 67 BPM | TEMPERATURE: 97.4 F | BODY MASS INDEX: 26.78 KG/M2

## 2019-02-27 DIAGNOSIS — Z76.89 RETURN TO WORK EXAM: Primary | ICD-10-CM

## 2019-02-27 DIAGNOSIS — I10 ESSENTIAL HYPERTENSION: ICD-10-CM

## 2019-02-27 RX ORDER — LISINOPRIL 20 MG/1
20 TABLET ORAL DAILY
Qty: 30 TAB | Refills: 3 | Status: SHIPPED | OUTPATIENT
Start: 2019-02-27 | End: 2019-05-22 | Stop reason: SDUPTHER

## 2019-02-27 NOTE — PROGRESS NOTES
Subjective:     Chief Complaint   Patient presents with    Physical     f/u for return to work physical     she is a 59y.o. year old female who presents for evalution. 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 I declined clearing her because of ? H/o psychosis, until Betty Alcantara could evaluate her. She did and felt she is not psychotic and probably has no h/o psychosis. Needs form filled out to say she is clear of tb. Reports +PPD, [place] bcg was given at birth. Brought in record of neg cxr 2/17. Reports no sx that would suggest tb.  F/u htn. Reports she checks her bp every day at home and it is usually around 140/<90. It has been high each time here. She is taking the lisinopril daily. Past Medical History:   Diagnosis Date    Hypertension     Ill-defined condition     GERD       Objective:     Vitals:    02/27/19 0937 02/27/19 0943   BP: (!) 179/101 (!) 182/93   Pulse: 71 67   Temp: 97.4 °F (36.3 °C)    TempSrc: Oral    Weight: 158 lb 12.8 oz (72 kg)        Physical Examination: General appearance - alert, well appearing, and in no distress. Assessment/ Plan:     htn-- ?white coat element. Bring in cuff for calibration with ours next visit. Continue current dose of lisinopril and check bps at least every 2 weeks home, rescue aquad or pharmacy, bring in readings next visit. For TB form, will get tspot today. 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 No results found for any visits on 02/27/19. Orders Placed This Encounter    lisinopril (PRINIVIL, ZESTRIL) 20 mg tablet     Sig: Take 1 Tab by mouth daily.      Dispense:  30 Tab     Refill:  3           Follow-up Disposition: Not on File

## 2019-02-27 NOTE — PROGRESS NOTES
Coordination of Care  1. Have you been to the ER, urgent care clinic since your last visit? Hospitalized since your last visit? No    2. Have you seen or consulted any other health care providers outside of the [address] [place] [address] since your last visit? Include any pap smears or colon screening. No    Does the patient need refills? NO    Learning Assessment Complete?  yes  Depression Screening complete in the past 12 months? yes

## 2019-02-27 NOTE — PROGRESS NOTES
Per Dr Bradford Pi, RN completed the TB risk assessment form and the Intake Data Collection Sheet and Referral form for the T-spot test for tuberculosis. RN explained to pt that she will be notified with a letter if the test is negative, and with a call if it is positive. If it is positive, she will need to be [facility] the [facility] for evaluation and treatment. RN asked pt to wait for lab to have the T-spot done. AVS printed, reviewed with pt and given to pt. Pt has an appt with , Leia Medley, on 3/13/19, to discuss medical bills. RN copied pt's chest xray report performed on 2/23/17, and also Report of Tuberculosis Screening children's Programs form from her employer for scanning to chart. Pt expressed understanding of the above information. Rita Reid.

## 2019-03-05 ENCOUNTER — TELEPHONE (OUTPATIENT)
Dept: FAMILY MEDICINE CLINIC | Age: 64
End: 2019-03-05

## 2019-03-05 NOTE — LETTER
3/5/2019 2:21 PM 
 
Ms. Shaquille Dong [address] [facility] [id number] Dear Shaquille Dong, The test for tuberculosis was negative/normal. I have attached two copies of the results. If you have any questions please contact the [address] office at [phone]. Sincerely, Maria Fernanda Cardona RN for 
Khloe Charlton MD

## 2019-03-05 NOTE — TELEPHONE ENCOUNTER
TSPOT result received. Result negative. Result printed from the [facility] portal. Two Copies mailed to patient. Routing to Provider.

## 2019-05-22 ENCOUNTER — OFFICE VISIT (OUTPATIENT)
Dept: FAMILY MEDICINE CLINIC | Age: 64
End: 2019-05-22

## 2019-05-22 VITALS
TEMPERATURE: 98.4 F | SYSTOLIC BLOOD PRESSURE: 152 MMHG | OXYGEN SATURATION: 100 % | WEIGHT: 161.8 LBS | HEART RATE: 67 BPM | BODY MASS INDEX: 27.29 KG/M2 | DIASTOLIC BLOOD PRESSURE: 94 MMHG

## 2019-05-22 DIAGNOSIS — I10 ESSENTIAL HYPERTENSION: Primary | ICD-10-CM

## 2019-05-22 DIAGNOSIS — Z13.9 ENCOUNTER FOR SCREENING: ICD-10-CM

## 2019-05-22 DIAGNOSIS — H91.91 HEARING LOSS OF RIGHT EAR, UNSPECIFIED HEARING LOSS TYPE: ICD-10-CM

## 2019-05-22 LAB — GLUCOSE POC: NORMAL MG/DL

## 2019-05-22 RX ORDER — LORATADINE 10 MG/1
10 TABLET ORAL DAILY
Qty: 30 TAB | Refills: 3 | Status: SHIPPED | OUTPATIENT
Start: 2019-05-22 | End: 2019-08-28

## 2019-05-22 RX ORDER — LISINOPRIL 20 MG/1
20 TABLET ORAL DAILY
Qty: 30 TAB | Refills: 3 | Status: SHIPPED | OUTPATIENT
Start: 2019-05-22 | End: 2019-08-28 | Stop reason: SDUPTHER

## 2019-05-22 NOTE — PROGRESS NOTES
Subjective:     Chief Complaint   Patient presents with    Hypertension     follow up    Other     having a difficult time hearing. she is a 59y.o. year old female who presents for evalution. 1.  Hasn't been hearing well out of the right ear for 1.5 years. Seems to have tinnitus in that ear too, able to block it out pretty easily. Reports a doctor cleaned out her sinus when she first had the sx, but it didn't help. Does have nasal d/c, clear, and stuffiness of her nose. Today one of the children at her day care clapped his hand  'hard' over her left ear and now she has some trouble hearing out of that ear, no pain or d/c.  2.  F/u htn-- hasn't been checking her bps for the last several months. She thinks her bp is high at the clinic because she gets nervous here. Eats a healthy diet with a lot of fruits/veg.  3.  I discussed that her chol was quite elevated last year and that her AHA calculated risk for heart dx in 10 years is 20%. I recommended she start chol med. She reports she used to be on it, but was taken off when her cholesterol normalized, does not want to start med before getting chol checked again. Ate today. Past Medical History:   Diagnosis Date    Hypertension     Ill-defined condition     GERD       Objective:     Vitals:    05/22/19 1018 05/22/19 1019   BP: (!) 164/93 (!) 152/94   Pulse: 66 67   Temp: 98.4 °F (36.9 °C)    TempSrc: Oral    SpO2: 100%    Weight: 161 lb 12.8 oz (73.4 kg)        Physical Examination: General appearance - alert, well appearing, and in no distress and repeat bp by me 160/96, just barely fits in regular sized cuff. Ears - bilateral TM's and external ear canals normal.  Able to hear rubbed fingers normally on the left, not on the right.   Nose - boggy mucosa  Mouth - mucous membranes moist, pharynx normal without lesions  Neck - supple, no significant adenopathy, thyroid exam: thyroid is normal in size without nodules or tenderness  Chest - clear to auscultation, no wheezes, rales or rhonchi, symmetric air entry  Heart - normal rate, regular rhythm, normal S1, S2, no murmurs, rubs, clicks or gallops  Abdomen - soft, nontender, nondistended, no masses or organomegaly  Extremities - no pedal edema noted      Assessment/ Plan:     1.  Htn, ? Control. She will get a new cuff or go to the pharmacy and call me with bp readings on 2 separate days. Avoid salt, exercise. For now continue current med. 2. No TM trauma to the left ear. Poor hearing out of the right ear. tx for ETD with loratidine. If sx persist, will set up for audiologist through [facility]. 3.  Hyperlipidemia-- recheck lipids fasting. Needs statin. Results for orders placed or performed in visit on 05/22/19   AMB POC GLUCOSE BLOOD, BY GLUCOSE MONITORING DEVICE   Result Value Ref Range    Glucose POC NF 87 mg/dL       Orders Placed This Encounter    LIPID PANEL     Standing Status:   Future     Standing Expiration Date:   66/77/3857    METABOLIC PANEL, COMPREHENSIVE     Standing Status:   Future     Standing Expiration Date:   11/22/2019    AMB POC GLUCOSE BLOOD, BY GLUCOSE MONITORING DEVICE    lisinopril (PRINIVIL, ZESTRIL) 20 mg tablet     Sig: Take 1 Tab by mouth daily. Indications: high blood pressure     Dispense:  30 Tab     Refill:  3    loratadine (CLARITIN) 10 mg tablet     Sig: Take 1 Tab by mouth daily.      Dispense:  30 Tab     Refill:  3

## 2019-05-22 NOTE — PROGRESS NOTES
Coordination of Care  1. Have you been to the ER, urgent care clinic since your last visit? Hospitalized since your last visit? No    2. Have you seen or consulted any other health care providers outside of the [address] [place] [address] since your last visit? Include any pap smears or colon screening. No    Does the patient need refills?  NO    Learning Assessment Complete? yes    Results for orders placed or performed in visit on 05/22/19   AMB POC GLUCOSE BLOOD, BY GLUCOSE MONITORING DEVICE   Result Value Ref Range    Glucose POC NF 87 mg/dL

## 2019-05-29 ENCOUNTER — HOSPITAL ENCOUNTER (OUTPATIENT)
Dept: LAB | Age: 64
Discharge: HOME | End: 2019-05-29

## 2019-05-29 ENCOUNTER — LAB ONLY (OUTPATIENT)
Dept: FAMILY MEDICINE CLINIC | Age: 64
End: 2019-05-29

## 2019-05-29 DIAGNOSIS — Z13.9 ENCOUNTER FOR SCREENING: ICD-10-CM

## 2019-05-29 LAB
ALBUMIN SERPL-MCNC: 3.7 G/DL (ref 3.5–5)
ALBUMIN/GLOB SERPL: 1 {RATIO} (ref 1.1–2.2)
ALP SERPL-CCNC: 49 U/L (ref 45–117)
ALT SERPL-CCNC: 24 U/L (ref 12–78)
ANION GAP SERPL CALC-SCNC: 6 MMOL/L (ref 5–15)
AST SERPL-CCNC: 21 U/L (ref 15–37)
BILIRUB SERPL-MCNC: 0.5 MG/DL (ref 0.2–1)
BUN SERPL-MCNC: 17 MG/DL (ref 6–20)
BUN/CREAT SERPL: 17 (ref 12–20)
CALCIUM SERPL-MCNC: 8.6 MG/DL (ref 8.5–10.1)
CHLORIDE SERPL-SCNC: 108 MMOL/L (ref 97–108)
CHOLEST SERPL-MCNC: 187 MG/DL
CO2 SERPL-SCNC: 28 MMOL/L (ref 21–32)
CREAT SERPL-MCNC: 1.02 MG/DL (ref 0.55–1.02)
GLOBULIN SER CALC-MCNC: 3.6 G/DL (ref 2–4)
GLUCOSE SERPL-MCNC: 84 MG/DL (ref 65–100)
HDLC SERPL-MCNC: 69 MG/DL
HDLC SERPL: 2.7 {RATIO} (ref 0–5)
LDLC SERPL CALC-MCNC: 107.2 MG/DL (ref 0–100)
LIPID PROFILE,FLP: ABNORMAL
POTASSIUM SERPL-SCNC: 4.2 MMOL/L (ref 3.5–5.1)
PROT SERPL-MCNC: 7.3 G/DL (ref 6.4–8.2)
SODIUM SERPL-SCNC: 142 MMOL/L (ref 136–145)
TRIGL SERPL-MCNC: 54 MG/DL (ref ?–150)
VLDLC SERPL CALC-MCNC: 10.8 MG/DL

## 2019-05-29 PROCEDURE — 80053 COMPREHEN METABOLIC PANEL: CPT

## 2019-05-29 PROCEDURE — 80061 LIPID PANEL: CPT

## 2019-05-29 NOTE — PROGRESS NOTES
Patient came in today fasting for a lab only visit per Dr. Carol Bush. Lipid and CMP were were drawn from [facility] without complications. Results pending.

## 2019-08-07 ENCOUNTER — HOSPITAL ENCOUNTER (OUTPATIENT)
Dept: MAMMOGRAPHY | Age: 64
Discharge: HOME | End: 2019-08-07

## 2019-08-07 ENCOUNTER — HOSPITAL ENCOUNTER (OUTPATIENT)
Dept: LAB | Age: 64
Discharge: HOME | End: 2019-08-07

## 2019-08-07 ENCOUNTER — OFFICE VISIT (OUTPATIENT)
Dept: FAMILY PLANNING/WOMEN'S HEALTH CLINIC | Age: 64
End: 2019-08-07

## 2019-08-07 VITALS — SYSTOLIC BLOOD PRESSURE: 149 MMHG | DIASTOLIC BLOOD PRESSURE: 88 MMHG

## 2019-08-07 DIAGNOSIS — Z12.31 VISIT FOR SCREENING MAMMOGRAM: ICD-10-CM

## 2019-08-07 DIAGNOSIS — Z01.419 ENCOUNTER FOR WELL WOMAN EXAM: Primary | ICD-10-CM

## 2019-08-07 PROCEDURE — 88175 CYTOPATH C/V AUTO FLUID REDO: CPT

## 2019-08-07 PROCEDURE — 77067 SCR MAMMO BI INCL CAD: CPT

## 2019-08-07 NOTE — PROGRESS NOTES
EVERY WOMANS LIFE HISTORY QUESTIONNAIRE       No Yes Comments   Has a doctor ever seen or felt anything wrong with your breast? [x]                                  []                                     Have you ever had a breast biopsy? [x]                                  []                                          When and where was last mammogram performed? 11/2016 in [place]    Have you ever been told that there was a problem on your mammogram?   No Yes Comments   [x]                                  []                                       Do you have breast implants? No Yes Comments   [x]                                  []                                       When was your last Pap test performed? 11/2016 in [place]    Have you ever had an abnormal Pap test?   No Yes Comments   [x]                                  []                                       Have you had a hysterectomy? No Yes Comments (why)   [x]                                  []                                       Have you been through menopause? No Yes Date of LMP   []                                  [x]                                  2014     Did your mother take MONICA? No Yes Unknown   [x]                                  []                                       Do you have a history of HIV exposure? No Yes    [x]                                  []                                       Have you ever been diagnosed with any type of Cancer   No Yes Comments (type,when,where,type of treatment   [x]                                  []                                          Has a family member been diagnosed with breast or ovarian cancer?    No Yes Comments (which family members, and type   []                                  [x]                                  Mother with breast cancer at age 68     Are you taking hormone replacement therapy (HRT)     No Yes Comments   [x]                                  [] How many times have you been pregnant? 7     Number of live births ? 5    Are you experiencing any of the following? No Yes Comments   Nipple Discharge [x]                                  []                                     Breast Lump/Masses [x]                                  []                                     Breast Skin Changes [x]                                  []                                          No Yes Comments   Vaginal Discharge [x]                                  []                                     Abnormal/unusual vaginal bleeding [x]                                  []                                         Are you experiencing any other health problems? HTN--goes to the [facility]        Age at first period?  16  Age at first birth? 22    Ht--5' 6\"    Wt--172

## 2019-08-07 NOTE — PROGRESS NOTES
Assessment/Plan:    Diagnoses and all orders for this visit:    1. Encounter for well woman exam            MAYTE Sánchez expressed understanding of this plan. An AVS was printed and given to the patient.      ----------------------------------------------------------------------    Chief Complaint   Patient presents with    Well Woman     [facility] visit       History of Present Illness:  61 yo   Menopause around 5 years ago she states  , not in a relationship  Here for annual well woman exam  Has had a rash around her groin she states, she went to the pharmacy and picked up OTC cream which has helped. States it is \"itchy\" at times  Last pap and mammo \"around 4 years ago\"  Has never had a colonoscopy and agrees to rectal exam with heme guaiac testing today, this was explained to her before the exam   No breast or pelvic issues       Past Medical History:   Diagnosis Date    Hypertension     Ill-defined condition     GERD       Current Outpatient Medications   Medication Sig Dispense Refill    lisinopril (PRINIVIL, ZESTRIL) 20 mg tablet Take 1 Tab by mouth daily. Indications: high blood pressure 30 Tab 3    loratadine (CLARITIN) 10 mg tablet Take 1 Tab by mouth daily. 30 Tab 3       No Known Allergies    Social History     Tobacco Use    Smoking status: Never Smoker    Smokeless tobacco: Never Used   Substance Use Topics    Alcohol use: No    Drug use: Not on file       No family history on file.     Physical Exam:     Visit Vitals  /88   LMP 2014 (Approximate)       A&Ox3  WDWN NAD  Respirations normal and non labored  Breast exam- marley neg for mass, tenderness, skin color changes, dimpling or retractions  Pelvic exam- clitoral hackett is not present and there appears to be minimal erosion on the clitoris superficially (I suspected that she might have had genital surgery but she states no)  Severe post menopausal atrophy present, pap done on cervix, no lesion or discharge  Uterus and adnexal exam neg for mass or tenderness  Rectal exam neg for heme guaiac and no masses in rectum

## 2019-08-21 ENCOUNTER — TELEPHONE (OUTPATIENT)
Dept: FAMILY PLANNING/WOMEN'S HEALTH CLINIC | Age: 64
End: 2019-08-21

## 2019-08-21 NOTE — TELEPHONE ENCOUNTER
Patient pap result letter returned. Left message to let her know and told her to call our office for the result.

## 2019-08-21 NOTE — TELEPHONE ENCOUNTER
Patient returned call and I told her the result of her pap test.  We did not have her apt # listed so I added it to her demographics in CC.

## 2019-08-28 ENCOUNTER — OFFICE VISIT (OUTPATIENT)
Dept: FAMILY MEDICINE CLINIC | Age: 64
End: 2019-08-28

## 2019-08-28 VITALS
TEMPERATURE: 97.3 F | DIASTOLIC BLOOD PRESSURE: 90 MMHG | WEIGHT: 162 LBS | SYSTOLIC BLOOD PRESSURE: 149 MMHG | HEART RATE: 58 BPM | HEIGHT: 65 IN | BODY MASS INDEX: 26.99 KG/M2

## 2019-08-28 DIAGNOSIS — H91.93 BILATERAL HEARING LOSS, UNSPECIFIED HEARING LOSS TYPE: Primary | ICD-10-CM

## 2019-08-28 RX ORDER — AMOXICILLIN 500 MG/1
500 CAPSULE ORAL 3 TIMES DAILY
Qty: 30 CAP | Refills: 0 | Status: SHIPPED | OUTPATIENT
Start: 2019-08-28 | End: 2019-09-07

## 2019-08-28 RX ORDER — CETIRIZINE HCL 10 MG
10 TABLET ORAL
Qty: 30 TAB | Refills: 5 | Status: SHIPPED | OUTPATIENT
Start: 2019-08-28 | End: 2020-09-10

## 2019-08-28 RX ORDER — LISINOPRIL 20 MG/1
20 TABLET ORAL DAILY
Qty: 30 TAB | Refills: 11 | Status: SHIPPED | OUTPATIENT
Start: 2019-08-28 | End: 2020-09-04

## 2019-08-28 NOTE — PROGRESS NOTES
At discharge station AVS was printed and reviewed with pt. Explained to pt  that Gomez Lacey or Bianca Cabral will be meeting with them to complete application for [facility] referral to see Otolaryngology  . I told pt that they must go to registration to make appointment with Gomez Lacey or Bianca Cabral.  Sushil Jones RN

## 2019-08-28 NOTE — PROGRESS NOTES
Subjective:     Chief Complaint   Patient presents with    Hypertension     f/u, med refill     she is a 62y.o. year old female who presents for evalution. 1.  Recheck bp. Feeling well, bps at home 140/80s usually. Taking med as directed. I discussed test results from last visit, good lipids, nl cmp.  2.  Ears continue to bother her-- the left 'feels funny' ever since last visit, and the right she continues to not be able to hear out of. Says the right 'opens up' so she can hear from time to time. Taking the loratidine. Denies sore throat, nasal congestion/d/c, itchy ears, cough. Past Medical History:   Diagnosis Date    Hypertension     Ill-defined condition     GERD             Objective:     Vitals:    08/28/19 1145   BP: 149/90   Pulse: (!) 58   Temp: 97.3 °F (36.3 °C)   TempSrc: Oral   Weight: 162 lb (73.5 kg)   Height: 5' 4.57\" (1.64 m)       Physical Examination: General appearance - alert, well appearing, and in no distress  Ears - left TM is injected, retracted and opaque, the right appears nl. Hears rubbed fingers left ear, not on the right. Nose - turbinates swollen  Mouth - mucous membranes moist, pharynx normal without lesions  Neck - supple, no significant adenopathy, carotids upstroke normal bilaterally, no bruits, thyroid exam: thyroid is normal in size without nodules or tenderness  Chest - clear to auscultation, no wheezes, rales or rhonchi, symmetric air entry  Heart - normal rate, regular rhythm, normal S1, S2, no murmurs, rubs, clicks or gallops  Extremities - peripheral pulses normal, no pedal edema, no clubbing or cyanosis      Assessment/ Plan:   Htn, borderline control. Has ALBERTO diet already. Recommended increased exercise and vegetables, fruits. .continue current med. Left OM-- amox. Possible ETD vs sensory hearing loss-- change allergy med, refer to ENT. Orders Placed This Encounter    lisinopril (PRINIVIL, ZESTRIL) 20 mg tablet     Sig: Take 1 Tab by mouth daily. Indications: high blood pressure     Dispense:  30 Tab     Refill:  11    cetirizine (ZYRTEC) 10 mg tablet     Sig: Take 1 Tab by mouth nightly. For allergies, instead of loratidine. Dispense:  30 Tab     Refill:  5    amoxicillin (AMOXIL) 500 mg capsule     Sig: Take 1 Cap by mouth three (3) times daily for 10 days. For left ear infection.      Dispense:  30 Cap     Refill:  0

## 2019-08-28 NOTE — PROGRESS NOTES
Coordination of Care  1. Have you been to the ER, urgent care clinic since your last visit? Hospitalized since your last visit? No    2. Have you seen or consulted any other health care providers outside of the [address] [place] [address] since your last visit? Include any pap smears or colon screening. No    Does the patient need refills? YES    Learning Assessment Complete?  yes  Depression Screening complete in the past 12 months? yes

## 2019-09-05 NOTE — PROGRESS NOTES
mammo is negative and done through [facility]. On mammo, it is noted that she has vascular calcifications. This was not discussed with the pt. Please have her scheduled for f/up to discuss this, she should go to the ER if she develops chest pain. The appt can be next month if this is the first available.  Thank you

## 2019-09-11 ENCOUNTER — OFFICE VISIT (OUTPATIENT)
Dept: FAMILY MEDICINE CLINIC | Age: 64
End: 2019-09-11

## 2019-09-11 DIAGNOSIS — Z71.89 COUNSELING AND COORDINATION OF CARE: Primary | ICD-10-CM

## 2019-09-12 ENCOUNTER — TELEPHONE (OUTPATIENT)
Dept: FAMILY MEDICINE CLINIC | Age: 64
End: 2019-09-12

## 2019-09-12 NOTE — TELEPHONE ENCOUNTER
Sending a message to call back registration to please schedule pt for this one month follow up per Renee Dumas's note below. Julia Quezada RN

## 2019-11-14 ENCOUNTER — TELEPHONE (OUTPATIENT)
Dept: FAMILY MEDICINE CLINIC | Age: 64
End: 2019-11-14

## 2019-11-14 NOTE — TELEPHONE ENCOUNTER
Suly Vila had a Ear apt this week and apparently [facility] is requesting medical records. A representative  Name Jessie Mckenzie call main office from a Doctors office saying that patient did not show up to her apt  And that any question she can be reach [phone] Ext:[phone].     Thank you

## 2019-12-16 NOTE — PROGRESS NOTES
Problem: Psychosis  Goal: *STG: Remains safe in hospital  Outcome: Progressing Towards Goal  Pt resting in bed. Remains on q15 min checks and [place] Precautions for safety. Will continue to monitor and assess pt. No

## 2020-09-02 PROCEDURE — 99282 EMERGENCY DEPT VISIT SF MDM: CPT

## 2020-09-03 ENCOUNTER — APPOINTMENT (OUTPATIENT)
Dept: CT IMAGING | Age: 65
DRG: 885 | End: 2020-09-03
Attending: INTERNAL MEDICINE
Payer: SUBSIDIZED

## 2020-09-03 ENCOUNTER — HOSPITAL ENCOUNTER (INPATIENT)
Age: 65
LOS: 1 days | Discharge: PSYCHIATRIC OR SUBSTANCE USE FACILITY | DRG: 885 | End: 2020-09-04
Attending: EMERGENCY MEDICINE | Admitting: INTERNAL MEDICINE
Payer: SUBSIDIZED

## 2020-09-03 DIAGNOSIS — E87.20 METABOLIC ACIDOSIS: ICD-10-CM

## 2020-09-03 DIAGNOSIS — F29 PSYCHOSIS, UNSPECIFIED PSYCHOSIS TYPE (HCC): Primary | ICD-10-CM

## 2020-09-03 PROBLEM — R41.0 ACUTE DELIRIUM: Status: ACTIVE | Noted: 2020-09-03

## 2020-09-03 LAB
ALBUMIN SERPL-MCNC: 4.4 G/DL (ref 3.5–5)
ALBUMIN/GLOB SERPL: 0.8 {RATIO} (ref 1.1–2.2)
ALP SERPL-CCNC: 52 U/L (ref 45–117)
ALT SERPL-CCNC: 30 U/L (ref 12–78)
AMMONIA PLAS-SCNC: <10 UMOL/L
ANION GAP SERPL CALC-SCNC: 20 MMOL/L (ref 5–15)
APTT PPP: 29 SEC (ref 22.1–32)
ARTERIAL PATENCY WRIST A: ABNORMAL
AST SERPL-CCNC: 31 U/L (ref 15–37)
BASE DEFICIT BLD-SCNC: 18 MMOL/L
BASOPHILS # BLD: 0 K/UL (ref 0–0.1)
BASOPHILS NFR BLD: 1 % (ref 0–1)
BDY SITE: ABNORMAL
BILIRUB SERPL-MCNC: 0.6 MG/DL (ref 0.2–1)
BUN SERPL-MCNC: 14 MG/DL (ref 6–20)
BUN/CREAT SERPL: 10 (ref 12–20)
CA-I BLD-SCNC: 1.27 MMOL/L (ref 1.12–1.32)
CALCIUM SERPL-MCNC: 9.7 MG/DL (ref 8.5–10.1)
CHLORIDE SERPL-SCNC: 109 MMOL/L (ref 97–108)
CO2 SERPL-SCNC: 12 MMOL/L (ref 21–32)
COMMENT, HOLDF: NORMAL
COVID-19, XGCOVT: NOT DETECTED
CREAT SERPL-MCNC: 1.46 MG/DL (ref 0.55–1.02)
D DIMER PPP FEU-MCNC: 0.68 MG/L FEU (ref 0–0.65)
DIFFERENTIAL METHOD BLD: ABNORMAL
EOSINOPHIL # BLD: 0.1 K/UL (ref 0–0.4)
EOSINOPHIL NFR BLD: 2 % (ref 0–7)
ERYTHROCYTE [DISTWIDTH] IN BLOOD BY AUTOMATED COUNT: 11.9 % (ref 11.5–14.5)
ETHANOL SERPL-MCNC: <10 MG/DL
FERRITIN SERPL-MCNC: 248 NG/ML (ref 8–252)
FIBRINOGEN PPP-MCNC: 343 MG/DL (ref 200–475)
FOLATE SERPL-MCNC: 62.2 NG/ML (ref 5–21)
GAS FLOW.O2 O2 DELIVERY SYS: ABNORMAL L/MIN
GLOBULIN SER CALC-MCNC: 5.2 G/DL (ref 2–4)
GLUCOSE SERPL-MCNC: 91 MG/DL (ref 65–100)
HCO3 BLD-SCNC: 9.8 MMOL/L (ref 22–26)
HCT VFR BLD AUTO: 50.3 % (ref 35–47)
HEALTH STATUS, XMCV2T: NORMAL
HGB BLD-MCNC: 15.4 G/DL (ref 11.5–16)
IMM GRANULOCYTES # BLD AUTO: 0 K/UL (ref 0–0.04)
IMM GRANULOCYTES NFR BLD AUTO: 0 % (ref 0–0.5)
INR PPP: 1 (ref 0.9–1.1)
LACTATE SERPL-SCNC: 1 MMOL/L (ref 0.4–2)
LDH SERPL L TO P-CCNC: 326 U/L (ref 81–246)
LIPASE SERPL-CCNC: 84 U/L (ref 73–393)
LYMPHOCYTES # BLD: 1.2 K/UL (ref 0.8–3.5)
LYMPHOCYTES NFR BLD: 28 % (ref 12–49)
MAGNESIUM SERPL-MCNC: 2 MG/DL (ref 1.6–2.4)
MCH RBC QN AUTO: 33.4 PG (ref 26–34)
MCHC RBC AUTO-ENTMCNC: 30.6 G/DL (ref 30–36.5)
MCV RBC AUTO: 109.1 FL (ref 80–99)
MONOCYTES # BLD: 0.4 K/UL (ref 0–1)
MONOCYTES NFR BLD: 9 % (ref 5–13)
NEUTS SEG # BLD: 2.5 K/UL (ref 1.8–8)
NEUTS SEG NFR BLD: 60 % (ref 32–75)
NRBC # BLD: 0 K/UL (ref 0–0.01)
NRBC BLD-RTO: 0 PER 100 WBC
O2/TOTAL GAS SETTING VFR VENT: 0.21 %
PCO2 BLD: 23 MMHG (ref 35–45)
PH BLD: 7.24 [PH] (ref 7.35–7.45)
PHOSPHATE SERPL-MCNC: 2.6 MG/DL (ref 2.6–4.7)
PLATELET # BLD AUTO: 95 K/UL (ref 150–400)
PO2 BLD: 22 MMHG (ref 80–100)
POTASSIUM SERPL-SCNC: 3.2 MMOL/L (ref 3.5–5.1)
PROT SERPL-MCNC: 9.6 G/DL (ref 6.4–8.2)
PROTHROMBIN TIME: 10.8 SEC (ref 9–11.1)
RBC # BLD AUTO: 4.61 M/UL (ref 3.8–5.2)
RBC MORPH BLD: ABNORMAL
RBC MORPH BLD: ABNORMAL
SAMPLES BEING HELD,HOLD: NORMAL
SAO2 % BLD: 29 % (ref 92–97)
SODIUM SERPL-SCNC: 141 MMOL/L (ref 136–145)
SOURCE, COVRS: NORMAL
SPECIMEN SOURCE, FCOV2M: NORMAL
SPECIMEN TYPE, XMCV1T: NORMAL
SPECIMEN TYPE: ABNORMAL
THERAPEUTIC RANGE,PTTT: NORMAL SECS (ref 58–77)
TROPONIN I SERPL-MCNC: <0.05 NG/ML
TSH SERPL DL<=0.05 MIU/L-ACNC: 3.45 UIU/ML (ref 0.36–3.74)
VIT B12 SERPL-MCNC: 1916 PG/ML (ref 193–986)
WBC # BLD AUTO: 4.2 K/UL (ref 3.6–11)

## 2020-09-03 PROCEDURE — 83735 ASSAY OF MAGNESIUM: CPT

## 2020-09-03 PROCEDURE — 74011250637 HC RX REV CODE- 250/637: Performed by: INTERNAL MEDICINE

## 2020-09-03 PROCEDURE — 82140 ASSAY OF AMMONIA: CPT

## 2020-09-03 PROCEDURE — 70450 CT HEAD/BRAIN W/O DYE: CPT

## 2020-09-03 PROCEDURE — 84100 ASSAY OF PHOSPHORUS: CPT

## 2020-09-03 PROCEDURE — 36415 COLL VENOUS BLD VENIPUNCTURE: CPT

## 2020-09-03 PROCEDURE — 65660000000 HC RM CCU STEPDOWN

## 2020-09-03 PROCEDURE — 82728 ASSAY OF FERRITIN: CPT

## 2020-09-03 PROCEDURE — 84443 ASSAY THYROID STIM HORMONE: CPT

## 2020-09-03 PROCEDURE — 87635 SARS-COV-2 COVID-19 AMP PRB: CPT

## 2020-09-03 PROCEDURE — 83615 LACTATE (LD) (LDH) ENZYME: CPT

## 2020-09-03 PROCEDURE — 83690 ASSAY OF LIPASE: CPT

## 2020-09-03 PROCEDURE — 82746 ASSAY OF FOLIC ACID SERUM: CPT

## 2020-09-03 PROCEDURE — 80053 COMPREHEN METABOLIC PANEL: CPT

## 2020-09-03 PROCEDURE — 74011250636 HC RX REV CODE- 250/636: Performed by: EMERGENCY MEDICINE

## 2020-09-03 PROCEDURE — 83605 ASSAY OF LACTIC ACID: CPT

## 2020-09-03 PROCEDURE — 85379 FIBRIN DEGRADATION QUANT: CPT

## 2020-09-03 PROCEDURE — 85730 THROMBOPLASTIN TIME PARTIAL: CPT

## 2020-09-03 PROCEDURE — 85025 COMPLETE CBC W/AUTO DIFF WBC: CPT

## 2020-09-03 PROCEDURE — 80307 DRUG TEST PRSMV CHEM ANLYZR: CPT

## 2020-09-03 PROCEDURE — 82607 VITAMIN B-12: CPT

## 2020-09-03 PROCEDURE — 82803 BLOOD GASES ANY COMBINATION: CPT

## 2020-09-03 PROCEDURE — 85384 FIBRINOGEN ACTIVITY: CPT

## 2020-09-03 PROCEDURE — 74011250636 HC RX REV CODE- 250/636: Performed by: INTERNAL MEDICINE

## 2020-09-03 PROCEDURE — 85610 PROTHROMBIN TIME: CPT

## 2020-09-03 PROCEDURE — 74011000250 HC RX REV CODE- 250: Performed by: FAMILY MEDICINE

## 2020-09-03 PROCEDURE — 84484 ASSAY OF TROPONIN QUANT: CPT

## 2020-09-03 RX ORDER — POLYETHYLENE GLYCOL 3350 17 G/17G
17 POWDER, FOR SOLUTION ORAL DAILY PRN
Status: DISCONTINUED | OUTPATIENT
Start: 2020-09-03 | End: 2020-09-04 | Stop reason: HOSPADM

## 2020-09-03 RX ORDER — SODIUM CHLORIDE 0.9 % (FLUSH) 0.9 %
5-40 SYRINGE (ML) INJECTION EVERY 8 HOURS
Status: DISCONTINUED | OUTPATIENT
Start: 2020-09-03 | End: 2020-09-04 | Stop reason: HOSPADM

## 2020-09-03 RX ORDER — POTASSIUM CHLORIDE 750 MG/1
40 TABLET, FILM COATED, EXTENDED RELEASE ORAL
Status: COMPLETED | OUTPATIENT
Start: 2020-09-03 | End: 2020-09-03

## 2020-09-03 RX ORDER — ONDANSETRON 2 MG/ML
4 INJECTION INTRAMUSCULAR; INTRAVENOUS
Status: DISCONTINUED | OUTPATIENT
Start: 2020-09-03 | End: 2020-09-04 | Stop reason: HOSPADM

## 2020-09-03 RX ORDER — CETIRIZINE HCL 10 MG
10 TABLET ORAL
Status: DISCONTINUED | OUTPATIENT
Start: 2020-09-03 | End: 2020-09-04 | Stop reason: HOSPADM

## 2020-09-03 RX ORDER — SODIUM CHLORIDE, SODIUM LACTATE, POTASSIUM CHLORIDE, CALCIUM CHLORIDE 600; 310; 30; 20 MG/100ML; MG/100ML; MG/100ML; MG/100ML
150 INJECTION, SOLUTION INTRAVENOUS CONTINUOUS
Status: DISCONTINUED | OUTPATIENT
Start: 2020-09-03 | End: 2020-09-03

## 2020-09-03 RX ORDER — ACETAMINOPHEN 650 MG/1
650 SUPPOSITORY RECTAL
Status: DISCONTINUED | OUTPATIENT
Start: 2020-09-03 | End: 2020-09-04 | Stop reason: HOSPADM

## 2020-09-03 RX ORDER — HYDRALAZINE HYDROCHLORIDE 20 MG/ML
10 INJECTION INTRAMUSCULAR; INTRAVENOUS
Status: DISCONTINUED | OUTPATIENT
Start: 2020-09-03 | End: 2020-09-04 | Stop reason: HOSPADM

## 2020-09-03 RX ORDER — SODIUM BICARBONATE IN D5W 150/1000ML
PLASTIC BAG, INJECTION (ML) INTRAVENOUS CONTINUOUS
Status: DISCONTINUED | OUTPATIENT
Start: 2020-09-03 | End: 2020-09-04

## 2020-09-03 RX ORDER — ACETAMINOPHEN 325 MG/1
650 TABLET ORAL
Status: DISCONTINUED | OUTPATIENT
Start: 2020-09-03 | End: 2020-09-04 | Stop reason: HOSPADM

## 2020-09-03 RX ORDER — PROMETHAZINE HYDROCHLORIDE 25 MG/1
12.5 TABLET ORAL
Status: DISCONTINUED | OUTPATIENT
Start: 2020-09-03 | End: 2020-09-04 | Stop reason: HOSPADM

## 2020-09-03 RX ORDER — SODIUM CHLORIDE 0.9 % (FLUSH) 0.9 %
5-40 SYRINGE (ML) INJECTION AS NEEDED
Status: DISCONTINUED | OUTPATIENT
Start: 2020-09-03 | End: 2020-09-04 | Stop reason: HOSPADM

## 2020-09-03 RX ADMIN — Medication 10 ML: at 07:33

## 2020-09-03 RX ADMIN — POTASSIUM CHLORIDE 40 MEQ: 750 TABLET, FILM COATED, EXTENDED RELEASE ORAL at 10:00

## 2020-09-03 RX ADMIN — Medication 10 ML: at 18:24

## 2020-09-03 RX ADMIN — HYDRALAZINE HYDROCHLORIDE 10 MG: 20 INJECTION INTRAMUSCULAR; INTRAVENOUS at 09:50

## 2020-09-03 RX ADMIN — CETIRIZINE HYDROCHLORIDE 10 MG: 10 TABLET, FILM COATED ORAL at 21:04

## 2020-09-03 RX ADMIN — Medication 10 ML: at 21:05

## 2020-09-03 RX ADMIN — SODIUM BICARBONATE 150 MEQ/1,000 ML IN DEXTROSE 5 % INTRAVENOUS: SOLUTION at 17:56

## 2020-09-03 RX ADMIN — SODIUM CHLORIDE, SODIUM LACTATE, POTASSIUM CHLORIDE, AND CALCIUM CHLORIDE 150 ML/HR: 600; 310; 30; 20 INJECTION, SOLUTION INTRAVENOUS at 09:59

## 2020-09-03 RX ADMIN — SODIUM CHLORIDE 1000 ML: 9 INJECTION, SOLUTION INTRAVENOUS at 05:54

## 2020-09-03 NOTE — BSMART NOTE
Comprehensive Assessment Form Part 1 Section I - Disposition Axis I - Psychosis Axis II - Deferred Axis III - Past Medical History Date  Comments Hypertension [I10] Ill-defined condition [place] IV - Unstable housing [place] V - The Medical Doctor to Psychiatrist conference was not completed. The Medical Doctor is in agreement with Psychiatrist disposition because of (reason) patient consents to voluntary admission. The plan is admit. Naomi Benjamin The on-call Psychiatrist consulted was Dr. Romeo Miller. The admitting Psychiatrist will be Dr. Romeo Miller. The admitting Diagnosis is Psychosis. The Payor source is . The name of the representative was . This was approved for  days. The authorization number is . Section II - Integrated Summary Summary:  Patient is 60year old female reporting to ED Patient arrives ambulatory from home. Pt states she wants a medication to help her sleep because she has been busy with [employer] work. Patient is a poor historian, it is difficult to get additional information from her. Denies any other complaints this evening. Patient's friend pulled this RN aside and said that patient has been having strange behavior, such as not sleeping, eating. Friend states she hears noises that are not there, and seems paranoid that people are watching her. This patient was observed in initially sitting in najera bed with friend at bedside comfortably and then laid down. Patient then raised up chanting something unknown loudly, patient then began throwing blood pressure cuff, mask and then attempted to throw blanket but was stopped by nurse and officer. Patient then placed in room. While in room patient was seen at bedside by this writer and was calm during assessment. The patient appeared unfocused as the writer had to repeat questions.  The patient acknowledged she was at [facility]. Patient reported coming to ED because she has not been sleeping over the past 5-6 days. Patient reported she has been doing [email or web address]. Patient denied suicidal, homicidal thoughts. When asked about hallucinations patient stated she hears God. When asked what he says she stated he says \"do it\". Patient reiterated she has been doing [email or web address] since this morning. Patient denied psychiatrist and taking medications. Patient reported that she would like to sleep. Patient reported she lives with her friend currently, reported she feels good. Patient acknowledged previous admission in 2018 for mental health. Patient has five children, no substance abuse reported. Patient's friend reported bringing her to ED because they saw that she needed some attention as reported by friend she has been responding to things that are not there. Friend also acknowledged she has not been eating and sleeping as reported  The patient had some carrot juice today. As reported patient was staying another place doing mission work as reported and she told them that she was hearing things and bad spirits were there. Friend reported patient told her that she was scared at mission place and people were doing which craft. They asked her to leave so she showed up at friend's house. As she told the friend she was on assignment for her when she came to the home she made her place in room to sleep on bed but patient stayed in living room. As reported she was jumping around today without reason. Friend reported patient stated she had a cleaning to do and took things out of the kitchen, her husbands tools and went in friend's bedroom and took things out put in trash bag in front yard. As reported she also took some items from her children. Friend reported her  tried to talk with her due to her not being home at the time and she would not speak to him stated wait until your wife gets here and when friend arrived patient would not talk to her.  As reported by another friend they believe she hasnot been taking her medications. Friend reported she has been singing [employer] songs and the shouting was in her native language which friend does not know. The patienthas demonstrated mental capacity to provide informed consent. The information is given by the patient and caregiver / friend. The Chief Complaint is bizarre behavior, hallucinations. The Precipitant Factors are poss bile medication non compliance. Previous Hospitalizations: yes 2018 The patient has not previously been in restraints. Current Psychiatrist and/or  is unknown. Lethality Assessment: 
 
The potential for suicide noted by the following: not noted . The potential for homicide is not noted. The patient has not been a perpetrator of sexual or physical abuse. There are not pending charges. The patient is felt to be at risk for self harm or harm to others. The attending nurse was advised not noted. Section III - Psychosocial 
The patient's overall mood and attitude is low mood, calm and cooperative with this writer. Feelings of helplessness and hopelessness are not observed. Generalized anxiety is not observed. Panic is not observed. Phobias are not observed. Obsessive compulsive tendencies are not observed. Section IV - Mental Status Exam 
The patient's appearance shows no evidence of impairment. The patient's behavior is guarded. The patient is only aware of  time, place and person. The patient's speech shows no evidence of impairment. The patient's mood is withdrawn. The range of affect is constricted. The patient's thought content demonstrates paranoia. The thought process shows no evidence of impairment. The patient's perception demonstrated changes in the following:  hallucinations. The patient's memory shows no evidence of impairment. The patient's appetite shows no evidence of impairment. The patient's sleep has evidence of insomnia.  The patient shows little insight. The patient's judgement is psychologically impaired. Section V - Substance Abuse The patient is not using substances. The patient is using not noted. The patient has experienced the following withdrawal symptoms: N/A. Section VI - Living Arrangements The patient is single. The patient lives friend. The patient has children (5). The patient does plan to return home upon discharge. The patient does not have legal issues pending. The patient's source of income comes from unknown. Spiritism and cultural practices have not been voiced at this time. The patient's greatest support comes from friend and this person will be involved with the treatment. The patient has not been in an event described as horrible or outside the realm of ordinary life experience either currently or in the past. 
The patient has not been a victim of sexual/physical abuse. Section VII - Other Areas of Clinical Concern The highest grade achieved is unknown with the overall quality of school experience being described as unknown. The patient is currently unemployed and speaks [place] as a primary language. The patient has no communication impairments affecting communication. The patient's preference for learning can be described as: can read and write adequately.   The patient's hearing is normal.  The patient's vision is normal. 
 
 
Mulu Hadley MA

## 2020-09-03 NOTE — H&P
[address]   HISTORY AND PHYSICAL    Name:  Derrick Bello  MR#:  [id number]  :  1955  ACCOUNT #:  [de-identified]  ADMIT DATE:  2020      The patient was seen, evaluated and admitted by me on 2020. PRIMARY CARE PHYSICIAN:  MADDI Smith    SOURCE OF INFORMATION:  ED and old medical records. CHIEF COMPLAINT:  Bizarre behavior. HISTORY OF PRESENT ILLNESS:  This is a 81-year-old woman with past medical history significant for psychosis and hypertension, who was in her usual state of health until the day of her presentation at the emergency room when it was reported that the patient developed bizarre behavior at the home where the patient resides. According to report, the patient resides at home with group of other people. Because of the bizarre behavior, the patient was kicked out of the house. It was also reported that the patient has not been eating and drinking for about 5 days. She was brought to the emergency room by a friend. Because of the patient's presentation, the initial plan was to get the patient admitted to the psychiatric unit but when the lab work done in the emergency room came back and shows some abnormalities including anion gap acidosis, the patient was referred to the hospitalist service for evaluation for admission. The patient originally came from [facility]. She has been living in [place] for about 2 years. The patient had similar presentation in 2018. She was admitted to the psychiatric unit here at [facility] from 2018 to 2018. The patient was diagnosed with psychotic disorder, was discharged home on Risperdal.  It is not clear whether the patient has a regular follow up with the psychiatrist.  The patient is not providing any history in the emergency room but there was no fever, rigors or chills reported. PAST MEDICAL HISTORY:  Hypertension, psychosis.     ALLERGIES:  NO KNOWN DRUG ALLERGIES. MEDICATIONS:  1. Lisinopril 10 mg daily. 2.  Zyrtec 10 mg daily. FAMILY HISTORY:  This was reviewed. Her mother had breast cancer. PAST SURGICAL HISTORY:  Unable to obtain. SOCIAL HISTORY:  It was reported that the patient has no history of alcohol or tobacco abuse. REVIEW OF SYSTEMS:  Unable to obtain because of the patient's mental status. PHYSICAL EXAMINATION:  GENERAL APPEARANCE:  The patient appeared ill, in moderate distress. VITAL SIGNS:  On arrival at the emergency room; temperature 97.8, pulse 102, respiratory rate 17, blood pressure 170/90, oxygen saturation 99% on room air. HEENT:  Head:  Normocephalic, atraumatic. Eyes:  Unable to assess eye movement but no redness, no drainage, no discharge. Ears:  Normal external ears with no evidence of drainage. Nose:  No deformity, no drainage. Mouth and Throat:  No visible oral lesion. Dry oral mucosa. NECK:  Neck is supple. No JVD, no thyromegaly. CHEST:  Clear breath sounds. No wheezing, no crackles. HEART:  Normal S1 and S2, regular. No clinically appreciable murmur. ABDOMEN:  Soft, nontender. Normal bowel sounds. CNS:  Alert. Not oriented. No gross focal neurological deficit. EXTREMITIES:  No edema. Pulses 2+ bilaterally. MUSCULOSKELETAL SYSTEM:  No obvious joint deformity or swelling. SKIN:  No active skin lesions seen in the exposed part of the body. PSYCHIATRY:  Unable to assess mood and affect. LYMPHATIC SYSTEM:  No cervical lymphadenopathy. DIAGNOSTIC DATA:  None. LABORATORY DATA:  Hematology:  WBC 7.2, hemoglobin 15.4, hematocrit 50.3, platelets 95. Chemistry:  Sodium 141, potassium 3.2, chloride 109, CO2 of 12, glucose 91, BUN 14, creatinine 1.46, calcium 9.7, total bilirubin 0.6, ALT 30, AST 31, alkaline phosphatase 52, total protein 9.6, albumin level 4.4, globulin 5.2. Serum alcohol level less than 10. ASSESSMENT:  1. Acute delirium. 2.  Hypertension. 3.  Metabolic acidosis.   4. Acute kidney injury. 5.  Thrombocytopenia. 6.  Hypokalemia. 7.  Psychosis. PLAN:  1. Acute delirium: We will admit the patient for further evaluation and treatment. This is most likely due to metabolic event. It could also be due to the patient's psychosis. We will check CT scan of the head to rule out acute pathology. We will check urine drug screen, ammonia level. We will check a TSH level. We will identify and treat underlying etiological factors. 2.  Hypertension: We will place the patient on hydralazine as needed. We will monitor the patient's blood pressure closely. We will check a TSH level, EKG, and chest x-ray. We will also check urinalysis. 3.  Metabolic acidosis: This is most likely as a result of the patient not eating or drinking for 5 days. We will carry out fluid therapy. We will monitor the patient's lab. Nephrology consult will be requested to assist in further evaluation and treatment. 4.  Acute kidney injury: This is most likely due to volume depletion. This is most likely contributing to the patient's acute delirium. We will carry out fluid therapy. We will monitor the patient's renal function. We will await further recommendation from the nephrologist.  5.  Thrombocytopenia:  The patient is asymptomatic. We will monitor the patient's platelet count. 6.  Hypokalemia: We will replace potassium and repeat potassium level. We will also check magnesium level. 7.  Psychosis:  Psychiatric consult will be requested to assist in further evaluation and treatment, the patient's acute delirium could be as a result of psychosis. OTHER ISSUES:  Code status: The patient is a full code. We will request SCD for DVT prophylaxis. COVID PRECAUTION:  The patient was wearing a face mask. I was wearing a face mask, cap, and gloves for this patient's encounter. The patient will be screened for COVID-19 virus infection because of exposure to multiple people.     FUNCTIONAL STATUS PRIOR TO ADMISSION:  The patient came from home. The patient is ambulatory with no assistant or device. Felix Thomas MD      [id number]  D:  09/03/2020 7:22  T:  09/03/2020 8:47  JOB #:  [id number]  CC:   MADDI Sanchez

## 2020-09-03 NOTE — ED TRIAGE NOTES
Patient arrives ambulatory from home. Pt states she wants a medication to help her sleep because she has been busy with [employer] work. Patient is a poor historian, it is difficult to get additional information from her. Denies any other complaints this evening. Patient's friend pulled this RN aside and said that patient has been having strange behavior, such as not sleeping, eating. Friend states she hears noises that are not there, and seems paranoid that people are watching her.

## 2020-09-03 NOTE — PROGRESS NOTES
Patient seen and examined. H&P reviewed. Acute delirium  -Metabolic encephalopathy versus psychosis  -Ammonia level is normal, urine drug screen pending    History of psychosis  -Patient with previous psychiatric admission  -Psychiatry consulted    ANI  -[place] from volume depletion secondary to poor p.o. intake for a few days  -Continue IV fluid and monitor renal function    Metabolic acidosis  -This could be as a result of ANI  -Change IV fluid to D5 with bicarb  -Follow electrolytes in a.m.     Thrombocytopenia  -Monitor CBC    Hypertension  -Stable, continue hydralazine PRN

## 2020-09-03 NOTE — ED NOTES
Pt suddenly became aggressive towards the female  at her bedside, screaming in an unknown language, throwing her blankets at her. Pt was moved to room 8 where [facility] is evaluating.   Report given to Amirah, [facility]

## 2020-09-03 NOTE — ED PROVIDER NOTES
HPI     80-year-old female with history of hypertension and prior episode of psychosis presents to the emergency department with her friend for bizarre behavior. Per the friend she has been in a mission and was recently asked to leave due to her behavior and paranoia. Her friend who is with her now states her behavior has been bizarre she seems to be paranoid. She is not eating and drinking or sleeping in the last 5 days. Patient will only selectively answer my questions. Past Medical History:   Diagnosis Date    Hypertension     Ill-defined condition     GERD       History reviewed. No pertinent surgical history.       Family History:   Problem Relation Age of Onset    Breast Cancer Mother 68       Social History     Socioeconomic History    Marital status: LEGALLY      Spouse name: Not on file    Number of children: Not on file    Years of education: Not on file    Highest education level: Not on file   Occupational History    Not on file   Social Needs    Financial resource strain: Not on file    Food insecurity     Worry: Not on file     Inability: Not on file    Transportation needs     Medical: Not on file     Non-medical: Not on file   Tobacco Use    Smoking status: Never Smoker    Smokeless tobacco: Never Used   Substance and Sexual Activity    Alcohol use: No    Drug use: Not on file    Sexual activity: Not on file   Lifestyle    Physical activity     Days per week: Not on file     Minutes per session: Not on file    Stress: Not on file   Relationships    Social connections     Talks on phone: Not on file     Gets together: Not on file     Attends Christianity service: Not on file     Active member of club or organization: Not on file     Attends meetings of clubs or organizations: Not on file     Relationship status: Not on file    Intimate partner violence     Fear of current or ex partner: Not on file     Emotionally abused: Not on file     Physically abused: Not on file Forced sexual activity: Not on file   Other Topics Concern    Not on file   Social History Narrative    Not on file         ALLERGIES: Patient has no known allergies. Review of Systems   Unable to perform ROS: Psychiatric disorder       Vitals:    09/02/20 2329   BP: 170/90   Pulse: (!) 102   Resp: 17   Temp: 97.8 °F (36.6 °C)   SpO2: 99%            Physical Exam  Constitutional:       General: She is not in acute distress. Appearance: She is well-developed. Comments: Patient pushes my hand away when I touch her. HENT:      Head: Normocephalic and atraumatic. Mouth/Throat:      Pharynx: No oropharyngeal exudate. Eyes:      General: No scleral icterus. Right eye: No discharge. Left eye: No discharge. Pupils: Pupils are equal, round, and reactive to light. Neck:      Musculoskeletal: Normal range of motion and neck supple. Vascular: No JVD. Cardiovascular:      Rate and Rhythm: Normal rate. Heart sounds: No murmur. Pulmonary:      Effort: Pulmonary effort is normal. No respiratory distress. Breath sounds: No stridor. No wheezing or rales. Chest:      Chest wall: No tenderness. Abdominal:      General: There is no distension. Palpations: There is no mass. Tenderness: There is no abdominal tenderness. There is no guarding or rebound. Musculoskeletal: Normal range of motion. Skin:     General: Skin is warm and dry. Capillary Refill: Capillary refill takes less than 2 seconds. Findings: No rash. Neurological:      Comments: States she is in [facility] answer date questions   Psychiatric:         Attention and Perception: She is inattentive. Mood and Affect: Affect is labile and angry. Behavior: Behavior is aggressive and withdrawn. Thought Content: Thought content is paranoid. MDM       Procedures      Patient seen by [facility]smart and in agreement with admission.      Labs show anion gap metabolic acidosis. Will check lactate and vbg, IV fluids and admit to medicine. Patient is agreeble. Perfect Serve Consult for Admission  5:30 AM    ED Room Number: ER08/08  Patient Name and age:  León Hurley 60 y.o.  female  Working Diagnosis:   1. Psychosis, unspecified psychosis type ([facility] [address]    2. Metabolic acidosis        [id number] Suspicion:  no  Sepsis present:  no  Reassessment needed: no  Code Status:  Full Code  Readmission: no  Isolation Requirements:  no  Recommended Level of Care:  telemetry  Department:[facility] - 21   Other: 56-year-old female with a history of high blood pressure and prior psychotic episode presents the emergency department with paranoid behavior. She has not slept and had anything to eat or drink in 5 days according to her friend. She was evaluated by by [facility] who is going to admit her but her chemistries have come back and she has a anion gap metabolic acidosis. I am assuming this is starvation related. I have added a lactate and VBG and IV fluids.   She is agreeable to stay in the hospital.

## 2020-09-03 NOTE — LETTER
[address] 
[address] Work/School Note Date: 9/2/2020 To Whom It May concern: 
 
Mrs. Benjie Yin was in the emergency department overnight Sept 2-3, 2020 and is excuse from school today.   
 
Sincerely, 
 
 
 
 
Ramana Martinez MD

## 2020-09-04 ENCOUNTER — HOSPITAL ENCOUNTER (INPATIENT)
Age: 65
LOS: 6 days | Discharge: HOME | DRG: 885 | End: 2020-09-10
Attending: PSYCHIATRY & NEUROLOGY | Admitting: PSYCHIATRY & NEUROLOGY
Payer: SUBSIDIZED

## 2020-09-04 VITALS
DIASTOLIC BLOOD PRESSURE: 77 MMHG | OXYGEN SATURATION: 100 % | RESPIRATION RATE: 15 BRPM | SYSTOLIC BLOOD PRESSURE: 118 MMHG | TEMPERATURE: 98.5 F | BODY MASS INDEX: 25.47 KG/M2 | WEIGHT: 151 LBS | HEART RATE: 85 BPM

## 2020-09-04 PROBLEM — F29 PSYCHOSIS (HCC): Status: ACTIVE | Noted: 2020-09-04

## 2020-09-04 LAB
ALBUMIN SERPL-MCNC: 3.2 G/DL (ref 3.5–5)
ALBUMIN/GLOB SERPL: 0.8 {RATIO} (ref 1.1–2.2)
ALP SERPL-CCNC: 44 U/L (ref 45–117)
ALT SERPL-CCNC: 21 U/L (ref 12–78)
ANION GAP SERPL CALC-SCNC: 7 MMOL/L (ref 5–15)
AST SERPL-CCNC: 19 U/L (ref 15–37)
BASOPHILS # BLD: 0 K/UL (ref 0–0.1)
BASOPHILS NFR BLD: 1 % (ref 0–1)
BILIRUB SERPL-MCNC: 0.9 MG/DL (ref 0.2–1)
BUN SERPL-MCNC: 11 MG/DL (ref 6–20)
BUN/CREAT SERPL: 10 (ref 12–20)
CALCIUM SERPL-MCNC: 8.5 MG/DL (ref 8.5–10.1)
CHLORIDE SERPL-SCNC: 110 MMOL/L (ref 97–108)
CO2 SERPL-SCNC: 26 MMOL/L (ref 21–32)
CREAT SERPL-MCNC: 1.05 MG/DL (ref 0.55–1.02)
DIFFERENTIAL METHOD BLD: ABNORMAL
EOSINOPHIL # BLD: 0.1 K/UL (ref 0–0.4)
EOSINOPHIL NFR BLD: 1 % (ref 0–7)
ERYTHROCYTE [DISTWIDTH] IN BLOOD BY AUTOMATED COUNT: 11.9 % (ref 11.5–14.5)
GLOBULIN SER CALC-MCNC: 4 G/DL (ref 2–4)
GLUCOSE SERPL-MCNC: 99 MG/DL (ref 65–100)
HCT VFR BLD AUTO: 36.7 % (ref 35–47)
HGB BLD-MCNC: 12.4 G/DL (ref 11.5–16)
IMM GRANULOCYTES # BLD AUTO: 0 K/UL (ref 0–0.04)
IMM GRANULOCYTES NFR BLD AUTO: 0 % (ref 0–0.5)
LYMPHOCYTES # BLD: 1.7 K/UL (ref 0.8–3.5)
LYMPHOCYTES NFR BLD: 31 % (ref 12–49)
MCH RBC QN AUTO: 32.5 PG (ref 26–34)
MCHC RBC AUTO-ENTMCNC: 33.8 G/DL (ref 30–36.5)
MCV RBC AUTO: 96.3 FL (ref 80–99)
MONOCYTES # BLD: 0.9 K/UL (ref 0–1)
MONOCYTES NFR BLD: 16 % (ref 5–13)
NEUTS SEG # BLD: 2.9 K/UL (ref 1.8–8)
NEUTS SEG NFR BLD: 51 % (ref 32–75)
NRBC # BLD: 0 K/UL (ref 0–0.01)
NRBC BLD-RTO: 0 PER 100 WBC
PLATELET # BLD AUTO: 185 K/UL (ref 150–400)
PMV BLD AUTO: 10.2 FL (ref 8.9–12.9)
POTASSIUM SERPL-SCNC: 2.8 MMOL/L (ref 3.5–5.1)
POTASSIUM SERPL-SCNC: 3.2 MMOL/L (ref 3.5–5.1)
PROT SERPL-MCNC: 7.2 G/DL (ref 6.4–8.2)
RBC # BLD AUTO: 3.81 M/UL (ref 3.8–5.2)
SODIUM SERPL-SCNC: 143 MMOL/L (ref 136–145)
WBC # BLD AUTO: 5.6 K/UL (ref 3.6–11)

## 2020-09-04 PROCEDURE — 80053 COMPREHEN METABOLIC PANEL: CPT

## 2020-09-04 PROCEDURE — 85025 COMPLETE CBC W/AUTO DIFF WBC: CPT

## 2020-09-04 PROCEDURE — 74011250636 HC RX REV CODE- 250/636: Performed by: FAMILY MEDICINE

## 2020-09-04 PROCEDURE — 74011000250 HC RX REV CODE- 250: Performed by: FAMILY MEDICINE

## 2020-09-04 PROCEDURE — 84132 ASSAY OF SERUM POTASSIUM: CPT

## 2020-09-04 PROCEDURE — 74011250637 HC RX REV CODE- 250/637: Performed by: FAMILY MEDICINE

## 2020-09-04 PROCEDURE — 94760 N-INVAS EAR/PLS OXIMETRY 1: CPT

## 2020-09-04 PROCEDURE — 74011250637 HC RX REV CODE- 250/637: Performed by: INTERNAL MEDICINE

## 2020-09-04 PROCEDURE — 74011250637 HC RX REV CODE- 250/637: Performed by: NURSE PRACTITIONER

## 2020-09-04 PROCEDURE — 65220000003 HC RM SEMIPRIVATE PSYCH

## 2020-09-04 PROCEDURE — 36415 COLL VENOUS BLD VENIPUNCTURE: CPT

## 2020-09-04 RX ORDER — ACETAMINOPHEN 325 MG/1
650 TABLET ORAL
Status: DISCONTINUED | OUTPATIENT
Start: 2020-09-04 | End: 2020-09-10 | Stop reason: HOSPADM

## 2020-09-04 RX ORDER — POTASSIUM CHLORIDE 750 MG/1
20 TABLET, FILM COATED, EXTENDED RELEASE ORAL
Status: COMPLETED | OUTPATIENT
Start: 2020-09-04 | End: 2020-09-04

## 2020-09-04 RX ORDER — DIPHENHYDRAMINE HYDROCHLORIDE 50 MG/ML
50 INJECTION, SOLUTION INTRAMUSCULAR; INTRAVENOUS
Status: DISCONTINUED | OUTPATIENT
Start: 2020-09-04 | End: 2020-09-10 | Stop reason: HOSPADM

## 2020-09-04 RX ORDER — POTASSIUM CHLORIDE 7.45 MG/ML
10 INJECTION INTRAVENOUS
Status: COMPLETED | OUTPATIENT
Start: 2020-09-04 | End: 2020-09-04

## 2020-09-04 RX ORDER — TRAZODONE HYDROCHLORIDE 50 MG/1
50 TABLET ORAL
Status: DISCONTINUED | OUTPATIENT
Start: 2020-09-04 | End: 2020-09-10 | Stop reason: HOSPADM

## 2020-09-04 RX ORDER — HALOPERIDOL 5 MG/ML
5 INJECTION INTRAMUSCULAR
Status: DISCONTINUED | OUTPATIENT
Start: 2020-09-04 | End: 2020-09-10 | Stop reason: HOSPADM

## 2020-09-04 RX ORDER — BENZTROPINE MESYLATE 1 MG/1
1 TABLET ORAL
Status: DISCONTINUED | OUTPATIENT
Start: 2020-09-04 | End: 2020-09-10 | Stop reason: HOSPADM

## 2020-09-04 RX ORDER — HALOPERIDOL 5 MG/ML
1 INJECTION INTRAMUSCULAR ONCE
Status: ACTIVE | OUTPATIENT
Start: 2020-09-04 | End: 2020-09-04

## 2020-09-04 RX ORDER — OLANZAPINE 5 MG/1
5 TABLET ORAL
Status: DISCONTINUED | OUTPATIENT
Start: 2020-09-04 | End: 2020-09-10 | Stop reason: HOSPADM

## 2020-09-04 RX ORDER — RISPERIDONE 0.25 MG/1
0.5 TABLET, FILM COATED ORAL ONCE
Status: COMPLETED | OUTPATIENT
Start: 2020-09-04 | End: 2020-09-04

## 2020-09-04 RX ORDER — HYDROXYZINE 50 MG/1
50 TABLET, FILM COATED ORAL
Status: DISCONTINUED | OUTPATIENT
Start: 2020-09-04 | End: 2020-09-10 | Stop reason: HOSPADM

## 2020-09-04 RX ORDER — ADHESIVE BANDAGE
30 BANDAGE TOPICAL DAILY PRN
Status: DISCONTINUED | OUTPATIENT
Start: 2020-09-04 | End: 2020-09-10 | Stop reason: HOSPADM

## 2020-09-04 RX ORDER — POTASSIUM CHLORIDE 750 MG/1
40 TABLET, FILM COATED, EXTENDED RELEASE ORAL
Status: COMPLETED | OUTPATIENT
Start: 2020-09-04 | End: 2020-09-04

## 2020-09-04 RX ORDER — LORAZEPAM 2 MG/ML
1 INJECTION INTRAMUSCULAR
Status: DISCONTINUED | OUTPATIENT
Start: 2020-09-04 | End: 2020-09-10 | Stop reason: HOSPADM

## 2020-09-04 RX ADMIN — POTASSIUM CHLORIDE 10 MEQ: 10 INJECTION, SOLUTION INTRAVENOUS at 13:27

## 2020-09-04 RX ADMIN — POTASSIUM CHLORIDE 20 MEQ: 750 TABLET, FILM COATED, EXTENDED RELEASE ORAL at 17:23

## 2020-09-04 RX ADMIN — SODIUM BICARBONATE 150 MEQ/1,000 ML IN DEXTROSE 5 % INTRAVENOUS: SOLUTION at 02:36

## 2020-09-04 RX ADMIN — RISPERIDONE 0.5 MG: 0.25 TABLET ORAL at 03:58

## 2020-09-04 RX ADMIN — CETIRIZINE HYDROCHLORIDE 10 MG: 10 TABLET, FILM COATED ORAL at 21:41

## 2020-09-04 RX ADMIN — POTASSIUM CHLORIDE 10 MEQ: 10 INJECTION, SOLUTION INTRAVENOUS at 12:00

## 2020-09-04 RX ADMIN — POTASSIUM CHLORIDE 40 MEQ: 750 TABLET, FILM COATED, EXTENDED RELEASE ORAL at 11:05

## 2020-09-04 RX ADMIN — ACETAMINOPHEN 650 MG: 325 TABLET ORAL at 10:23

## 2020-09-04 RX ADMIN — PROMETHAZINE HYDROCHLORIDE 12.5 MG: 25 TABLET ORAL at 14:04

## 2020-09-04 RX ADMIN — Medication 10 ML: at 06:00

## 2020-09-04 RX ADMIN — Medication 10 ML: at 13:29

## 2020-09-04 NOTE — PROGRESS NOTES
1600 Internal medicine medically cleared pt to transfer to inpatient psych unit. Psych called and notified to evaluate pt. Awaiting bed at this time. 1930 Bedside shift change report given to Yanira (oncoming nurse) by Alexys Parada (offgoing nurse). Report included the following information SBAR, Kardex, Intake/Output, MAR, Accordion and Recent Results. Problem: Falls - Risk of  Goal: *Absence of Falls  Note: Mental status prevents appropriate knowledge of fall prevention. Bed alarm in place, turned on, and audible from the nurses station. Frequent purposeful rounding initiated by staff.

## 2020-09-04 NOTE — PROGRESS NOTES
[facility]                                                                                          Hospitalist Progress Note  Vikki Waldron MD  Answering service: [phone] from in house phone        Date of Service:  2020  NAME:  Jed Montero  :  1955  MRN:  [id number]      Admission Summary:     Patient was in her usual state of health until the day of her presentation at the emergency room when it was reported that the patient developed bizarre behavior at the home where the patient resides. According to report, the patient resides at home with group of other people. Because of the bizarre behavior, the patient was kicked out of the house. It was also reported that the patient has not been eating and drinking for about 5 days. She was brought to the emergency room by a friend. Because of the patient's presentation, the initial plan was to get the patient admitted to the psychiatric unit but when the lab work done in the emergency room came back and shows some abnormalities including anion gap acidosis, the patient was referred to the hospitalist service for evaluation for admission. Interval history / Subjective:       No acute complaint.      Assessment & Plan:     Acute delirium  -Metabolic encephalopathy versus psychosis  -Ammonia level is normal, urine drug screen pending     History of psychosis  -Patient with previous psychiatric admission  -Psychiatry evaluated the patient  -Medically ready now for psych admit     ANI  -[place] from volume depletion secondary to poor p.o. intake for a few days  -Now resolved     Metabolic acidosis  -Now resolved     Thrombocytopenia  -Monitor CBC     Hypertension  -Stable, continue hydralazine PRN    Code status: FULL  DVT prophylaxis: Elvis Barrios discussed with: Patient/Family  Anticipated Disposition: Psych, medically ready for disposition to psych  Anticipated Discharge: Less than 24 hours Hospital Problems  Date Reviewed: 9/3/2020          Codes Class Noted POA    * (Principal) Acute delirium ICD-10-CM: R41.0  ICD-9-CM: 780.09  9/3/2020 Yes                Review of Systems:   A comprehensive review of systems was negative except for that written in the HPI. Vital Signs:    Last 24hrs VS reviewed since prior progress note. Most recent are:  Visit Vitals  /77 (BP 1 Location: Right arm, BP Patient Position: At rest)   Pulse 85   Temp 98.5 °F (36.9 °C)   Resp 15   Wt 68.5 kg (151 lb)   SpO2 100%   BMI 25.47 kg/m²         Intake/Output Summary (Last 24 hours) at 9/4/2020 1651  Last data filed at 9/4/2020 1529  Gross per 24 hour   Intake    Output 450 ml   Net -450 ml        Physical Examination:             Constitutional:  No acute distress, cooperative, pleasant    ENT:  Oral mucosa moist, oropharynx benign. Resp:  CTA bilaterally. No wheezing/rhonchi/rales. No accessory muscle use   CV:  Regular rhythm, normal rate, no murmurs, gallops, rubs    GI:  Soft, non distended, non tender. normoactive bowel sounds, no hepatosplenomegaly     Musculoskeletal:  No edema, warm, 2+ pulses throughout    Neurologic:  Moves all extremities.   AAOx3, CN II-XII reviewed     Skin:  Good turgor, no rashes or ulcers       Data Review:    Review and/or order of clinical lab test      Labs:     Recent Labs     09/04/20 0820 09/03/20  0212   WBC 5.6 4.2   HGB 12.4 15.4   HCT 36.7 50.3*    95*     Recent Labs     09/04/20  1533 09/04/20  0820 09/03/20  0903 09/03/20  0212   NA  --  143  --  141   K 3.2* 2.8*  --  3.2*   CL  --  110*  --  109*   CO2  --  26  --  12*   BUN  --  11  --  14   CREA  --  1.05*  --  1.46*   GLU  --  99  --  91   CA  --  8.5  --  9.7   MG  --   --  2.0  --    PHOS  --   --  2.6  --      Recent Labs     09/04/20  0820 09/03/20  0903 09/03/20  0212   ALT 21  --  30   AP 44*  --  52   TBILI 0.9  --  0.6   TP 7.2  --  9.6*   ALB 3.2*  --  4.4   GLOB 4.0  --  5.2*   LPSE  --  84 --      Recent Labs     09/03/20  0903   INR 1.0   PTP 10.8   APTT 29.0      Recent Labs     09/03/20  0903   FERR 248      Lab Results   Component Value Date/Time    Folate 62.2 (H) 09/03/2020 09:03 AM      No results for input(s): PH, PCO2, PO2 in the last 72 hours.   Recent Labs     09/03/20  0903   TROIQ <0.05     Lab Results   Component Value Date/Time    Cholesterol, total 187 05/29/2019 10:03 AM    HDL Cholesterol 69 05/29/2019 10:03 AM    LDL, calculated 107.2 (H) 05/29/2019 10:03 AM    Triglyceride 54 05/29/2019 10:03 AM    CHOL/HDL Ratio 2.7 05/29/2019 10:03 AM     Lab Results   Component Value Date/Time    Glucose POC NF 87 05/22/2019 10:26 AM     Lab Results   Component Value Date/Time    Color YELLOW/STRAW 02/25/2018 09:22 AM    Appearance CLEAR 02/25/2018 09:22 AM    Specific gravity 1.014 02/25/2018 09:22 AM    pH (UA) 5.5 02/25/2018 09:22 AM    Protein TRACE (A) 02/25/2018 09:22 AM    Glucose NEGATIVE  02/25/2018 09:22 AM    Ketone TRACE (A) 02/25/2018 09:22 AM    Bilirubin NEGATIVE  02/25/2018 09:22 AM    Urobilinogen 0.2 02/25/2018 09:22 AM    Nitrites NEGATIVE  02/25/2018 09:22 AM    Leukocyte Esterase TRACE (A) 02/25/2018 09:22 AM    Epithelial cells FEW 02/25/2018 09:22 AM    Bacteria NEGATIVE  02/25/2018 09:22 AM    WBC 0-4 02/25/2018 09:22 AM    RBC 0-5 02/25/2018 09:22 AM         Medications Reviewed:     Current Facility-Administered Medications   Medication Dose Route Frequency    haloperidol lactate (HALDOL) injection 1 mg  1 mg IntraVENous ONCE    cetirizine (ZYRTEC) tablet 10 mg  10 mg Oral QHS    sodium chloride (NS) flush 5-40 mL  5-40 mL IntraVENous Q8H    sodium chloride (NS) flush 5-40 mL  5-40 mL IntraVENous PRN    acetaminophen (TYLENOL) tablet 650 mg  650 mg Oral Q6H PRN    Or    acetaminophen (TYLENOL) suppository 650 mg  650 mg Rectal Q6H PRN    polyethylene glycol (MIRALAX) packet 17 g  17 g Oral DAILY PRN    promethazine (PHENERGAN) tablet 12.5 mg  12.5 mg Oral Q6H PRN Or    ondansetron (ZOFRAN) injection 4 mg  4 mg IntraVENous Q6H PRN    hydrALAZINE (APRESOLINE) 20 mg/mL injection 10 mg  10 mg IntraVENous Q6H PRN     ______________________________________________________________________  EXPECTED LENGTH OF STAY: - - -  ACTUAL LENGTH OF STAY:          1                 Renuka Chong MD

## 2020-09-04 NOTE — PROGRESS NOTES
2000: Bedside shift change report given to Anjelica Sosa RN (oncoming nurse) by Lele Puentes RN (offgoing nurse). Report included the following information SBAR, Kardex, ED Summary, Intake/Output, MAR, Accordion, Recent Results and Cardiac Rhythm ST. Primary Nurse Bakari Lopez and Maria Fernanda Doan RN performed a dual skin assessment on this patient No impairment noted  Rishi score is 19.      1800: TRANSFER - IN REPORT:    Verbal report received from [address] [place] [address] JAS Sage(name) on Johnna Allen  being received from ED(unit) for routine progression of care      Report consisted of patients Situation, Background, Assessment and   Recommendations(SBAR). Information from the following report(s) SBAR, Kardex, ED Summary, Intake/Output, MAR, Accordion, Recent Results and Cardiac Rhythm ST was reviewed with the receiving nurse. Opportunity for questions and clarification was provided. Assessment completed upon patients arrival to unit and care assumed.

## 2020-09-04 NOTE — CONSULTS
PSYCHIATRY CONSULT NOTE:    REASON FOR CONSULT: psychosis      HISTORY OF PRESENTING COMPLAINT:  Johnna Allen is a 60 y.o. OTHER female who is currently in the Emergency Department at [facility]. She has been assessed by [facility] and psychiatric hospitalization has been suggested but Ms. Elaine is being admitted to a medical unit first. Per chart and staff report, she is a poor historian. She tells me she came to [place] from [place] because the [address] told her to come . She says was at a [employer] and was following what God was telling her to do to help various people. She does describe visual hallucinations at times, but states it is what God is showing her. She was previously hospitalized at [facility] in 2018, but speaks about it as if it were in the last week. She is pleasant and tangential. She denies SI/HI. PAST PSYCHIATRIC HISTORY and SUBSTANCE ABUSE HISTORY:  psychosis      PAST MEDICAL HISTORY:  Please see H&P for details. Past Medical History:   Diagnosis Date    Hypertension     Ill-defined condition     GERD           Lab Results   Component Value Date/Time    WBC 4.2 09/03/2020 02:12 AM    HGB 15.4 09/03/2020 02:12 AM    HCT 50.3 (H) 09/03/2020 02:12 AM    PLATELET 95 (L) 70/27/9565 02:12 AM    .1 (H) 09/03/2020 02:12 AM      Lab Results   Component Value Date/Time    Sodium 141 09/03/2020 02:12 AM    Potassium 3.2 (L) 09/03/2020 02:12 AM    Chloride 109 (H) 09/03/2020 02:12 AM    CO2 12 (LL) 09/03/2020 02:12 AM    Anion gap 20 (H) 09/03/2020 02:12 AM    Glucose 91 09/03/2020 02:12 AM    Glucose 89 02/27/2018 06:07 AM    BUN 14 09/03/2020 02:12 AM    Creatinine 1.46 (H) 09/03/2020 02:12 AM    BUN/Creatinine ratio 10 (L) 09/03/2020 02:12 AM    GFR est AA 44 (L) 09/03/2020 02:12 AM    GFR est non-AA 36 (L) 09/03/2020 02:12 AM    Calcium 9.7 09/03/2020 02:12 AM    Bilirubin, total 0.6 09/03/2020 02:12 AM    Alk.  phosphatase 52 09/03/2020 02:12 AM Protein, total 9.6 (H) 09/03/2020 02:12 AM    Albumin 4.4 09/03/2020 02:12 AM    Globulin 5.2 (H) 09/03/2020 02:12 AM    A-G Ratio 0.8 (L) 09/03/2020 02:12 AM    ALT (SGPT) 30 09/03/2020 02:12 AM          PSYCHOSOCIAL HISTORY:  Living with friend, has 5 children, originally from [address] [place] [address] Place:    General appearance:  Appropriately  groomed, psychomotor activity is wnl  Eye contact: intense  Speech: fluent  Affect : blunted  Mood: \"fine \"  Thought Process: tangential, bizarre  Perception: Hears the \"voice of the Lord\"  Thought Content: Denies SI or Plan  Insight: Partial  Judgement: Fair  Cognition: Intact grossly. ASSESSMENT AND PLAN:  Mingo Favre meets criteria for a diagnosis of psychosis, unspecified. I agree with previous recommendation to admit to psychiatry once medically cleared. Risperidone 0.5mg at bedtime may be started if desired by the primary team.    Thank you for this consultation    SHELDON Yanez-BC  September 3, 2020 .

## 2020-09-04 NOTE — PROGRESS NOTES
[facility] Charge RN in patients chart as patient was assigned bed on [facility]. Patient then had discharge orders from [facility].

## 2020-09-04 NOTE — DISCHARGE SUMMARY
Discharge Summary       PATIENT ID: Linsey Aleman  MRN: [id number]   YOB: 1955    DATE OF ADMISSION: 9/3/2020 12:28 AM    DATE OF DISCHARGE: 9/4/2020   PRIMARY CARE PROVIDER: MADDI Sapp     ATTENDING PHYSICIAN: Kacie Garza  DISCHARGING PROVIDER: Renuka Chong MD    To contact this individual call [phone] and ask the  to page. If unavailable ask to be transferred the Adult Hospitalist Department. CONSULTATIONS: IP CONSULT TO NEPHROLOGY  IP CONSULT TO PSYCHIATRY    PROCEDURES/SURGERIES: * No surgery found *    ADMITTING DIAGNOSES & HOSPITAL COURSE:     HPI  Patient was in her usual state of health until the day of her presentation at the emergency room when it was reported that the patient developed bizarre behavior at the home where the patient resides.  According to report, the patient resides at home with group of other people.  Because of the bizarre behavior, the patient was kicked out of the house. Javierpiotrjacqueline Ednans was also reported that the patient has not been eating and drinking for about 5 days. Austin Moreno was brought to the emergency room by a friend. Patricio Carsonters of the patient's presentation, the initial plan was to get the patient admitted to the psychiatric unit but when the lab work done in the emergency room came back and shows some abnormalities including anion gap acidosis, the patient was referred to the hospitalist service for evaluation for admission. Hospital course  Patient's ANI and metabolic acidosis is improved with volume resuscitation. Patient has hypokalemia which is also being replaced and improved. Patient will be admitted to inpatient psychiatry for further evaluation and management of her psychosis. DISCHARGE DIAGNOSES / PLAN:      1. Acute delirium  2. Psychosis  3. ANI  4. Metabolic acidosis  5. Thrombocytopenia  6.  Hypertension     ADDITIONAL CARE RECOMMENDATIONS:     None     PENDING TEST RESULTS:   At the time of discharge the following test results are still pending: None    FOLLOW UP APPOINTMENTS:    Follow-up Information     Follow up With Specialties Details Why Contact Info    Karlie Brenna, Yeni Martinma Physician Assistant   [address]  [address] [facility]  [address] [place] [address]  [phone]               DIET: Cardiac Diet  Oral Nutritional Supplements: No Oral Supplement prescribed    ACTIVITY: Activity as tolerated    WOUND CARE: None    EQUIPMENT needed: None      DISCHARGE MEDICATIONS:  Current Discharge Medication List      CONTINUE these medications which have NOT CHANGED    Details   cetirizine (ZYRTEC) 10 mg tablet Take 1 Tab by mouth nightly. For allergies, instead of loratidine. Qty: 30 Tab, Refills: 5         STOP taking these medications       lisinopril (PRINIVIL, ZESTRIL) 20 mg tablet Comments:   Reason for Stopping:                 NOTIFY YOUR PHYSICIAN FOR ANY OF THE FOLLOWING:   Fever over 101 degrees for 24 hours. Chest pain, shortness of breath, fever, chills, nausea, vomiting, diarrhea, change in mentation, falling, weakness, bleeding. Severe pain or pain not relieved by medications. Or, any other signs or symptoms that you may have questions about. DISPOSITION:    Home With:   OT  PT  HH  RN       Long term SNF/Inpatient Rehab    Independent/assisted living    Hospice    Other:       PATIENT CONDITION AT DISCHARGE:     Functional status    Poor     Deconditioned     Independent      Cognition     Lucid     Forgetful     Dementia      Catheters/lines (plus indication)    Caro     PICC     PEG     None      Code status     Full code     DNR      PHYSICAL EXAMINATION AT DISCHARGE:  General:          Alert, cooperative, no distress, appears stated age. HEENT:           Atraumatic, anicteric sclerae, pink conjunctivae                          No oral ulcers, mucosa moist, throat clear, dentition fair  Neck:               Supple, symmetrical  Lungs:             Clear to auscultation bilaterally. No Wheezing or Rhonchi.  No rales.  Chest wall:      No tenderness  No Accessory muscle use. Heart:              Regular  rhythm,  No  murmur   No edema  Abdomen:        Soft, non-tender. Not distended. Bowel sounds normal  Extremities:     No cyanosis. No clubbing,                            Skin turgor normal, Capillary refill normal  Skin:                Not pale. Not Jaundiced  No rashes   Psych:             Not anxious or agitated.   Neurologic:      Alert, moves all extremities, answers questions appropriately and responds to commands       CHRONIC MEDICAL DIAGNOSES:  Problem List as of 9/4/2020 Date Reviewed: 9/3/2020          Codes Class Noted - Resolved    * (Principal) Acute delirium ICD-10-CM: R41.0  ICD-9-CM: 780.09  9/3/2020 - Present        [facility] ICD-10-CM: F29  ICD-9-CM: 298.9  2/25/2018 - Present              Greater than 60 minutes were spent with the patient on counseling and coordination of care    Signed:   May Mullen MD  9/4/2020  6:15 PM

## 2020-09-04 NOTE — PROGRESS NOTES
Bedside and Verbal shift change report given to JAS Harris (oncoming nurse) by Urbano De Guzman (offgoing nurse). Report included the following information SBAR, Kardex, Intake/Output, MAR, Accordion, Recent Results, Cardiac Rhythm NSR and Quality Measures. Problem: Falls - Risk of  Goal: *Absence of Falls  Description: Document Sarahmark Burnettedolores Fall Risk and appropriate interventions in the flowsheet. Outcome: Progressing Towards Goal  Note: Fall Risk Interventions:       Mentation Interventions: Adequate sleep, hydration, pain control, Bed/chair exit alarm, Increase mobility, More frequent rounding, Reorient patient, Room close to nurse's station, Toileting rounds, Update white board    Medication Interventions: Evaluate medications/consider consulting pharmacy, Patient to call before getting OOB, Teach patient to arise slowly, Bed/chair exit alarm    Elimination Interventions: Bed/chair exit alarm, Call light in reach, Patient to call for help with toileting needs, Stay With Me (per policy), Toileting schedule/hourly rounds              Problem: Pressure Injury - Risk of  Goal: *Prevention of pressure injury  Description: Document Rishi Scale and appropriate interventions in the flowsheet. Outcome: Progressing Towards Goal  Note: Pressure Injury Interventions:       Moisture Interventions: Absorbent underpads, Apply protective barrier, creams and emollients, Check for incontinence Q2 hours and as needed, Maintain skin hydration (lotion/cream), Limit adult briefs, Minimize layers, Moisture barrier    Activity Interventions: Increase time out of bed, Pressure redistribution bed/mattress(bed type)    Mobility Interventions: Float heels, Pressure redistribution bed/mattress (bed type), Turn and reposition approx.  every two hours(pillow and wedges)    Nutrition Interventions: Document food/fluid/supplement intake, Offer support with meals,snacks and hydration

## 2020-09-05 PROCEDURE — 74011250637 HC RX REV CODE- 250/637: Performed by: NURSE PRACTITIONER

## 2020-09-05 PROCEDURE — 65220000003 HC RM SEMIPRIVATE PSYCH

## 2020-09-05 RX ORDER — QUETIAPINE FUMARATE 25 MG/1
50 TABLET, FILM COATED ORAL
Status: DISCONTINUED | OUTPATIENT
Start: 2020-09-05 | End: 2020-09-06

## 2020-09-05 RX ORDER — LISINOPRIL 20 MG/1
20 TABLET ORAL DAILY
Status: ON HOLD | COMMUNITY
End: 2020-09-10 | Stop reason: SDUPTHER

## 2020-09-05 RX ORDER — MONTELUKAST SODIUM 10 MG/1
10 TABLET ORAL DAILY
COMMUNITY
End: 2020-09-10

## 2020-09-05 RX ADMIN — OLANZAPINE 5 MG: 5 TABLET, FILM COATED ORAL at 13:24

## 2020-09-05 RX ADMIN — HYDROXYZINE HYDROCHLORIDE 50 MG: 50 TABLET, FILM COATED ORAL at 13:24

## 2020-09-05 RX ADMIN — QUETIAPINE FUMARATE 50 MG: 25 TABLET ORAL at 22:18

## 2020-09-05 NOTE — BH NOTES
PSYCHOSOCIAL ASSESSMENT  :Patient identifying info:  Jaxon Giraldo is a 60 y.o., female admitted 9/4/2020 10:47 PM     Presenting problem and precipitating factors:  Pt was bought to [facility] by her friend ( Angelic Weinstein C# [phone] due to pt.s changed and bizarre behaviors. Pt has very strong Scientology beliefs and believes  she hears the voice of God. Pt does understand the definition of mental illness, its characteristics but it does not apply to her. She remains steadfast that her thoughts are not  delusional and thinking is very rational. Pt told US her life's story is terms of her giving her life to [facility] and following God's commands. Pt told US that while in [place] her [de-identified] yr old son was very ill. He was not responding to the  medications. A Voice ( God) asked why did continue to give son meds that were not working. His illness (?) caused his eyes to be very red. She was told to stop giving him the medications and wash his face with water from the river and 2-3 days later his eyes cleared and his illness went away. Pt said that  It was a miracle and she gave her life to [facility].. Pt  two yrs ago left [facility] and came to [place] on God's  command. She arrived  and [place] & was  met by her aunt. She was told that was not enough room in their home so she took her to the [employer] located in [place]. 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 Pt was sent by health agency to assist the elder in their daily activities. 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 Pt at this time had began a seven day fast and daily prayer. She admits to eating less food and limited fluid  intake. She had issues with not sleeping.  US notices her  strong beliefs in God but also vehemently opposed to be ing labeled as mentally ill and she does not need medications. US asked I could call her friend Amaury Carrington ad said yes. Mental status assessment: Alert, detailed memory, very intelligent and more than fair historian    Strengths: Very intelligent and strong Moravian beliefs     Collateral information: Charlotte Jerez ( close friend) C# [phone] SW advised  of th purpose of the call- gather valuable information for the purpose of treatment. Mrs Lucero Cuevas was very cooperative and worried about her friend. She has known pt for over a year. They met at the mission. She described the pt and being friendly, intelligent, caring for others and true believer in [address] asked how was she involved in bringing her to [facility]. ? She said the  pt was told to leave the mission because of  her paranoia and bizarre behaviors. She bought her to her home because she was  homeless. The first few days she  was doing okay but than she noticed the  changes: very paranoid, anxious, fearful of the  staff at the  mission and not sleeping. She had to trick  her in order to bring to the hospital. This explains why she was angry at her and  behaviors exhibited her anger in the  ER. Finally Mrs Lucero Cuevas is an advocate for her and wants her to return to her where she get help her return to her old self. US thanked her for information and unit SW would contact her to discus d/osvaldo anthony. She is in agreement.     Current psychiatric /substance abuse providers and contact info: None    Previous psychiatric/substance abuse providers and response to treatment: [facility] 2/25/2018- 3/ ( similar situations) Refused follow up pt psych services     Family history of mental illness or substance abuse:  None     Substance abuse history:  Denied - Never   Social History     Tobacco Use    Smoking status: Never Smoker    Smokeless tobacco: Never Used   Substance Use Topics    Alcohol use: No       History of biomedical complications associated with substance abuse :    Patient's current acceptance of treatment or motivation for change:     Family constellation:   5 Adult children    Is significant other involved?  N/A    Describe support system: Pt's friend ( Namrata Allison is her greatest source of support and she is involved in her current tx and d/c planning    Describe living arrangements and home environment: Pt is  , has five adult children ( [place]) and currently lives with a friend Ken Maynard) and she is welcomed to return back to her home    Health issues:   Hospital Problems  Date Reviewed: 9/3/2020          Codes Class Noted POA    Psychosis [facility]) ICD-10-CM: F34  ICD-9-CM: 298.9  2020 Unknown              Trauma history:  Denied     Legal issues: Denied     History of  service:     Financial status: Pt has no viable income and she receives financial help - friends     Catholic/cultural factors: Adventism     Education/work history:  Pt attended school in [place] and 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

## 2020-09-05 NOTE — PROGRESS NOTES
Problem: Falls - Risk of  Goal: *Absence of Falls  Description: Document Heraclio Neal Fall Risk and appropriate interventions in the flowsheet. 9/5/2020 0727 by Graham Gregg RN  Outcome: Progressing Towards Goal  Note: Fall Risk Interventions:  Mobility Interventions: Assess mobility with egress test  Mentation Interventions: Adequate sleep, hydration, pain control  Medication Interventions: Teach patient to arise slowly  Elimination Interventions: Toilet paper/wipes in reach    9/5/2020 0726 by Graham Gregg RN  Outcome: Progressing Towards Goal  Note: Fall Risk Interventions:  Mobility Interventions: Assess mobility with egress test  Mentation Interventions: Adequate sleep, hydration, pain control  Medication Interventions: Teach patient to arise slowly  Elimination Interventions: Toilet paper/wipes in reach    0700 Rec'd patient this am sitting at dining room table, sitting quietly. She is calm and cooperative, A&0 x 4.

## 2020-09-05 NOTE — PROGRESS NOTES
TRANSFER - IN REPORT:    Verbal report received from Berenice Clarke RN(name) on Yuridia Fontana  being received from [facility]) for routine progression of care      Report consisted of patients Situation, Background, Assessment and   Recommendations(SBAR). Information from the following report(s) SBAR and Kardex was reviewed with the receiving nurse. Opportunity for questions and clarification was provided. Assessment completed upon patients arrival to unit and care assumed.

## 2020-09-05 NOTE — INTERDISCIPLINARY ROUNDS
Behavioral Health Interdisciplinary Rounds Patient Name: Flaco Carter  Age: 60 y.o. Room/Bed:  744/ Primary Diagnosis: <principal problem not specified> Admission Status: Voluntary Readmission within 30 days: no 
Power of  in place: no 
Patient requires a blocked bed: no          Reason for blocked bed: VTE Prophylaxis: No 
 
Mobility needs/Fall risk: no 
Flu Vaccine : no  
Nutritional Plan: no 
Consults:         
Labs/Testing due today?: no 
 
Sleep hours:  3 Participation in Care/Groups:  yes Medication Compliant?:  
PRNS (last 24 hours): None Restraints (last 24 hours):  no 
  
CIWA (range last 24 hours): COWS (range last 24 hours): Alcohol screening (AUDIT) completed -   AUDIT Score: 0 If applicable, date SBIRT discussed in treatment team AND documented:  
AUDIT Screen Score: AUDIT Score: 0 Document Brief Intervention (corresponds directly with the 5 A's, Ask, Advise, Assess, Assist, and Arrange): At- Risk Patients (Score 7-15 for women; 8-15 for men) Discuss concern patient is drinking at unhealthy levels known to increase risk of alcohol-related health problems. Is Patient ready to commit to change? If No: 
? Encourage reflection ? Discuss short term and long term health risks of consuming alcohol ? Barriers to change ? Reaffirm willingness to help / Educational materials provided If Yes: 
? Set goal 
? Plan 
? Educational materials provided Harmful use or Dependence (Score 16 or greater) ? Discuss short term and long term health risks of consuming alcohol ? Recommendations ? Negotiate drinking goal 
? Recommend addiction specialist/center ? Arrange follow-up appointments. Tobacco - patient is a smoker: Have You Used Tobacco in the Past 30 Days: Never a smoker Illegal Drugs use: Have You Used Any Illegal Substances Over the Past 12 Months: No 
 
24 hour chart check complete: yes Patient goal(s) for today: Treatment team focus/goals:  
Progress note LOS:  1  Expected LOS:  
 
Financial concerns/prescription coverage:   
Family contact:      
Family requesting physician contact today:   
Discharge plan: Access to weapons :        
Outpatient provider(s):  
Patient's preferred phone number for follow up call :  
 
Participating treatment team members: Teetee Child, * (assigned SW),

## 2020-09-05 NOTE — BH NOTES
[address] [facility] [address]  Master Treatment Plan for Flaco Carter    Date Treatment Plan Initiated: 09/05/20  Goal will be met by: 09/12/20    Treatment Plan Modalities:  Type of Modality Amount  (x minutes) Frequency (x/week) Duration (x days) Name of Responsible Staff   [address] [facility] meetings to encourage peer interactions 15 7 1 Pearl Laboy RN     Group psychotherapy to assist in building coping skills and internal controls 60 7 1 Kevin Wyatt   Therapeutic activity groups to build coping skills 60 7 1 Kevin Wyatt   Psychoeducation in group setting to address:   Medication education   15 7 1 Carmen MARK    Coping skills         Relaxation techniques         Symptom management         Discharge planning   60 2 255 [facility]    60 2 1 [address] [facility]   60 1 1 volunteer   Recovery/AA/NA      volunteer   Physician medication management   15 7 1 Matteo Menendez NP   Family meeting/discharge planning   15 2 1400 [facility] and Felicity Ramirez                                  [facility] [address] [facility] -[phone]        PRN Medication Documentation    Specific patient behavior that led to need for PRN medication: 1324: Pt is praying loudly in her bed crying feeling anxious and restless  Staff interventions attempted prior to PRN being given: redirection and deep breathing   PRN medication given: Atarax and Zyprexa PO given   Patient response/effectiveness of PRN medication: 1422: Pt is sitting quietly in her bed. Medication effective.

## 2020-09-05 NOTE — BH NOTES
Admission:  Arrived ambulatory @ 2200 from [facility], [address]  Denies SI/HI  Denies AH/VH  Previous psych hx: [facility] 02/2018  Medical hx: HTN, Metabolic acidosis (resolved prior to this admit)  PTA meds: Lisinopril per pt  Pharmacy: [facility]  Upon admission pt is A&Ox4, ambulatory, cooperative, anxious        Skin Assessment:  Primary Nurse Paul Stephens RN performed a dual skin assessment on this patient No impairment noted  Rishi score is 23

## 2020-09-05 NOTE — PROGRESS NOTES
Problem: Falls - Risk of  Goal: *Absence of Falls  Description: Document Fior Mireles Fall Risk and appropriate interventions in the flowsheet. Outcome: Progressing Towards Goal  Note: Fall Risk Interventions:  Mobility Interventions: Assess mobility with egress test    Mentation Interventions: Adequate sleep, hydration, pain control    Medication Interventions: Patient to call before getting OOB, Teach patient to arise slowly    Elimination Interventions: Toilet paper/wipes in reach              Problem: Altered Thought Process (Adult/Pediatric)  Goal: *STG: Participates in treatment plan  Outcome: Progressing Towards Goal     Problem: Altered Thought Process (Adult/Pediatric)  Goal: *STG: Remains safe in hospital  Outcome: Progressing Towards Goal     Problem: Altered Thought Process (Adult/Pediatric)  Goal: *STG: Seeks staff when feelings of anxiety and fear arise  Outcome: Progressing Towards Goal     Problem: Altered Thought Process (Adult/Pediatric)  Goal: *STG: Complies with medication therapy  Outcome: Progressing Towards Goal     Problem: Altered Thought Process (Adult/Pediatric)  Goal: *STG: Decreased delusional thinking  Outcome: Progressing Towards Goal     Problem: Altered Thought Process (Adult/Pediatric)  Goal: *STG: Attends activities and groups  Outcome: Not Progressing Towards Goal    Pt is in her room comes out to the DR only for meals remains subdued and isolated to her room   Able to verbalize her feelings to staff mostly about God and praying   Alert and oriented X 4   No AH or VH per patient   Med/meal compliant   Independent in ADL's   NAD noted   Will continue to monitor.

## 2020-09-05 NOTE — H&P
INITIAL PSYCHIATRIC INTERVIEW:      CHIEF COMPLAINT:  \"I am fine, and do not know why I am here\"      HISTORY OF PRESENTING COMPLAINT:  Kianna Lopez is a 60 y.o. OTHER female who is currently admitted to the psychiatric floor at [facility]. Patient endorses she came from [place] in 2016 and has 5 children that are still in [place]. She endorses she sometimes talks to herself when she feels \"the spirit and is praying for people, but she endorses she is not crazy. \" Patient is religiously preoccupied during assessment, and states, \"I am called to pray for people and I can hear and see things at that time. \" Patient denies SI/HI at this time. She states, \"I was taking Seroquel at home but I did not need that, I am not crazy so when I was discharged from hospital I never took that. \" Patient endorses the only pill she takes is for her allergies and that is not everyday. No aggression noted at this time. Paranoid at times during assessment and looks behind her frequently. PAST PSYCHIATRIC HISTORY and SUBSTANCE ABUSE HISTORY:  Patient endorses a previous psychiatric hospitalization in 2018. Patient denies any substance abuse history. PAST MEDICAL HISTORY:  Please see H&P for details. Past Medical History:   Diagnosis Date    Hypertension     Ill-defined condition     GERD       Prior to Admission medications    Medication Sig Start Date End Date Taking? Authorizing Provider   lisinopriL (PRINIVIL, ZESTRIL) 20 mg tablet Take 20 mg by mouth daily. Yes Provider, Historical   montelukast (Singulair) 10 mg tablet Take 10 mg by mouth daily. Yes Provider, Historical   cetirizine (ZYRTEC) 10 mg tablet Take 1 Tab by mouth nightly. For allergies, instead of loratidine.  8/28/19  Yes Bethanie Hubbard MD         Lab Results   Component Value Date/Time    WBC 5.6 09/04/2020 08:20 AM    HGB 12.4 09/04/2020 08:20 AM    HCT 36.7 09/04/2020 08:20 AM    PLATELET 702 88/43/7943 08:20 AM    MCV 96.3 09/04/2020 08:20 AM      Lab Results   Component Value Date/Time    Sodium 143 09/04/2020 08:20 AM    Potassium 3.2 (L) 09/04/2020 03:33 PM    Chloride 110 (H) 09/04/2020 08:20 AM    CO2 26 09/04/2020 08:20 AM    Anion gap 7 09/04/2020 08:20 AM    Glucose 99 09/04/2020 08:20 AM    Glucose 89 02/27/2018 06:07 AM    BUN 11 09/04/2020 08:20 AM    Creatinine 1.05 (H) 09/04/2020 08:20 AM    BUN/Creatinine ratio 10 (L) 09/04/2020 08:20 AM    GFR est AA >60 09/04/2020 08:20 AM    GFR est non-AA 53 (L) 09/04/2020 08:20 AM    Calcium 8.5 09/04/2020 08:20 AM    Bilirubin, total 0.9 09/04/2020 08:20 AM    Alk. phosphatase 44 (L) 09/04/2020 08:20 AM    Protein, total 7.2 09/04/2020 08:20 AM    Albumin 3.2 (L) 09/04/2020 08:20 AM    Globulin 4.0 09/04/2020 08:20 AM    A-G Ratio 0.8 (L) 09/04/2020 08:20 AM    ALT (SGPT) 21 09/04/2020 08:20 AM      Vitals:    09/04/20 2310 09/04/20 2324 09/05/20 0702   BP: 127/84  119/81   Pulse: 85  83   Resp: 16  16   Temp: 98.6 °F (37 °C)  98.2 °F (36.8 °C)   SpO2: 98%  95%   Weight:  68.9 kg (152 lb)    Height:  5' 6\" (1.676 m)    LMP: 01/07/2014         PSYCHOSOCIAL HISTORY:  [facility]  Has 5 children      MENTAL STATUS EXAM:  General appearance: psychomotor activity is calm  Eye contact: Good eye contact  Speech: Spontaneous  Affect : Mildly Constricted  Mood: Pleasant  Thought Process: Logical, goal directed  Perception: Denies HI, +AVH   Thought Content: Denies SI or Plan  Insight: Poor  Judgement: Poor  Cognition: Impaired due to mental health      ASSESSMENT AND PLAN:  Ondina Marquez meets criteria for a diagnosis of  Schizoaffective Disorder. Seroquel 50mg q hs. Continue inpatient stay for the safety and optimum care of the patient   Routine labs to be ordered as needed. Psychotropic medications will be ordered and adjusted as needed. Patients status is TDO  Voluntary  Supportive, milieu and group therapy. Continue rest of the medications as needed.    Strengths include ability to seek help and   Estimated length of stay is 5-7 days.

## 2020-09-06 PROCEDURE — 74011250637 HC RX REV CODE- 250/637: Performed by: NURSE PRACTITIONER

## 2020-09-06 PROCEDURE — 65220000003 HC RM SEMIPRIVATE PSYCH

## 2020-09-06 RX ORDER — RISPERIDONE 1 MG/1
1 TABLET, FILM COATED ORAL
Status: DISCONTINUED | OUTPATIENT
Start: 2020-09-06 | End: 2020-09-08

## 2020-09-06 RX ADMIN — RISPERIDONE 1 MG: 1 TABLET ORAL at 20:30

## 2020-09-06 RX ADMIN — OLANZAPINE 5 MG: 5 TABLET, FILM COATED ORAL at 09:17

## 2020-09-06 RX ADMIN — HYDROXYZINE HYDROCHLORIDE 50 MG: 50 TABLET, FILM COATED ORAL at 09:17

## 2020-09-06 NOTE — INTERDISCIPLINARY ROUNDS
Behavioral Health Interdisciplinary Rounds Patient Name: Cornelio Rae  Age: 60 y.o. Room/Bed:  744/ Primary Diagnosis: <principal problem not specified> Admission Status: Voluntary Readmission within 30 days: no 
Power of  in place: no 
Patient requires a blocked bed: no          Reason for blocked bed: VTE Prophylaxis: No 
 
Mobility needs/Fall risk: no 
Flu Vaccine : no  
Nutritional Plan: no 
Consults:         
Labs/Testing due today?: yes Sleep hours: 2.5 Participation in Care/Groups:  yes Medication Compliant?: Yes PRNS (last 24 hours): Antipsychotic (PO) and Antianxiety Restraints (last 24 hours):  no 
  
CIWA (range last 24 hours): COWS (range last 24 hours): Alcohol screening (AUDIT) completed -   AUDIT Score: 0 If applicable, date SBIRT discussed in treatment team AND documented:  
AUDIT Screen Score: AUDIT Score: 0 Document Brief Intervention (corresponds directly with the 5 A's, Ask, Advise, Assess, Assist, and Arrange): At- Risk Patients (Score 7-15 for women; 8-15 for men) Discuss concern patient is drinking at unhealthy levels known to increase risk of alcohol-related health problems. Is Patient ready to commit to change? If No: 
? Encourage reflection ? Discuss short term and long term health risks of consuming alcohol ? Barriers to change ? Reaffirm willingness to help / Educational materials provided If Yes: 
? Set goal 
? Plan 
? Educational materials provided Harmful use or Dependence (Score 16 or greater) ? Discuss short term and long term health risks of consuming alcohol ? Recommendations ? Negotiate drinking goal 
? Recommend addiction specialist/center ? Arrange follow-up appointments. Tobacco - patient is a smoker: Have You Used Tobacco in the Past 30 Days: Never a smoker Illegal Drugs use: Have You Used Any Illegal Substances Over the Past 12 Months: No 
 
24 hour chart check complete: yes Patient goal(s) for today:  
Treatment team focus/goals:  
Progress note LOS:  1  Expected LOS:  
 
Financial concerns/prescription coverage:   
Family contact:      
Family requesting physician contact today:   
Discharge plan: Access to weapons :        
Outpatient provider(s):  
Patient's preferred phone number for follow up call :  
 
Participating treatment team members: Yuridia Fontana, * (assigned SW),

## 2020-09-06 NOTE — BH NOTES
Chief Complaint:  \"I am okay\"  Length of Stay: 2 Days    Interval History:  Patient endorses she has been hearing God tell her certain \"secrets\" about the staff here. Patient is actively psychotic and states, \"God told me to show you something in my room\". Patient showed staff mucus on a face mask and said it was a robot watching her and us. Patient denies SI/HI at this time but +AVH at this time and does not want to state what\"God\" is telling her at this time. No aggression noted at this time. Past Medical History:  Past Medical History:   Diagnosis Date    Hypertension     Ill-defined condition     GERD           Labs:  Lab Results   Component Value Date/Time    WBC 5.6 09/04/2020 08:20 AM    HGB 12.4 09/04/2020 08:20 AM    HCT 36.7 09/04/2020 08:20 AM    PLATELET 181 80/62/5801 08:20 AM    MCV 96.3 09/04/2020 08:20 AM      Lab Results   Component Value Date/Time    Sodium 143 09/04/2020 08:20 AM    Potassium 3.2 (L) 09/04/2020 03:33 PM    Chloride 110 (H) 09/04/2020 08:20 AM    CO2 26 09/04/2020 08:20 AM    Anion gap 7 09/04/2020 08:20 AM    Glucose 99 09/04/2020 08:20 AM    Glucose 89 02/27/2018 06:07 AM    BUN 11 09/04/2020 08:20 AM    Creatinine 1.05 (H) 09/04/2020 08:20 AM    BUN/Creatinine ratio 10 (L) 09/04/2020 08:20 AM    GFR est AA >60 09/04/2020 08:20 AM    GFR est non-AA 53 (L) 09/04/2020 08:20 AM    Calcium 8.5 09/04/2020 08:20 AM    Bilirubin, total 0.9 09/04/2020 08:20 AM    Alk.  phosphatase 44 (L) 09/04/2020 08:20 AM    Protein, total 7.2 09/04/2020 08:20 AM    Albumin 3.2 (L) 09/04/2020 08:20 AM    Globulin 4.0 09/04/2020 08:20 AM    A-G Ratio 0.8 (L) 09/04/2020 08:20 AM    ALT (SGPT) 21 09/04/2020 08:20 AM      Vitals:    09/05/20 0702 09/05/20 1535 09/05/20 1935 09/06/20 0825   BP: 119/81 116/68  131/76   Pulse: 83 (!) 108  80   Resp: 16 16 15 16   Temp: 98.2 °F (36.8 °C) 97.5 °F (36.4 °C)  97.5 °F (36.4 °C)   SpO2: 95% 96%  97%   Weight:       Height:       LMP: 01/07/2014 Current Facility-Administered Medications   Medication Dose Route Frequency Provider Last Rate Last Dose    risperiDONE (RisperDAL) tablet 1 mg  1 mg Oral QHS Endy Blair, NP        OLANZapine (ZyPREXA) tablet 5 mg  5 [facility] Oral Q6H [facility]N [facility], F[facility]   5 mg at 09/06/20 3682    haloperidol lactate (HALDOL) injection 5 mg  5 mg IntraMUSCular Q6H PRN [facility]' V, FNP        benztropine (COGENTIN) tablet 1 mg  1 mg Oral BID PRN [facility], FNP        diphenhydrAMINE (BENADRYL) injection 50 mg  50 mg IntraMUSCular BID PRN [facility]' V, FNP        hydrOXYzine HCL (ATARAX) tablet 50 mg  50 mg Oral TID PRN [facility], F[facility]   50 mg at 09/06/20 9194    LORazepam (ATIVAN) injection 1 mg  1 mg IntraMUSCular Q4H PRN [facility]' V, FNP        traZODone (DESYREL) tablet 50 mg  50 mg Oral QHS PRN [facility]' Carol Flicker, FNP        acetaminophen (TYLENOL) tablet 650 mg  650 mg Oral Q4H PRN [facility]' V, FNP        magnesium hydroxide (MILK OF MAGNESIA) 400 mg/5 mL oral suspension 30 mL  30 mL Oral DAILY PRN [facility]' Carol Flicker, FNP             Mental Status Exam:  Eye contact: good  Grooming: fair  Psychomotor activity: calm  Speech is spontaneous  Mood is mildly constricted  Affect:bizarre  Perception:+AVH  Suicidal ideation: denies si or plan  Cognition is impaired due to mental health         Physical Exam:  Body habitus: Body mass index is 24.53 kg/m². Musculoskeletal system: normal gait  Tremor - neg  Cog wheeling - neg      Assessment and Plan:  Geeta Alvarado meets criteria for a diagnosis of Schizoaffective Disorder. D/C Seroquel and start Risperdal 1mg q hs. Continue the medication regimen as prescribed  Disposition planning to continue.    I certify that this patients inpatient psychiatric hospital services furnished since the previous certification were, and continue to be, required for treatment that could reasonably be expected to improve the patient's condition, or for diagnostic study, and that the patient continues to need, on a daily basis, active treatment furnished directly by or requiring the supervision of inpatient psychiatric facility personnel. In addition, the hospital records show that services furnished were intensive treatment services, admission or related services, or equivalent services.

## 2020-09-06 NOTE — PROGRESS NOTES
Received pt lying in bed, appears to be asleep. Calm and cooperative. No distress noted. Respirations WNL. Will continue to monitor q15 for safety. Problem: Falls - Risk of  Goal: *Absence of Falls  Description: [facility] and appropriate interventions in the flowsheet. 9/5/2020 2202 by Vicky MACIEL  Outcome: Progressing Towards Goal  Note: Fall Risk Interventions:  Mobility Interventions: Assess mobility with egress test    Mentation Interventions: Adequate sleep, hydration, pain control    Medication Interventions: Teach patient to arise slowly    Elimination Interventions: Toilet paper/wipes in reach           9/5/2020 2202 by Daksha Portillo  Outcome: Progressing Towards Goal  Note: Fall Risk Interventions:  Mobility Interventions: Assess mobility with egress test    Mentation Interventions: Adequate sleep, hydration, pain control    Medication Interventions: Teach patient to arise slowly    Elimination Interventions:  Toilet paper/wipes in reach

## 2020-09-06 NOTE — BH NOTES
PRN Medication Documentation    Specific patient behavior that led to need for PRN medication: Praying very loudly in her room , crying, stomping her feet clapping her hands banging the walls  Staff interventions attempted prior to PRN being given: Distraction  PRN medication given: Zyprexa and Atarax PO given   Patient response/effectiveness of PRN medication: 1000: Pt is de-escalating slowly. Medication effective.

## 2020-09-06 NOTE — PROGRESS NOTES
Problem: Falls - Risk of  Goal: *Absence of Falls  Description: Document Anyi Elmore Fall Risk and appropriate interventions in the flowsheet. Outcome: Progressing Towards Goal  Note: Fall Risk Interventions:  Mobility Interventions: Assess mobility with egress test    Mentation Interventions: Adequate sleep, hydration, pain control    Medication Interventions: Teach patient to arise slowly    Elimination Interventions: Toilet paper/wipes in reach      0700 Rec'd patient this am, she is alert and oriented x 4, seems restless and anxious this am.  Sitting in chair rocking back and forth, kicking legs back and forth.

## 2020-09-06 NOTE — PROGRESS NOTES
Problem: Falls - Risk of  Goal: *Absence of Falls  Description: Document Diane Timmons Fall Risk and appropriate interventions in the flowsheet. Outcome: Progressing Towards Goal  Note: Fall Risk Interventions:  Mobility Interventions: Assess mobility with egress test    Mentation Interventions: Adequate sleep, hydration, pain control    Medication Interventions: Patient to call before getting OOB, Teach patient to arise slowly    Elimination Interventions: Toilet paper/wipes in reach       Problem: Altered Thought Process (Adult/Pediatric)  Goal: *STG: Participates in treatment plan  Outcome: Progressing Towards Goal  Goal: *STG: Seeks staff when feelings of anxiety and fear arise  Outcome: Progressing Towards Goal  Goal: *STG: Complies with medication therapy  Outcome: Progressing Towards Goal     Problem: Altered Thought Process (Adult/Pediatric)  Goal: *STG: Attends activities and groups  Outcome: Not Progressing Towards Goal    Blocked Bed Documentation:    Room number: 744  Type: Behavior  Rationale: Poor Self-Control  Anticipated duration: every shift.    Additional comments:Poor sleep

## 2020-09-07 LAB
CHOLEST SERPL-MCNC: 211 MG/DL
GLUCOSE P FAST SERPL-MCNC: 83 MG/DL (ref 65–100)
HDLC SERPL-MCNC: 61 MG/DL
HDLC SERPL: 3.5 {RATIO} (ref 0–5)
LDLC SERPL CALC-MCNC: 130.4 MG/DL (ref 0–100)
LIPID PROFILE,FLP: ABNORMAL
TRIGL SERPL-MCNC: 98 MG/DL (ref ?–150)
VLDLC SERPL CALC-MCNC: 19.6 MG/DL

## 2020-09-07 PROCEDURE — 74011250637 HC RX REV CODE- 250/637: Performed by: NURSE PRACTITIONER

## 2020-09-07 PROCEDURE — 82947 ASSAY GLUCOSE BLOOD QUANT: CPT

## 2020-09-07 PROCEDURE — 65220000003 HC RM SEMIPRIVATE PSYCH

## 2020-09-07 PROCEDURE — 36415 COLL VENOUS BLD VENIPUNCTURE: CPT

## 2020-09-07 PROCEDURE — 80061 LIPID PANEL: CPT

## 2020-09-07 RX ADMIN — RISPERIDONE 1 MG: 1 TABLET ORAL at 21:22

## 2020-09-07 RX ADMIN — HYDROXYZINE HYDROCHLORIDE 50 MG: 50 TABLET, FILM COATED ORAL at 12:26

## 2020-09-07 RX ADMIN — OLANZAPINE 5 MG: 5 TABLET, FILM COATED ORAL at 12:26

## 2020-09-07 NOTE — INTERDISCIPLINARY ROUNDS
Behavioral Health Interdisciplinary Rounds Patient Name: Janki Kolb  Age: 60 y.o. Room/Bed:  4/ Primary Diagnosis: <principal problem not specified> Admission Status: Voluntary Readmission within 30 days: no 
Power of  in place: no 
Patient requires a blocked bed: no          Reason for blocked bed: VTE Prophylaxis: No 
 
Mobility needs/Fall risk: no 
Flu Vaccine : no  
Nutritional Plan: no 
Consults:         
Labs/Testing due today?: yes Sleep hours:  8 Participation in Care/Groups:  yes Medication Compliant?: Yes PRNS (last 24 hours): Antipsychotic (PO) and Antianxiety Restraints (last 24 hours):  no 
  
CIWA (range last 24 hours): COWS (range last 24 hours): Alcohol screening (AUDIT) completed -   AUDIT Score: 0 If applicable, date SBIRT discussed in treatment team AND documented:  
AUDIT Screen Score: AUDIT Score: 0 Tobacco - patient is a smoker: Have You Used Tobacco in the Past 30 Days: Never a smoker Illegal Drugs use: Have You Used Any Illegal Substances Over the Past 12 Months: No 
 
24 hour chart check complete: yes Patient goal(s) for today: attend groups, take medications Treatment team focus/goals: titrate medication, coordinate follow up. Progress note:  Pt is alert but disengaged and does not interact with treatment team. She is voluntary but is refusing medications and staff report she remains psychotic, hyper Buddhist, and AH of \"God's voice\". LOS:  3  Expected LOS: TBD Financial concerns/prescription coverage:  UNINSURED Family contact: Inez Hatchet (close friend & roomate) ([phone]) Family requesting physician contact today:  no 
Discharge plan: return to friends home to live Access to weapons : na Outpatient provider(s): to be linked Patient's preferred phone number for follow up call : [phone] Participating treatment team members: Hussein Dennison [id number] Sharron Barahona NP; Selma Alvarado RN; Debbi Singh, MSW

## 2020-09-07 NOTE — BH NOTES
Received on acute unit at 1455. Patient sitting at bedside reading Bible. Was nonverbal when 1;1 communication was attempted.

## 2020-09-07 NOTE — PROGRESS NOTES
Received pt lying in bed, appears to be asleep. Calm and cooperative. No distress noted. Respirations even and unlabored. Will continue to monitor q15 for safety. Problem: Falls - Risk of  Goal: *Absence of Falls  Description: Document Chuckranulfo Pedraza Fall Risk and appropriate interventions in the flowsheet. Outcome: Progressing Towards Goal  Note: Fall Risk Interventions:  Mobility Interventions: Assess mobility with egress test    Mentation Interventions: Adequate sleep, hydration, pain control    Medication Interventions: Patient to call before getting OOB, Teach patient to arise slowly    Elimination Interventions:  Toilet paper/wipes in reach

## 2020-09-07 NOTE — PROGRESS NOTES
Problem: Falls - Risk of  Goal: *Absence of Falls  Description: Document [facility] Fall Risk and appropriate interventions in the flowsheet. Outcome: Progressing Towards Goal  Note: Fall Risk Interventions:  Mobility Interventions: Assess mobility with egress test    Mentation Interventions: Adequate sleep, hydration, pain control    Medication Interventions: Patient to call before getting OOB, Teach patient to arise slowly    Elimination Interventions: Toilet paper/wipes in reach    0800: Patient awake and alert and sitting quietly in room, praying. Mood and affect; isolative towards room, religiously occupied, however cooperative. Denies SI/HI. Will continue to monitor q15 minutes for safety checks. Blocked Bed Documentation:    Room number: 744  Type: Behavior  Rationale: Impulsive, Labile, Unpredictable   Anticipated duration: Hospital Duration  Additional comments: Pt religiously preoccupied, often yelling and screaming chants/praying      1030: Per Lisa Torres NP, patient to be assessed for TDO by [place]. Scanned paperwork over @ [phone]. Waiting to hear back.

## 2020-09-07 NOTE — BH NOTES
Chief Complaint:  Unable to obtain. Patient non verbal.    Length of Stay: 3 Days    Interval History:  Ms. María Valdivia is sitting in her room, non verbal, she looked at me briefly but did not answer any of my questions. No interaction is possible with her. Nursing staff report she remains psychotic, hyper Holiness, hearing God. She has been refusing her meds. She is on a voluntary basis. We will request for tdo eval.      Past Medical History:  Past Medical History:   Diagnosis Date    Hypertension     Ill-defined condition     GERD           Labs:  Lab Results   Component Value Date/Time    WBC 5.6 09/04/2020 08:20 AM    HGB 12.4 09/04/2020 08:20 AM    HCT 36.7 09/04/2020 08:20 AM    PLATELET 235 90/93/6885 08:20 AM    MCV 96.3 09/04/2020 08:20 AM      Lab Results   Component Value Date/Time    Sodium 143 09/04/2020 08:20 AM    Potassium 3.2 (L) 09/04/2020 03:33 PM    Chloride 110 (H) 09/04/2020 08:20 AM    CO2 26 09/04/2020 08:20 AM    Anion gap 7 09/04/2020 08:20 AM    Glucose 83 09/07/2020 06:17 AM    BUN 11 09/04/2020 08:20 AM    Creatinine 1.05 (H) 09/04/2020 08:20 AM    BUN/Creatinine ratio 10 (L) 09/04/2020 08:20 AM    GFR est AA >60 09/04/2020 08:20 AM    GFR est non-AA 53 (L) 09/04/2020 08:20 AM    Calcium 8.5 09/04/2020 08:20 AM    Bilirubin, total 0.9 09/04/2020 08:20 AM    Alk.  phosphatase 44 (L) 09/04/2020 08:20 AM    Protein, total 7.2 09/04/2020 08:20 AM    Albumin 3.2 (L) 09/04/2020 08:20 AM    Globulin 4.0 09/04/2020 08:20 AM    A-G Ratio 0.8 (L) 09/04/2020 08:20 AM    ALT (SGPT) 21 09/04/2020 08:20 AM      Vitals:    09/05/20 1935 09/06/20 0825 09/06/20 1612 09/07/20 0738   BP:  131/76 103/66 129/73   Pulse:  80 82 69   Resp: 15 16 16 16   Temp:  97.5 °F (36.4 °C) 98.5 °F (36.9 °C) 97.9 °F (36.6 °C)   SpO2:  97% 100% 97%   Weight:   74.1 kg (163 lb 7 oz)    Height:       LMP: 01/07/2014         Current Facility-Administered Medications   Medication Dose Route Frequency Provider Last Rate Last Dose    risperiDONE (RisperDAL) tablet 1 mg  1 mg Oral QHS Freda Blair Shauna, NP   1 mg at 09/06/20 2030    OLANZapine (ZyPREXA) tablet 5 mg  5 mg Oral Q6H PRN Janie Liming' V, FNP   5 mg at 09/07/20 1226    haloperidol lactate (HALDOL) injection 5 mg  5 mg IntraMUSCular Q6H PRN Janie Liming' V, FNP        benztropine (COGENTIN) tablet 1 mg  1 mg Oral BID PRN Janie LimingAnice Manger, FNP        diphenhydrAMINE (BENADRYL) injection 50 mg  50 mg IntraMUSCular BID PRN Janie Liming' V, FNP        hydrOXYzine HCL (ATARAX) tablet 50 mg  50 mg Oral TID PRN Janie Liming' V, FNP   50 mg at 09/07/20 1226    LORazepam (ATIVAN) injection 1 mg  1 mg IntraMUSCular Q4H PRN Janie Liming' V, FNP        traZODone (DESYREL) tablet 50 mg  50 mg Oral QHS PRN Janie Liming' Giovanny Mcburney, FNP        acetaminophen (TYLENOL) tablet 650 mg  650 mg Oral Q4H PRN Janie Liming' V, FNP        magnesium hydroxide (MILK OF MAGNESIA) 400 mg/5 mL oral suspension 30 mL  30 mL Oral DAILY PRN Janie Liming' Giovanny Mcburney, FNP             Mental Status Exam:  Eye contact: good  Grooming: fair  Psychomotor activity: calm  Speech is spontaneous  Mood is mildly constricted  Affect:bizarre  Perception:+AVH  Suicidal ideation: denies si or plan  Cognition is impaired due to mental health         Physical Exam:  Body habitus: Body mass index is 26.38 kg/m². Musculoskeletal system: normal gait  Tremor - neg  Cog wheeling - neg      Assessment and Plan:  Flaco Carter meets criteria for a diagnosis of Schizoaffective Disorder. TDO eval..  Continue the medication regimen as prescribed  Disposition planning to continue.      I certify that this patients inpatient psychiatric hospital services furnished since the previous certification were, and continue to be, required for treatment that could reasonably be expected to improve the patient's condition, or for diagnostic study, and that the patient continues to need, on a daily basis, active treatment furnished directly by or requiring the supervision of inpatient psychiatric facility personnel. In addition, the hospital records show that services furnished were intensive treatment services, admission or related services, or equivalent services.

## 2020-09-07 NOTE — BH NOTES
PRN Medication Documentation    Specific patient behavior that led to need for PRN medication: Chanting loudly, praying, stomping feet, clapping hands     Staff interventions attempted prior to PRN being given: Therapeutic communication, de-escalation     PRN medication given: Atarax 50mg PO/ Zyprexa 5mg PO     Patient response/effectiveness of PRN medication: Will continue to monitor.

## 2020-09-07 NOTE — BH NOTES
GROUP THERAPY PROGRESS NOTE    Teetee Child did not participate in Leisure-Creative Group. Group time: 1 hour    Personal goal for participation: to relax and socialize with peers    Goal orientation: relaxation    Group therapy participation: did not attend groups    Therapeutic interventions reviewed and discussed: Jolene Vargas on TV    Impression of participation: Patient went to bed after dinner, did not attend Leisure Groups.

## 2020-09-07 NOTE — BH NOTES
Pt observed sitting in chair in patients room. Pt responding to internal stimuli. Pt refused afternoon vitals at this time. Will attempt later. 1352:Pt in room pacing,thumping the ground, incomprehensbile words,chanting.

## 2020-09-07 NOTE — PROGRESS NOTES
Problem: Altered Thought Process (Adult/Pediatric)  Goal: *STG: Seeks staff when feelings of anxiety and fear arise  Outcome: Not Progressing Towards Goal  Note: Approaches staff with Anglican delusion of God telling her she should be released from here. She is redirectable and cooperative however. Calm and does not cause behavioral issues on acute. Med and meal compliant this evening. Ate 100% of dinner this evening and had 720ml fluid intake. Education provided. Continue to monitor.   Goal: *STG: Complies with medication therapy  Outcome: Progressing Towards Goal  Goal: *STG: Decreased delusional thinking  Outcome: Not Progressing Towards Goal  Goal: Interventions  Outcome: Progressing Towards Goal

## 2020-09-08 LAB
ATRIAL RATE: 75 BPM
CALCULATED P AXIS, ECG09: 53 DEGREES
CALCULATED R AXIS, ECG10: 27 DEGREES
CALCULATED T AXIS, ECG11: 30 DEGREES
DIAGNOSIS, 93000: NORMAL
P-R INTERVAL, ECG05: 150 MS
Q-T INTERVAL, ECG07: 410 MS
QRS DURATION, ECG06: 80 MS
QTC CALCULATION (BEZET), ECG08: 457 MS
VENTRICULAR RATE, ECG03: 75 BPM

## 2020-09-08 PROCEDURE — 74011250637 HC RX REV CODE- 250/637: Performed by: NURSE PRACTITIONER

## 2020-09-08 PROCEDURE — 93005 ELECTROCARDIOGRAM TRACING: CPT

## 2020-09-08 PROCEDURE — 65220000003 HC RM SEMIPRIVATE PSYCH

## 2020-09-08 RX ORDER — LISINOPRIL 10 MG/1
10 TABLET ORAL DAILY
Status: DISCONTINUED | OUTPATIENT
Start: 2020-09-08 | End: 2020-09-09

## 2020-09-08 RX ORDER — RISPERIDONE 1 MG/1
1 TABLET, FILM COATED ORAL 2 TIMES DAILY
Status: DISCONTINUED | OUTPATIENT
Start: 2020-09-08 | End: 2020-09-10 | Stop reason: HOSPADM

## 2020-09-08 RX ADMIN — RISPERIDONE 1 MG: 1 TABLET ORAL at 11:57

## 2020-09-08 RX ADMIN — HYDROXYZINE HYDROCHLORIDE 50 MG: 50 TABLET, FILM COATED ORAL at 16:57

## 2020-09-08 RX ADMIN — LISINOPRIL 10 MG: 10 TABLET ORAL at 11:57

## 2020-09-08 RX ADMIN — OLANZAPINE 5 MG: 5 TABLET, FILM COATED ORAL at 16:57

## 2020-09-08 RX ADMIN — RISPERIDONE 1 MG: 1 TABLET ORAL at 20:00

## 2020-09-08 NOTE — PROGRESS NOTES
Laboratory Monitoring for Antipsychotics: This patient is currently prescribed the following medication(s):   Current Facility-Administered Medications   Medication Dose Route Frequency    risperiDONE (RisperDAL) tablet 1 mg  1 mg Oral QHS     The following labs have been completed for monitoring of antipsychotics and/or mood stabilizers:    Height, Weight, BMI Estimation  Estimated body mass index is 26.38 kg/m² as calculated from the following:    Height as of this encounter: 167.6 cm (66\"). Weight as of this encounter: 74.1 kg (163 lb 7 oz). Vital Signs/Blood Pressure  Visit Vitals  BP (!) 158/92 (BP 1 Location: Left arm, BP Patient Position: Sitting)   Pulse 83   Temp 98 °F (36.7 °C)   Resp 16   Ht 167.6 cm (66\")   Wt 74.1 kg (163 lb 7 oz)   LMP 01/07/2014 (Approximate)   SpO2 99%   BMI 26.38 kg/m²     Renal Function, Hepatic Function and Chemistry  Estimated Creatinine Clearance: 55.7 mL/min (A) (by C-G formula based on SCr of 1.05 mg/dL (H)). Lab Results   Component Value Date/Time    Sodium 143 09/04/2020 08:20 AM    Potassium 3.2 (L) 09/04/2020 03:33 PM    Chloride 110 (H) 09/04/2020 08:20 AM    CO2 26 09/04/2020 08:20 AM    Anion gap 7 09/04/2020 08:20 AM    BUN 11 09/04/2020 08:20 AM    Creatinine 1.05 (H) 09/04/2020 08:20 AM    BUN/Creatinine ratio 10 (L) 09/04/2020 08:20 AM    Bilirubin, total 0.9 09/04/2020 08:20 AM    Protein, total 7.2 09/04/2020 08:20 AM    Albumin 3.2 (L) 09/04/2020 08:20 AM    Globulin 4.0 09/04/2020 08:20 AM    A-G Ratio 0.8 (L) 09/04/2020 08:20 AM    ALT (SGPT) 21 09/04/2020 08:20 AM    AST (SGOT) 19 09/04/2020 08:20 AM    Alk.  phosphatase 44 (L) 09/04/2020 08:20 AM     Lab Results   Component Value Date/Time    Glucose 83 09/07/2020 06:17 AM    Glucose POC NF 87 05/22/2019 10:26 AM     Lab Results   Component Value Date/Time    Hemoglobin A1c 4.9 02/27/2018 06:07 AM     Hematology  Lab Results   Component Value Date/Time    WBC 5.6 09/04/2020 08:20 AM    RBC 3.81 09/04/2020 08:20 AM    HGB 12.4 09/04/2020 08:20 AM    HCT 36.7 09/04/2020 08:20 AM    MCV 96.3 09/04/2020 08:20 AM    MCH 32.5 09/04/2020 08:20 AM    MCHC 33.8 09/04/2020 08:20 AM    RDW 11.9 09/04/2020 08:20 AM    PLATELET 733 71/73/9295 08:20 AM     Lipids  Lab Results   Component Value Date/Time    Cholesterol, total 211 (H) 09/07/2020 06:17 AM    HDL Cholesterol 61 09/07/2020 06:17 AM    LDL, calculated 130.4 (H) 09/07/2020 06:17 AM    Triglyceride 98 09/07/2020 06:17 AM    CHOL/HDL Ratio 3.5 09/07/2020 06:17 AM     Thyroid Function  Lab Results   Component Value Date/Time    TSH 3.45 09/03/2020 09:03 AM     Assessment/Plan:  Recommended baseline laboratory monitoring has been completed based on this patient's current medication regimen. The patient's estimated 10-year ASCVD risk is 12.3%; therefore, the patient is a candidate for a high-intensity statin. Recommend initiation of atorvastatin 40mg daily. Follow-up metabolic monitoring labs should be completed in 3 months (quarterly for the first year of antipsychotic therapy).      VIRGINIA McmahonD
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
## 2020-09-08 NOTE — INTERDISCIPLINARY ROUNDS
Behavioral Health Interdisciplinary Rounds Patient Name: Deborah Lee  Age: 60 y.o. Room/Bed:  726/ Primary Diagnosis: <principal problem not specified> Admission Status: Voluntary Readmission within 30 days: no 
Power of  in place: no 
Patient requires a blocked bed: yes          Reason for blocked bed: behavior VTE Prophylaxis: No 
 
Mobility needs/Fall risk: no 
Flu Vaccine : no  
Nutritional Plan: no 
Consults:         
Labs/Testing due today?: no 
 
Sleep hours:  3 Participation in Care/Groups:  yes Medication Compliant?: Yes PRNS (last 24 hours): Antipsychotic (PO) and Antianxiety Restraints (last 24 hours):  no 
  
CIWA (range last 24 hours): COWS (range last 24 hours): Alcohol screening (AUDIT) completed -   AUDIT Score: 0 If applicable, date SBIRT discussed in treatment team AND documented:  
AUDIT Screen Score: AUDIT Score: 0 Tobacco - patient is a smoker: Have You Used Tobacco in the Past 30 Days: Never a smoker Illegal Drugs use: Have You Used Any Illegal Substances Over the Past 12 Months: No 
 
24 hour chart check complete: yes Patient goal(s) for today: attend groups, take medications Treatment team focus/goals: titrate medication, coordinate follow up. 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 She is not progressing. Explained that the [employer] [address] [employer] instructs her what to do, and yesterday she was asked by the [address] [place], [place] [address] to pray for everybody. LOS:  4  Expected LOS: TBD Financial concerns/prescription coverage:  UNINSURED Family contact: Charlotte Jerez (close friend & roomate) ([phone]) Family requesting physician contact today:  no 
Discharge plan: return to friends home to live Access to weapons : na Outpatient provider(s): to be linked Patient's preferred phone number for follow up call : [phone] Participating treatment team members: Hussein Zambrano [id number] Rosario NP; Micheline Domingo RN; Darlene Rueda, PharmD; Matthew Calixto MSW

## 2020-09-08 NOTE — PROGRESS NOTES
Pt received resting quietly in bed. Respirations even and unlabored, NAD noted. Staff to continue q15 minute checks for safety. Problem: Falls - Risk of  Goal: *Absence of Falls  Description: Document Gelacio Serratonce Fall Risk and appropriate interventions in the flowsheet. Outcome: Progressing Towards Goal  Note: Fall Risk Interventions:  Mobility Interventions: Assess mobility with egress test    Mentation Interventions: Adequate sleep, hydration, pain control    Medication Interventions: Teach patient to arise slowly    Elimination Interventions:  Toilet paper/wipes in reach           Blocked Bed Documentation:    Room number: 726  Type: Behavior  Rationale: Poor Self-Control  Anticipated duration: TBD  Additional comments:Pt religiously preoccupied, poor self-control and intrusive- dancing, clapping, chanting in room

## 2020-09-08 NOTE — PROGRESS NOTES
Problem: Altered Thought Process (Adult/Pediatric)  Goal: *STG: Seeks staff when feelings of anxiety and fear arise  Stated \"God is directing me to pray for people. I get arrow symbols on  my body when I do. \"    Problem: Altered Thought Process (Adult/Pediatric)  Goal: *STG: Complies with medication therapy  Outcome: Progressing Towards Goal   STATED \"God will tell me thru prayer when to take the medicine. \"    Problem: Altered Thought Process (Adult/Pediatric)  Goal: *STG: Decreased delusional thinking  Outcome: Not Progressing Towards Goal  Religiously preoccupied and delusional

## 2020-09-08 NOTE — PROGRESS NOTES
Problem: Altered Thought Process (Adult/Pediatric)  Goal: *STG: Participates in treatment plan  Outcome: Progressing Towards Goal  Variance Patient slowly responding  Pt complies with medication. Affect is labile. She demonstrates religiosity, with rapid pressured speech.

## 2020-09-08 NOTE — BH NOTES
PRN Medication Documentation    Specific patient behavior that led to need for PRN medication: pt appears anxious,paranoid,irritable . Internalizing? Staff interventions attempted prior to PRN being given: coping skills  PRN medication given: atarax,zyprexa Patient response/effectiveness of PRN medication:tl aware

## 2020-09-08 NOTE — PROGRESS NOTES
Problem: Discharge Planning  Goal: *Discharge to safe environment  Outcome: Not Progressing Towards Goal  Note: Patient is unable to identify a stable home for herself. Goal: *Knowledge of medication management  Outcome: Progressing Towards Goal  Note: Pt agrees to take medication as prescribed  Goal: *Knowledge of discharge instructions  Outcome: Progressing Towards Goal  Note: Pt verbalized understanding of goals for tx and discharge.

## 2020-09-08 NOTE — PROGRESS NOTES
Blocked Bed Documentation:    Room number: 726-02  Type: Behavior  Rationale: Impulsive  Anticipated duration: TBD  Additional comments:Exremely religiously preoccupied. Found in room multiple times, dancing, clapping, and chanting.

## 2020-09-09 PROCEDURE — 65220000003 HC RM SEMIPRIVATE PSYCH

## 2020-09-09 PROCEDURE — 74011250637 HC RX REV CODE- 250/637: Performed by: NURSE PRACTITIONER

## 2020-09-09 RX ORDER — LISINOPRIL 10 MG/1
20 TABLET ORAL DAILY
Status: DISCONTINUED | OUTPATIENT
Start: 2020-09-10 | End: 2020-09-10 | Stop reason: HOSPADM

## 2020-09-09 RX ORDER — ATORVASTATIN CALCIUM 10 MG/1
20 TABLET, FILM COATED ORAL
Status: DISCONTINUED | OUTPATIENT
Start: 2020-09-09 | End: 2020-09-10 | Stop reason: HOSPADM

## 2020-09-09 RX ORDER — LORATADINE 10 MG/1
10 TABLET ORAL DAILY
Status: DISCONTINUED | OUTPATIENT
Start: 2020-09-09 | End: 2020-09-10 | Stop reason: HOSPADM

## 2020-09-09 RX ORDER — CLONIDINE HYDROCHLORIDE 0.1 MG/1
0.1 TABLET ORAL
Status: DISCONTINUED | OUTPATIENT
Start: 2020-09-09 | End: 2020-09-10 | Stop reason: HOSPADM

## 2020-09-09 RX ADMIN — RISPERIDONE 1 MG: 1 TABLET ORAL at 21:03

## 2020-09-09 RX ADMIN — HYDROXYZINE HYDROCHLORIDE 50 MG: 50 TABLET, FILM COATED ORAL at 17:00

## 2020-09-09 RX ADMIN — RISPERIDONE 1 MG: 1 TABLET ORAL at 08:12

## 2020-09-09 RX ADMIN — LORATADINE 10 MG: 10 TABLET ORAL at 12:25

## 2020-09-09 RX ADMIN — ATORVASTATIN CALCIUM 20 MG: 10 TABLET, FILM COATED ORAL at 21:03

## 2020-09-09 RX ADMIN — CLONIDINE HYDROCHLORIDE 0.1 MG: 0.1 TABLET ORAL at 20:19

## 2020-09-09 RX ADMIN — LISINOPRIL 10 MG: 10 TABLET ORAL at 08:13

## 2020-09-09 NOTE — PROGRESS NOTES
Pt received resting quietly in bed. Respirations even and unlabored, NAD noted. Staff to continue q15 minute checks for safety. Problem: Falls - Risk of  Goal: *Absence of Falls  Description: [facility] and appropriate interventions in the flowsheet. Outcome: Progressing Towards Goal  Note: Fall Risk Interventions:  Mobility Interventions: Assess mobility with egress test    Mentation Interventions: Adequate sleep, hydration, pain control    Medication Interventions: Teach patient to arise slowly    Elimination Interventions:  Toilet paper/wipes in reach

## 2020-09-09 NOTE — BH NOTES
Chief Complaint:  I am doing ok. Length of Stay: 5 Days    Interval History:  Ms. Oscar Villanueva states she is doing good. Remains hyper Rastafari but calm and pleasant. 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 She is visible in the unit, no management issue. Her bp has been running a little high, will increase her lisinopril to 20mg. States she can stay with a friend after discharge, CM will reach out to her friend. She was previously on risperdal 4mg, we will increase her dose tomorrow. Denies si or hi. Denies medication adverse effects. Past Medical History:  Past Medical History:   Diagnosis Date    Hypertension     Ill-defined condition     GERD           Labs:  Lab Results   Component Value Date/Time    WBC 5.6 09/04/2020 08:20 AM    HGB 12.4 09/04/2020 08:20 AM    HCT 36.7 09/04/2020 08:20 AM    PLATELET 452 61/93/7544 08:20 AM    MCV 96.3 09/04/2020 08:20 AM      Lab Results   Component Value Date/Time    Sodium 143 09/04/2020 08:20 AM    Potassium 3.2 (L) 09/04/2020 03:33 PM    Chloride 110 (H) 09/04/2020 08:20 AM    CO2 26 09/04/2020 08:20 AM    Anion gap 7 09/04/2020 08:20 AM    Glucose 83 09/07/2020 06:17 AM    BUN 11 09/04/2020 08:20 AM    Creatinine 1.05 (H) 09/04/2020 08:20 AM    BUN/Creatinine ratio 10 (L) 09/04/2020 08:20 AM    GFR est AA >60 09/04/2020 08:20 AM    GFR est non-AA 53 (L) 09/04/2020 08:20 AM    Calcium 8.5 09/04/2020 08:20 AM    Bilirubin, total 0.9 09/04/2020 08:20 AM    Alk.  phosphatase 44 (L) 09/04/2020 08:20 AM    Protein, total 7.2 09/04/2020 08:20 AM    Albumin 3.2 (L) 09/04/2020 08:20 AM    Globulin 4.0 09/04/2020 08:20 AM    A-G Ratio 0.8 (L) 09/04/2020 08:20 AM    ALT (SGPT) 21 09/04/2020 08:20 AM      Vitals:    09/08/20 1819 09/08/20 2014 09/08/20 2129 09/09/20 0851   BP: (!) 175/95 150/86 (!) 165/91 152/54   Pulse: 92 80 75 76   Resp: 16 16 16 16   Temp: 98.5 °F (36.9 °C)  97.5 °F (36.4 °C) 98.2 °F (36.8 °C)   SpO2: 100% 100% 100% 100%   Weight:       Height:       LMP: 01/07/2014         Current Facility-Administered Medications   Medication Dose Route Frequency Provider Last Rate Last Dose    cloNIDine HCL (CATAPRES) tablet 0.1 mg  0.1 mg Oral Q6H PRN Divya Gongora NP        [START ON 9/10/2020] lisinopriL (PRINIVIL, ZESTRIL) tablet 20 mg  20 mg Oral DAILY Divya Gongora NP        atorvastatin (LIPITOR) tablet 20 mg  20 mg Oral QHS Divya Gongora NP        risperiDONE (RisperDAL) tablet 1 mg  1 mg Oral BID Divya Gongora NP   1 mg at 09/09/20 0812    OLANZapine (ZyPREXA) tablet 5 mg  5 mg Oral Q6H PRN Lalit Dixon V, FNP   5 mg at 09/08/20 1657    haloperidol lactate (HALDOL) injection 5 mg  5 mg IntraMUSCular Q6H PRN Lalit Dixon V, FNP        benztropine (COGENTIN) tablet 1 mg  1 mg Oral BID PRN Lalit Coyne, FNP        diphenhydrAMINE (BENADRYL) injection 50 mg  50 mg IntraMUSCular BID PRN Lalit Dixon V, FNP        hydrOXYzine HCL (ATARAX) tablet 50 mg  50 mg Oral TID PRN Lalit Dixon V, FNP   50 mg at 09/08/20 1657    LORazepam (ATIVAN) injection 1 mg  1 mg IntraMUSCular Q4H PRN Lalit Dixon V, FNP        traZODone (DESYREL) tablet 50 mg  50 mg Oral QHS PRN Lalit Rodriguez, BEANP        acetaminophen (TYLENOL) tablet 650 mg  650 mg Oral Q4H PRN Lalit Dixon V, FNP        magnesium hydroxide (MILK OF MAGNESIA) 400 mg/5 mL oral suspension 30 mL  30 mL Oral DAILY PRN Lalit Rodriguez, BEANP             Mental Status Exam:  Eye contact: good  Grooming: fair  Psychomotor activity: calm  Speech is spontaneous  Mood is mildly constricted  Affect:bizarre  Perception:+AVH  Suicidal ideation: denies si or plan  Cognition is impaired due to mental health         Physical Exam:  Body habitus: Body mass index is 26.38 kg/m².   Musculoskeletal system: normal gait  Tremor - neg  Cog wheeling - neg      Assessment and Plan:  Kianna Lopez meets criteria for a diagnosis of Schizoaffective Disorder. Increase lisinopril to 20mg daily. Continue the medication regimen as prescribed  Disposition planning to continue. I certify that this patients inpatient psychiatric hospital services furnished since the previous certification were, and continue to be, required for treatment that could reasonably be expected to improve the patient's condition, or for diagnostic study, and that the patient continues to need, on a daily basis, active treatment furnished directly by or requiring the supervision of inpatient psychiatric facility personnel. In addition, the hospital records show that services furnished were intensive treatment services, admission or related services, or equivalent services.

## 2020-09-09 NOTE — BH NOTES
Both staff attempted to get labs on pt. . unable to get. Pt states she has not been drinking water and will drink more water today. Pt agrees to have labs drawn tomorrow morning.

## 2020-09-09 NOTE — PROGRESS NOTES
Problem: Altered Thought Process (Adult/Pediatric)  Goal: *STG: Participates in treatment plan  Outcome: Progressing Towards Goal  Pt complies with medications. She is able to concentrate on tasks without interruption from internal stimuli. Focus is on discharge planning.

## 2020-09-09 NOTE — PROGRESS NOTES
Problem: Altered Thought Process (Adult/Pediatric)  Goal: *STG: Participates in treatment plan  Outcome: Progressing Towards Goal  Goal: *STG: Complies with medication therapy  Outcome: Progressing Towards Goal  Goal: *STG: Attends activities and groups  Outcome: Progressing Towards Goal  Goal: *STG: Decreased delusional thinking  Outcome: Progressing Towards Goal    Patient is calm and cooperative. Medication compliant. Visible in unit, engages briefly in conversation with others. Denies si/hi. Informs treatment team that she has good friend who she can stay with. Describes episodes of \"heat under my skin\", anxiousness.

## 2020-09-09 NOTE — BH NOTES
PRN Medication Documentation    Specific patient behavior that led to need for PRN medication:c/o anxiety  Staff interventions attempted prior to PRN being given:coping skillsPRN medication given:atarax Patient response/effectiveness of PRN medication: jorge aware

## 2020-09-09 NOTE — INTERDISCIPLINARY ROUNDS
Behavioral Health Interdisciplinary Rounds Patient Name: Jose Maria Hayward  Age: 60 y.o. Room/Bed:  726/01 Primary Diagnosis: <principal problem not specified> Admission Status: Voluntary Readmission within 30 days: no 
Power of  in place: no 
Patient requires a blocked bed: yes          Reason for blocked bed: behavior VTE Prophylaxis: No 
 
Mobility needs/Fall risk: no 
Flu Vaccine : no  
Nutritional Plan: no 
Consults:         
Labs/Testing due today?: yes-unable to obtain, see note by BIRGIT White Sleep hours:  5.5 Participation in Care/Groups:  yes Medication Compliant?: Yes PRNS (last 24 hours): Antipsychotic (PO) and Antianxiety Restraints (last 24 hours):  no 
  
CIWA (range last 24 hours): COWS (range last 24 hours): Alcohol screening (AUDIT) completed -   AUDIT Score: 0 If applicable, date SBIRT discussed in treatment team AND documented:  
AUDIT Screen Score: AUDIT Score: 0 Tobacco - patient is a smoker: Have You Used Tobacco in the Past 30 Days: Never a smoker Illegal Drugs use: Have You Used Any Illegal Substances Over the Past 12 Months: No 
 
24 hour chart check complete: yes Patient goal(s) for today: attend groups, take medications Treatment team focus/goals: titrate medication, coordinate follow up. Progress note: Pt is alert and oriented. Blood pressure and cholesterol are high. Explained that yesterday she started to feel \"feverish\" and shivering when she got a hold of her phone. 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 They paid for her accommodations. Has no insurance or benefits. MSW ([facility]) called Dora Robin who is pt's FRIEND, and she said that HILARY can come stay with her for as long as she needs.  She also expressed that she was willing to help her take care of her medications and doctor appointments. Her once concern was whether pt was compliant to taking her medications. LOS:  5  Expected LOS: TBD Financial concerns/prescription coverage:  UNINSURED Family contact: Leila Krueger (close friend & roomate) ([phone]) Family requesting physician contact today:  no 
Discharge plan: return to friends home to live Access to weapons : na Outpatient provider(s): to be linked Patient's preferred phone number for follow up call : [phone] Participating treatment team members: Geeta Alvarado, Marti Walden, CHANDNI; MARYCHUY Lynne RN; Marco Lopez, PharmD; Junaid Conte MSW

## 2020-09-10 VITALS
TEMPERATURE: 98.4 F | BODY MASS INDEX: 26.27 KG/M2 | SYSTOLIC BLOOD PRESSURE: 122 MMHG | RESPIRATION RATE: 16 BRPM | DIASTOLIC BLOOD PRESSURE: 87 MMHG | HEART RATE: 75 BPM | HEIGHT: 66 IN | WEIGHT: 163.44 LBS | OXYGEN SATURATION: 99 %

## 2020-09-10 LAB
ALBUMIN SERPL-MCNC: 2.8 G/DL (ref 3.5–5)
ALBUMIN/GLOB SERPL: 0.7 {RATIO} (ref 1.1–2.2)
ALP SERPL-CCNC: 40 U/L (ref 45–117)
ALT SERPL-CCNC: 20 U/L (ref 12–78)
ANION GAP SERPL CALC-SCNC: 6 MMOL/L (ref 5–15)
AST SERPL-CCNC: 14 U/L (ref 15–37)
BILIRUB SERPL-MCNC: 0.4 MG/DL (ref 0.2–1)
BUN SERPL-MCNC: 13 MG/DL (ref 6–20)
BUN/CREAT SERPL: 14 (ref 12–20)
CALCIUM SERPL-MCNC: 8.8 MG/DL (ref 8.5–10.1)
CHLORIDE SERPL-SCNC: 106 MMOL/L (ref 97–108)
CO2 SERPL-SCNC: 31 MMOL/L (ref 21–32)
CREAT SERPL-MCNC: 0.9 MG/DL (ref 0.55–1.02)
GLOBULIN SER CALC-MCNC: 3.8 G/DL (ref 2–4)
GLUCOSE SERPL-MCNC: 84 MG/DL (ref 65–100)
POTASSIUM SERPL-SCNC: 3.6 MMOL/L (ref 3.5–5.1)
PROT SERPL-MCNC: 6.6 G/DL (ref 6.4–8.2)
SODIUM SERPL-SCNC: 143 MMOL/L (ref 136–145)
VIT B12 SERPL-MCNC: 1328 PG/ML (ref 193–986)

## 2020-09-10 PROCEDURE — 80053 COMPREHEN METABOLIC PANEL: CPT

## 2020-09-10 PROCEDURE — 82607 VITAMIN B-12: CPT

## 2020-09-10 PROCEDURE — 36415 COLL VENOUS BLD VENIPUNCTURE: CPT

## 2020-09-10 PROCEDURE — 74011250637 HC RX REV CODE- 250/637: Performed by: NURSE PRACTITIONER

## 2020-09-10 RX ORDER — RISPERIDONE 1 MG/1
1 TABLET, FILM COATED ORAL 2 TIMES DAILY
Qty: 60 TAB | Refills: 0 | Status: SHIPPED | OUTPATIENT
Start: 2020-09-10 | End: 2020-10-02 | Stop reason: SDUPTHER

## 2020-09-10 RX ORDER — ATORVASTATIN CALCIUM 20 MG/1
20 TABLET, FILM COATED ORAL
Qty: 30 TAB | Refills: 0 | Status: SHIPPED | OUTPATIENT
Start: 2020-09-10 | End: 2020-10-02 | Stop reason: SDUPTHER

## 2020-09-10 RX ORDER — LISINOPRIL 20 MG/1
20 TABLET ORAL DAILY
Qty: 30 TAB | Refills: 0 | Status: SHIPPED | OUTPATIENT
Start: 2020-09-10 | End: 2020-12-18 | Stop reason: SDUPTHER

## 2020-09-10 RX ORDER — LORATADINE 10 MG/1
10 TABLET ORAL DAILY
Qty: 30 TAB | Refills: 0 | Status: SHIPPED | OUTPATIENT
Start: 2020-09-11 | End: 2020-10-02 | Stop reason: SDUPTHER

## 2020-09-10 RX ADMIN — LORATADINE 10 MG: 10 TABLET ORAL at 08:20

## 2020-09-10 RX ADMIN — RISPERIDONE 1 MG: 1 TABLET ORAL at 08:20

## 2020-09-10 RX ADMIN — LISINOPRIL 20 MG: 10 TABLET ORAL at 08:20

## 2020-09-10 NOTE — INTERDISCIPLINARY ROUNDS
Behavioral Health Interdisciplinary Rounds Patient Name: Jacquenette Litten  Age: 60 y.o. Room/Bed:  726/ Primary Diagnosis: <principal problem not specified> Admission status-Involuntary commitment Readmission within 30 days: no 
Power of  in place: no 
Patient requires a blocked bed: no          Reason for blocked bed: VTE Prophylaxis: No 
 
Mobility needs/Fall risk: no 
Flu Vaccine : no  
Nutritional Plan: no 
Consults:         
Labs/Testing due today?: no 
 
Sleep hours:8+ Participation in Care/Groups:   
Medication Compliant?: Yes PRNS (last 24 hours): Antianxiety Restraints (last 24 hours):  no 
  
CIWA (range last 24 hours): COWS (range last 24 hours): Alcohol screening (AUDIT) completed -   AUDIT Score: 0 If applicable, date SBIRT discussed in treatment team AND documented:  
AUDIT Screen Score: AUDIT Score: 0 Document Brief Intervention (corresponds directly with the 5 A's, Ask, Advise, Assess, Assist, and Arrange): At- Risk Patients (Score 7-15 for women; 8-15 for men) Discuss concern patient is drinking at unhealthy levels known to increase risk of alcohol-related health problems. Is Patient ready to commit to change? If No: 
? Encourage reflection ? Discuss short term and long term health risks of consuming alcohol ? Barriers to change ? Reaffirm willingness to help / Educational materials provided If Yes: 
? Set goal 
? Plan 
? Educational materials provided Harmful use or Dependence (Score 16 or greater) ? Discuss short term and long term health risks of consuming alcohol ? Recommendations ? Negotiate drinking goal 
? Recommend addiction specialist/center ? Arrange follow-up appointments. Tobacco - patient is a smoker: Have You Used Tobacco in the Past 30 Days: Never a smoker Illegal Drugs use: Have You Used Any Illegal Substances Over the Past 12 Months: No 
 
24 hour chart check complete: yes Patient goal(s) for today: prepare for discharge Treatment team focus/goals: reconcile medications and facilitate discharge Progress note: Pt is alert, oriented, clam, cooperative and psychiatrically stable for discharge. LOS:  6  Expected LOS:  
 
Financial concerns/prescription coverage:  UNINSURED Family contact: Jarrett Romero (close friend & roomate) ([phone]) Family requesting physician contact today:  no 
Discharge plan: return to friends home to live Access to weapons : na Outpatient provider(s): [facility] Patient's preferred phone number for follow up call : [phone] Participating treatment team members: Cindy Quintana, CHANDNI; MARYCHUY Miranda RN; Angelo Felipe, PharmD; Yeyo Sawant, MSW

## 2020-09-10 NOTE — BH NOTES
Behavioral Health Transition Record to Provider    Patient Name: Greta Subramanian  YOB: 1955  Medical Record Number: [id number]  Date of Admission: 9/4/2020  Date of Discharge: 9/10/20      Attending Provider: Itzel Meng MD  Discharging Provider: Erik Qureshi NP   To contact this individual call [phone] and ask the  to page. If unavailable, ask to be transferred to [facility] Provider on call. [facility] Provider will be available on call 24/7 and during holidays. Primary Care Provider: MADDI Walsh    No Known Allergies    Reason for Admission: CHIEF COMPLAINT:  \"I am fine, and do not know why I am here\"        HISTORY OF PRESENTING COMPLAINT:  Steve Elaine is a 60 y.o. OTHER female who is currently admitted to the psychiatric floor at [facility]. Patient endorses she came from [place] in 2016 and has 5 children that are still in [place]. She endorses she sometimes talks to herself when she feels \"the spirit and is praying for people, but she endorses she is not crazy. \" Patient is religiously preoccupied during assessment, and states, \"I am called to pray for people and I can hear and see things at that time. \" Patient denies SI/HI at this time. She states, \"I was taking Seroquel at home but I did not need that, I am not crazy so when I was discharged from hospital I never took that. \" Patient endorses the only pill she takes is for her allergies and that is not everyday. No aggression noted at this time.  Paranoid at times during assessment and looks behind her frequently.     Admission Diagnosis: Psychosis ([facility] [address] [F29]    * No surgery found *    Results for orders placed or performed during the hospital encounter of 09/04/20   GLUCOSE, FASTING   Result Value Ref Range    Glucose 83 65 - 100 MG/DL   LIPID PANEL   Result Value Ref Range    LIPID PROFILE          Cholesterol, total 211 (H) <200 MG/DL    Triglyceride 98 <150 MG/DL HDL Cholesterol 61 MG/DL    LDL, calculated 130.4 (H) 0 - 100 MG/DL    VLDL, calculated 19.6 MG/DL    CHOL/HDL Ratio 3.5 0.0 - 5.0     METABOLIC PANEL, COMPREHENSIVE   Result Value Ref Range    Sodium 143 136 - 145 mmol/L    Potassium 3.6 3.5 - 5.1 mmol/L    Chloride 106 97 - 108 mmol/L    CO2 31 21 - 32 mmol/L    Anion gap 6 5 - 15 mmol/L    Glucose 84 65 - 100 mg/dL    BUN 13 6 - 20 MG/DL    Creatinine 0.90 0.55 - 1.02 MG/DL    BUN/Creatinine ratio 14 12 - 20      GFR est AA >60 >60 ml/min/1.73m2    GFR est non-AA >60 >60 ml/min/1.73m2    Calcium 8.8 8.5 - 10.1 MG/DL    Bilirubin, total 0.4 0.2 - 1.0 MG/DL    ALT (SGPT) 20 12 - 78 U/L    AST (SGOT) 14 (L) 15 - 37 U/L    Alk. phosphatase 40 (L) 45 - 117 U/L    Protein, total 6.6 6.4 - 8.2 g/dL    Albumin 2.8 (L) 3.5 - 5.0 g/dL    Globulin 3.8 2.0 - 4.0 g/dL    A-G Ratio 0.7 (L) 1.1 - 2.2     VITAMIN B12   Result Value Ref Range    Vitamin B12 1,328 (H) 193 - 986 pg/mL   EKG, 12 LEAD, INITIAL   Result Value Ref Range    Ventricular Rate 75 BPM    Atrial Rate 75 BPM    P-R Interval 150 ms    QRS Duration 80 ms    Q-T Interval 410 ms    QTC Calculation (Bezet) 457 ms    Calculated P Axis 53 degrees    Calculated R Axis 27 degrees    Calculated T Axis 30 degrees    Diagnosis       Normal sinus rhythm Nonspecific T wave abnormality  No previous ECGs available  Confirmed by Car Jackson M.D., Lucillie Downing ([id number]) on 9/8/2020 6:57:00 PM         Immunizations administered during this encounter: There is no immunization history on file for this patient. Screening for Metabolic Disorders for Patients on Antipsychotic Medications  (Data obtained from the EMR)    Estimated Body Mass Index  Estimated body mass index is 26.38 kg/m² as calculated from the following:    Height as of this encounter: 5' 6\" (1.676 m). Weight as of this encounter: 74.1 kg (163 lb 7 oz).      Vital Signs/Blood Pressure  Visit Vitals  /87   Pulse 75   Temp 98.4 °F (36.9 °C)   Resp 16   Ht 5' 6\" (1.676 m)   Wt 74.1 kg (163 lb 7 oz)   LMP 01/07/2014 (Approximate)   SpO2 99%   BMI 26.38 kg/m²       Blood Glucose/Hemoglobin A1c  Lab Results   Component Value Date/Time    Glucose 84 09/10/2020 05:42 AM    Glucose 83 09/07/2020 06:17 AM    Glucose POC NF 87 05/22/2019 10:26 AM       Lab Results   Component Value Date/Time    Hemoglobin A1c 4.9 02/27/2018 06:07 AM        Lipid Panel  Lab Results   Component Value Date/Time    Cholesterol, total 211 (H) 09/07/2020 06:17 AM    HDL Cholesterol 61 09/07/2020 06:17 AM    LDL, calculated 130.4 (H) 09/07/2020 06:17 AM    Triglyceride 98 09/07/2020 06:17 AM    CHOL/HDL Ratio 3.5 09/07/2020 06:17 AM        Discharge Diagnosis: Unspecified psychotic disorder. Discharge Plan: The patient Latosha Hendrickson exhibits the ability to control behavior in a less restrictive environment. Patient's level of functioning is improving. No assaultive/destructive behavior has been observed for the past 24 hours. No suicidal/homicidal threat or behavior has been observed for the past 24 hours. There is no evidence of serious medication side effects. Patient has not been in physical or protective restraints for at least the past 24 hours. If weapons involved, how are they secured? No     Is patient aware of and in agreement with discharge plan? Yes     Arrangements for medication:  Prescriptions filled [facility] [place]     Copy of discharge instructions to provider?:  Ahmad Osler and [facility]     Arrangements for transportation home:  Friend to come . Keep all follow up appointments as scheduled, continue to take prescribed medications per physician instructions. Mental health crisis number:  026 or your local mental health crisis line number at [phone]. Discharge Medication List and Instructions:   Current Discharge Medication List      START taking these medications    Details   risperiDONE (RisperDAL) 1 mg tablet Take 1 Tab by mouth two (2) times a day.  Indications: psychosis  Qty: 60 Tab, Refills: 0      atorvastatin (LIPITOR) 20 mg tablet Take 1 Tab by mouth nightly. Indications: high amount of triglyceride in the blood  Qty: 30 Tab, Refills: 0      loratadine (CLARITIN) 10 mg tablet Take 1 Tab by mouth daily. Indications: inflammation of the nose due to an allergy  Qty: 30 Tab, Refills: 0         CONTINUE these medications which have CHANGED    Details   lisinopriL (PRINIVIL, ZESTRIL) 20 mg tablet Take 1 Tab by mouth daily. Indications: high blood pressure  Qty: 30 Tab, Refills: 0         STOP taking these medications       montelukast (Singulair) 10 mg tablet Comments:   Reason for Stopping:         cetirizine (ZYRTEC) 10 mg tablet Comments:   Reason for Stopping:               Unresulted Labs (24h ago, onward)    None        To obtain results of studies pending at discharge, please contact [phone]    Follow-up Information     Follow up With Specialties Details Why [address] [facility]  Call To set up wrap around follow-up outpatient such as case management, therapy and psychiatry. [address] [facility]  [phone]    [facility]  Call on 9/10/2020 For services. [address] [facility] [address]    [facility] [address], Zack Hope, [place] Physician Assistant   [address]  [address] [place] [address]  [phone]            Advanced Directive:   Does the patient have an appointed surrogate decision maker? No  Does the patient have a Medical Advance Directive? No  Does the patient have a Psychiatric Advance Directive? No  If the patient does not have a surrogate or Medical Advance Directive AND Psychiatric Advance Directive, the patient was offered information on these advance directives Patient declined to complete    Patient Instructions: Please continue all medications until otherwise directed by physician. Tobacco Cessation Discharge Plan:   Is the patient a smoker and needs referral for smoking cessation? No  Patient referred to the following for smoking cessation with an appointment? Not applicable     Patient was offered medication to assist with smoking cessation at discharge? Not applicable  Was education for smoking cessation added to the discharge instructions? Yes    Alcohol/Substance Abuse Discharge Plan:   Does the patient have a history of substance/alcohol abuse and requires a referral for treatment? No  Patient referred to the following for substance/alcohol abuse treatment with an appointment? Not applicable  Patient was offered medication to assist with alcohol cessation at discharge? Not applicable  Was education for substance/alcohol abuse added to discharge instructions? Not applicable    Patient discharged to Home; discussed with patient/caregiver and provided to the patient/caregiver either in hard copy or electronically.

## 2020-09-10 NOTE — DISCHARGE SUMMARY
PSYCHIATRIC DISCHARGE SUMMARY      Patient: Geeta Alvarado MRN: [id number]  SSN: [id number]    YOB: 1955  Age: 60 y.o. Sex: female        Date of Admission: 9/4/2020  Date of Discharge:9/10/2020       Type of Discharge:  REGULAR     Admission data:  CHIEF COMPLAINT:  \"I am fine, and do not know why I am here\"        HISTORY OF PRESENTING COMPLAINT:  Geeta Alvarado is a 60 y.o. OTHER female who is currently admitted to the psychiatric floor at [facility]. Patient endorses she came from [place] in 2016 and has 5 children that are still in [place]. She endorses she sometimes talks to herself when she feels \"the spirit and is praying for people, but she endorses she is not crazy. \" Patient is religiously preoccupied during assessment, and states, \"I am called to pray for people and I can hear and see things at that time. \" Patient denies SI/HI at this time. She states, \"I was taking Seroquel at home but I did not need that, I am not crazy so when I was discharged from hospital I never took that. \" Patient endorses the only pill she takes is for her allergies and that is not everyday. No aggression noted at this time. Paranoid at times during assessment and looks behind her frequently.            PAST PSYCHIATRIC HISTORY and SUBSTANCE ABUSE HISTORY:  Patient endorses a previous psychiatric hospitalization in 2018. Patient denies any substance abuse history.        PAST MEDICAL HISTORY:  Please see H&P for details. Past Medical History:   Diagnosis Date    Hypertension      Ill-defined condition       GERD               Prior to Admission medications    Medication Sig Start Date End Date Taking? Authorizing Provider   lisinopriL (PRINIVIL, ZESTRIL) 20 mg tablet Take 20 mg by mouth daily.     Yes Provider, Historical   montelukast (Singulair) 10 mg tablet Take 10 mg by mouth daily.     Yes Provider, Historical   cetirizine (ZYRTEC) 10 mg tablet Take 1 Tab by mouth nightly.  For allergies, instead of loratidine. 8/28/19   Yes Naz Mcelroy MD                Lab Results   Component Value Date/Time     WBC 5.6 09/04/2020 08:20 AM     HGB 12.4 09/04/2020 08:20 AM     HCT 36.7 09/04/2020 08:20 AM     PLATELET 812 04/67/3023 08:20 AM     MCV 96.3 09/04/2020 08:20 AM            Lab Results   Component Value Date/Time     Sodium 143 09/04/2020 08:20 AM     Potassium 3.2 (L) 09/04/2020 03:33 PM     Chloride 110 (H) 09/04/2020 08:20 AM     CO2 26 09/04/2020 08:20 AM     Anion gap 7 09/04/2020 08:20 AM     Glucose 99 09/04/2020 08:20 AM     Glucose 89 02/27/2018 06:07 AM     BUN 11 09/04/2020 08:20 AM     Creatinine 1.05 (H) 09/04/2020 08:20 AM     BUN/Creatinine ratio 10 (L) 09/04/2020 08:20 AM     GFR est AA >60 09/04/2020 08:20 AM     GFR est non-AA 53 (L) 09/04/2020 08:20 AM     Calcium 8.5 09/04/2020 08:20 AM     Bilirubin, total 0.9 09/04/2020 08:20 AM     Alk.  phosphatase 44 (L) 09/04/2020 08:20 AM     Protein, total 7.2 09/04/2020 08:20 AM     Albumin 3.2 (L) 09/04/2020 08:20 AM     Globulin 4.0 09/04/2020 08:20 AM     A-G Ratio 0.8 (L) 09/04/2020 08:20 AM     ALT (SGPT) 21 09/04/2020 08:20 AM            Vitals:     09/04/20 2310 09/04/20 2324 09/05/20 0702   BP: 127/84   119/81   Pulse: 85   83   Resp: 16   16   Temp: 98.6 °F (37 °C)   98.2 °F (36.8 °C)   SpO2: 98%   95%   Weight:   68.9 kg (152 lb)     Height:   5' 6\" (1.676 m)     LMP: 01/07/2014          PSYCHOSOCIAL HISTORY:  [place] [facility]  Has 5 children        MENTAL STATUS EXAM:  General appearance: psychomotor activity is calm  Eye contact: Good eye contact  Speech: Spontaneous  Affect : Mildly Constricted  Mood: Pleasant  Thought Process: Logical, goal directed  Perception: Denies HI, +AVH   Thought Content: Denies SI or Plan  Insight: Poor  Judgement: Poor  Cognition: Impaired due to mental health        ASSESSMENT AND PLAN:  Ondina Marquez meets criteria for a diagnosis of  Schizoaffective Disorder. Hospital Course:    Patient was admitted to the Psychiatric services for acute psychiatric stabilization in regards to symptomatology as described in the HPI above and placed on Q15 minute checks and suicide precautions. She was started back on her usual medication regimen as well as PRN medications including lisinopril. She was started on risperdal. While on the unit Janki Kolb was involved in individual, group, occupational and milieu therapy. She improved gradually and was able to integrate into the milieu with help from the nursing staff. Patients symptoms improved gradually including less religiously preoccupied, less psychotic, no si or hi. She was appropriate in her interactions, and cooperative with medications and the unit routine. Please see individual progress notes for more specific details regarding patient's hospitalization course. Patient was discharged as per the plan. She had been doing well on the unit as per the report of the nursing staff and my observations. No PRN medication for agitation, seclusion or restraints were required during the last 48 hours of her stay. Janki Kolb had improved progressively to the point of being stable for discharge and outpatient FU. At this time she did not offer any complaints. Patient denied any SI or HI. Denied any AH or VH. She denied any delusions. Was not considered a danger to self or to others and is safe for discharge. Will FU with her appointments and remains motivated to be in treatment. The patient verbalized understanding of her discharge instructions. Some parts of the discharge summary are from the initial Psychiatric interview that was done on admission by the admitting psychiatrist.       Allergies:(reviewed/updated 9/10/2020)  No Known Allergies    Side Effects: (reviewed/updated 9/10/2020)  None reported or admitted to.     Vital Signs:  Patient Vitals for the past 24 hrs:   Temp Pulse Resp BP SpO2 09/09/20 2012 98 °F (36.7 °C) 80 16 (!) 182/95    09/09/20 1551 97.6 °F (36.4 °C) 81 14 168/82 99 %     Wt Readings from Last 3 Encounters:   09/06/20 74.1 kg (163 lb 7 oz)   09/04/20 68.5 kg (151 lb)   08/28/19 73.5 kg (162 lb)     Temp Readings from Last 3 Encounters:   09/04/20 98.5 °F (36.9 °C)   08/28/19 97.3 °F (36.3 °C) (Oral)   05/22/19 98.4 °F (36.9 °C) (Oral)     BP Readings from Last 3 Encounters:   09/09/20 (!) 182/95   09/04/20 118/77   08/28/19 149/90     Pulse Readings from Last 3 Encounters:   09/09/20 80   09/04/20 85   08/28/19 (!) 58       Labs: (reviewed/updated 9/10/2020)  Recent Results (from the past 24 hour(s))   METABOLIC PANEL, COMPREHENSIVE    Collection Time: 09/10/20  5:42 AM   Result Value Ref Range    Sodium 143 136 - 145 mmol/L    Potassium 3.6 3.5 - 5.1 mmol/L    Chloride 106 97 - 108 mmol/L    CO2 31 21 - 32 mmol/L    Anion gap 6 5 - 15 mmol/L    Glucose 84 65 - 100 mg/dL    BUN 13 6 - 20 MG/DL    Creatinine 0.90 0.55 - 1.02 MG/DL    BUN/Creatinine ratio 14 12 - 20      GFR est AA >60 >60 ml/min/1.73m2    GFR est non-AA >60 >60 ml/min/1.73m2    Calcium 8.8 8.5 - 10.1 MG/DL    Bilirubin, total 0.4 0.2 - 1.0 MG/DL    ALT (SGPT) 20 12 - 78 U/L    AST (SGOT) 14 (L) 15 - 37 U/L    Alk. phosphatase 40 (L) 45 - 117 U/L    Protein, total 6.6 6.4 - 8.2 g/dL    Albumin 2.8 (L) 3.5 - 5.0 g/dL    Globulin 3.8 2.0 - 4.0 g/dL    A-G Ratio 0.7 (L) 1.1 - 2.2     VITAMIN B12    Collection Time: 09/10/20  5:42 AM   Result Value Ref Range    Vitamin B12 1,328 (H) 193 - 986 pg/mL     No results found for: VALF2, VALAC, VALP, VALPR, DS6, CRBAM, CRBAMP, CARB2, XCRBAM  No results found for: [facility]    Radiology (reviewed/updated 9/10/2020)  Ct Head Wo Cont    Result Date: 9/3/2020  EXAM: CT HEAD WO CONT INDICATION: delirium COMPARISON: 2018. CONTRAST: None. TECHNIQUE: Unenhanced CT of the head was performed using 5 mm images. Brain and bone windows were generated. Coronal and sagittal reformats.  CT dose reduction was achieved through use of a standardized protocol tailored for this examination and automatic exposure control for dose modulation. FINDINGS: The ventricles and sulci are normal in size, shape and configuration. . There is no significant white matter disease. There is no intracranial hemorrhage, extra-axial collection, or mass effect. The basilar cisterns are open. No CT evidence of acute infarct. The bone windows demonstrate no abnormalities. The visualized portions of the paranasal sinuses and mastoid air cells are clear. IMPRESSION: No acute abnormality identified       Mental Status Exam on Discharge:  General appearance:   Jose Maria Hayward is a 60 y.o. OTHER female who is well groomed, psychomotor activity is WNL  Eye contact: makes good eye contact  Speech: Spontaneous and coherent  Affect : Euthymic  Mood: \"OK\"  Thought Process: Logical, goal directed  Perception: Denies any AH or VH. Thought Content: Denies any SI or Plan  Insight: Partial  Judgement: Fair  Cognition: Intact grossly. Discharge Diagnosis:   Unspecified psychotic disorder. Current Discharge Medication List      START taking these medications    Details   risperiDONE (RisperDAL) 1 mg tablet Take 1 Tab by mouth two (2) times a day. Indications: psychosis  Qty: 60 Tab, Refills: 0      atorvastatin (LIPITOR) 20 mg tablet Take 1 Tab by mouth nightly. Indications: high amount of triglyceride in the blood  Qty: 30 Tab, Refills: 0      loratadine (CLARITIN) 10 mg tablet Take 1 Tab by mouth daily. Indications: inflammation of the nose due to an allergy  Qty: 30 Tab, Refills: 0         CONTINUE these medications which have CHANGED    Details   lisinopriL (PRINIVIL, ZESTRIL) 20 mg tablet Take 1 Tab by mouth daily.  Indications: high blood pressure  Qty: 30 Tab, Refills: 0         STOP taking these medications       montelukast (Singulair) 10 mg tablet Comments:   Reason for Stopping:         cetirizine (ZYRTEC) 10 mg tablet Comments:   Reason for Stopping: Follow-up Information     Follow up With Specialties Details Why [address]  Call To set up wrap around follow-up outpatient such as case management, therapy and psychiatry. [address] [facility]  [phone]    [facility]  Call on 9/10/2020 For services. [address] [place] [address] Se, Debbi Shah [place] Physician Assistant   Keeley [address]  [address] [address]  [phone]          WOUND CARE: none needed. Prognosis:   Good / Berniece Jack based on nature of patient's pathology/ies and treatment compliance issues. Prognosis is greatly dependent upon patient's ability to  follow up on psychiatric/psychotherapy appointments as well as to comply with psychiatric medications as prescribed. I certify that this patients inpatient psychiatric hospital services furnished since the previous certification were, and continue to be, required for treatment that could reasonably be expected to improve the patient's condition, or for diagnostic study, and that the patient continues to need, on a daily basis, active treatment furnished directly by or requiring the supervision of inpatient psychiatric facility personnel. In addition, the hospital records show that services furnished were intensive treatment services, admission or related services, or equivalent services.      Signed:  Selene Hoskins NP  9/10/2020

## 2020-09-10 NOTE — BH NOTES
DISCHARGE SUMMARY from Nurse    PATIENT INSTRUCTIONS:    What to do at Home:  Recommended activity: Activity as tolerated,   *  Please give a list of your current medications to your Primary Care Provider. *  Please update this list whenever your medications are discontinued, doses are      changed, or new medications (including over-the-counter products) are added. *  Please carry medication information at all times in case of emergency situations. These are general instructions for a healthy lifestyle:    No smoking/ No tobacco products/ Avoid exposure to second hand smoke  Surgeon General's Warning:  Quitting smoking now greatly reduces serious risk to your health. Obesity, smoking, and sedentary lifestyle greatly increases your risk for illness    A healthy diet, regular physical exercise & weight monitoring are important for maintaining a healthy lifestyle    You may be retaining fluid if you have a history of heart failure or if you experience any of the following symptoms:  Weight gain of 3 pounds or more overnight or 5 pounds in a week, increased swelling in our hands or feet or shortness of breath while lying flat in bed. Please call your doctor as soon as you notice any of these symptoms; do not wait until your next office visit. The discharge information has been reviewed with the patient. The patient verbalized understanding. Discharge medications reviewed with the patient and appropriate educational materials and side effects teaching were provided. Awaiting ride arrival from her friend. Also instructed patient to call patient advocacy relating to her lost dress  In MICU.  ___________________________________________________________________________________________________________________________________

## 2020-09-10 NOTE — PROGRESS NOTES
Problem: Altered Thought Process (Adult/Pediatric)  Goal: *STG: Participates in treatment plan  Outcome: Progressing Towards Goal  Goal: *STG: Seeks staff when feelings of anxiety and fear arise  Outcome: Progressing Towards Goal  Goal: *STG: Decreased delusional thinking  Outcome: Progressing Towards Goal    Patient participates in treatment plan. Patient discusses Judaism beliefs and culture with writer. Patient is firm in her beliefs. Patient will be discharged to home. Personal belongings returned. Discharge instructions gone over and opportunity for questions to be answered and clarified. @ [phone] Patient belongings were looked for in anticipation of discharge. Patient came into ER with bright green dress, shorter in front, longer in back. .buttons down front. Wrap with flowers on it  Pants vanilla or tan with small flowers on them. These clothing items, in a bag were given to a nurse on [address] [place] [address]. Patient transferred up to [address] [place] [address] on 09/04/20, she did not bring these pieces of clothing with her to [address] [place] [address].   @ 6246 on 09/10 called to [address] [place] [address] to locate belongings. @ 91 [phone] 06 on 09/10 called to [address] [place] [address] with more information to locate belongings. Unable to locate belongings on IMCU2. Patient offered new set of clothes from closet, refuses. Wears hospital gown for discharge. Patient refuses to wear her shoes to discharge. Offered telephone number for patient advocate to the patient to address the missing clothing issue. Patient declines the number.

## 2020-09-10 NOTE — PROGRESS NOTES
Problem: Discharge Planning  Goal: *Discharge to safe environment  Outcome: Progressing Towards Goal  Note: Pt has a safe home to return to and supportive friends. Goal: *Knowledge of medication management  Outcome: Progressing Towards Goal  Note: Pt agrees to take medication as prescribed. Goal: *Knowledge of discharge instructions  Outcome: Progressing Towards Goal  Note: Pt verbalized understanding of goals for tx and discharge.

## 2020-09-10 NOTE — DISCHARGE INSTRUCTIONS
DISCHARGE SUMMARY    NAME:Leda Elaine  : 1955  MRN: [id number]    The patient Norma Callejas exhibits the ability to control behavior in a less restrictive environment. Patient's level of functioning is improving. No assaultive/destructive behavior has been observed for the past 24 hours. No suicidal/homicidal threat or behavior has been observed for the past 24 hours. There is no evidence of serious medication side effects. Patient has not been in physical or protective restraints for at least the past 24 hours. If weapons involved, how are they secured? No     Is patient aware of and in agreement with discharge plan? Yes     Arrangements for medication:  Prescriptions filled [facility]     Copy of discharge instructions to provider?:  [facility] [employer] [facility]     Arrangements for transportation home:  Friend to come . Keep all follow up appointments as scheduled, continue to take prescribed medications per physician instructions. Mental health crisis number:  013 or your local mental health crisis line number at [phone]. DISCHARGE SUMMARY from Nurse    PATIENT INSTRUCTIONS:    What to do at Home:  Recommended activity: Activity as tolerated,     If you experience any of the following symptoms feelings of helplessness or hopelessness, thoughts of hurting yourself or someone else, please follow up with 911 or your local mental health crisis line number at [phone]. *  Please give a list of your current medications to your Primary Care Provider. *  Please update this list whenever your medications are discontinued, doses are      changed, or new medications (including over-the-counter products) are added. *  Please carry medication information at all times in case of emergency situations.     These are general instructions for a healthy lifestyle:    No smoking/ No tobacco products/ Avoid exposure to second hand smoke  Surgeon General's Warning:  Quitting smoking now greatly reduces serious risk to your health. Obesity, smoking, and sedentary lifestyle greatly increases your risk for illness    A healthy diet, regular physical exercise & weight monitoring are important for maintaining a healthy lifestyle    You may be retaining fluid if you have a history of heart failure or if you experience any of the following symptoms:  Weight gain of 3 pounds or more overnight or 5 pounds in a week, increased swelling in our hands or feet or shortness of breath while lying flat in bed. Please call your doctor as soon as you notice any of these symptoms; do not wait until your next office visit. The discharge information has been reviewed with the patient. The patient verbalized understanding. Discharge medications reviewed with the patient and appropriate educational materials and side effects teaching were provided.   ___________________________________________________________________________________________________________________________________

## 2020-09-10 NOTE — PROGRESS NOTES
Problem: Falls - Risk of  Goal: *Absence of Falls  Description: Document Sussy Pedraza Fall Risk and appropriate interventions in the flowsheet. Outcome: Progressing Towards Goal  Note: Fall Risk Interventions:  Mobility Interventions: Assess mobility with egress test    Mentation Interventions: Adequate sleep, hydration, pain control    Medication Interventions: Teach patient to arise slowly    Elimination Interventions: Toilet paper/wipes in reach       Pt. Received resting in bed, NAD, respirations even and unlabored. Will continue q15 safety.

## 2020-09-10 NOTE — PROGRESS NOTES
Pharmacist Discharge Medication Reconciliation    Discharging Provider: Araceli Gutiérrez NP    Significant PMH:   Past Medical History:   Diagnosis Date    Hypertension     Ill-defined condition     GERD     Chief Complaint for this Admission: No chief complaint on file. Allergies: Patient has no known allergies. Discharge Medications:   Current Discharge Medication List        START taking these medications    Details   risperiDONE (RisperDAL) 1 mg tablet Take 1 Tab by mouth two (2) times a day. Indications: psychosis  Qty: 60 Tab, Refills: 0      atorvastatin (LIPITOR) 20 mg tablet Take 1 Tab by mouth nightly. Indications: high amount of triglyceride in the blood  Qty: 30 Tab, Refills: 0      loratadine (CLARITIN) 10 mg tablet Take 1 Tab by mouth daily. Indications: inflammation of the nose due to an allergy  Qty: 30 Tab, Refills: 0           CONTINUE these medications which have CHANGED    Details   lisinopriL (PRINIVIL, ZESTRIL) 20 mg tablet Take 1 Tab by mouth daily.  Indications: high blood pressure  Qty: 30 Tab, Refills: 0           STOP taking these medications       montelukast (Singulair) 10 mg tablet Comments:   Reason for Stopping:         cetirizine (ZYRTEC) 10 mg tablet Comments:   Reason for Stopping:             The patient's chart, MAR and AVS were reviewed by Aravind Ferris PHARMD.

## 2020-09-14 NOTE — PROGRESS NOTES
Physician Progress Note      Arie Ferguson  CSN #:                  [id number]  :                       1955  ADMIT DATE:       9/3/2020 12:28 AM  Mark Alvarez [place] DATE:        2020 10:04 PM  RESPONDING  PROVIDER #:        Nick Curry MD          QUERY TEXT:    Dear Dr. Sean Limon,    Patient admitted with acute delrium. Noted documentation of Acute Kidney Injury in H&P, D/C summary and Hospitalist progress notes. Please document in progress notes and discharge summary:    The medical record reflects the following:  Risk Factors: HTN  Clinical Indicators: sCr = 1.46 on admit (9/3), no baseline sCr noted, sCr = 1.05 on , 0.90 on 9/10, 1.02 on 19; GFR = 36 on admit (9/3), GFR = 53 on 9/10, >60 on 9/10, 55 on 19  Treatment: IVF (LR @ 150 ml/hr), serial lab monitoring    Thank Kurt Alfonso RN  [facility]  [phone]      Defined by RIFLE (BSV Approved):  -Risk:  Increased sCr x 1.5 or GFR decrease > 25% (within 7 days)  -Injury:  Increased sCr x 2.0 or GFR decreased > 50%  -Failure: Increased sCr x 3.0 or GFR decrease > 75% or SCr >4.0 mg/dL or acute increase >0.5 mg/dL  -Loss:  Persistent acute renal failure = complete loss of kidney function > 4 weeks  -End Stage:  End stage of kidney disease > 3 months    Defined by Kidney Disease Improving Global Outcomes (KDIGO) clinical practice guideline for acute kidney injury:  -Increase in SCr by ? 0.3 mg/dl within 48 hours; or  -Increase in SCr to ? 1.5 times baseline, which is known or presumed to have occurred within the prior 7 days; or  -Urine volume < 0.5ml/kg/h for 6 hours  Options provided:  -- Acute kidney injury evidenced by, please see RIFLE criteria below or specify if you are using a different criteria for Acute kidney injury, Please document evidence.   -- Dehydration with elevated serum creatinine only, acute kidney injury ruled out after study  -- Other - I will add my own diagnosis  -- Disagree - Not applicable / Not valid  -- Disagree - Clinically unable to determine / Unknown  -- Refer to Clinical Documentation Reviewer    PROVIDER RESPONSE TEXT:    Dehydration with elevated serum creatinine only, acute kidney injury was ruled out after study.     Query created by: Antoine Peterson on 9/10/2020 2:06 PM      Electronically signed by:  Hector Allen MD 9/14/2020 8:37 AM

## 2020-09-14 NOTE — PROGRESS NOTES
Reached out to patient at [phone] for follow up. Voicemail came on and no message left due to confidentiality.

## 2020-10-02 ENCOUNTER — VIRTUAL VISIT (OUTPATIENT)
Dept: FAMILY MEDICINE CLINIC | Age: 65
End: 2020-10-02

## 2020-10-02 DIAGNOSIS — E78.5 HYPERLIPIDEMIA, UNSPECIFIED HYPERLIPIDEMIA TYPE: ICD-10-CM

## 2020-10-02 DIAGNOSIS — F29 PSYCHOSIS, UNSPECIFIED PSYCHOSIS TYPE (HCC): ICD-10-CM

## 2020-10-02 DIAGNOSIS — I10 ESSENTIAL HYPERTENSION: Primary | ICD-10-CM

## 2020-10-02 DIAGNOSIS — J30.89 ALLERGIC RHINITIS DUE TO OTHER ALLERGIC TRIGGER, UNSPECIFIED SEASONALITY: ICD-10-CM

## 2020-10-02 PROCEDURE — 1111F DSCHRG MED/CURRENT MED MERGE: CPT | Performed by: NURSE PRACTITIONER

## 2020-10-02 PROCEDURE — 99441 PR PHYS/QHP TELEPHONE EVALUATION 5-10 MIN: CPT | Performed by: NURSE PRACTITIONER

## 2020-10-02 RX ORDER — ATORVASTATIN CALCIUM 20 MG/1
20 TABLET, FILM COATED ORAL
Qty: 30 TAB | Refills: 1 | Status: SHIPPED | OUTPATIENT
Start: 2020-10-02 | End: 2020-10-02 | Stop reason: SDUPTHER

## 2020-10-02 RX ORDER — LORATADINE 10 MG/1
10 TABLET ORAL DAILY
Qty: 30 TAB | Refills: 1 | Status: SHIPPED | OUTPATIENT
Start: 2020-10-02 | End: 2020-10-02 | Stop reason: SDUPTHER

## 2020-10-02 RX ORDER — LORATADINE 10 MG/1
10 TABLET ORAL DAILY
Qty: 30 TAB | Refills: 1 | Status: SHIPPED | OUTPATIENT
Start: 2020-10-02 | End: 2020-12-18 | Stop reason: SDUPTHER

## 2020-10-02 RX ORDER — ATORVASTATIN CALCIUM 20 MG/1
20 TABLET, FILM COATED ORAL
Qty: 30 TAB | Refills: 1 | Status: SHIPPED | OUTPATIENT
Start: 2020-10-02 | End: 2020-12-18 | Stop reason: SDUPTHER

## 2020-10-02 RX ORDER — RISPERIDONE 1 MG/1
1 TABLET, FILM COATED ORAL 2 TIMES DAILY
Qty: 60 TAB | Refills: 1 | Status: SHIPPED | OUTPATIENT
Start: 2020-10-02 | End: 2020-12-18 | Stop reason: SDUPTHER

## 2020-10-02 RX ORDER — RISPERIDONE 1 MG/1
1 TABLET, FILM COATED ORAL 2 TIMES DAILY
Qty: 60 TAB | Refills: 1 | Status: SHIPPED | OUTPATIENT
Start: 2020-10-02 | End: 2020-10-02 | Stop reason: SDUPTHER

## 2020-10-02 NOTE — Clinical Note
-her hospital discharge note recommends follow up with behavioral health. Patient states \"I'm fine now. \"    -I suggested a telephone follow up call with one of us in 6 weeks. Do we want to do anything differently or in addition to that?   Thanks, Molly Best

## 2020-10-02 NOTE — PROGRESS NOTES
Total Time: minutes: 5-10 minutes  Diagnoses and all orders for this visit:    1. Essential hypertension  -     [place] DISCHARGE MEDS RECONCILED W/ CURRENT OUTPATIENT MED LIST    2. Psychosis, unspecified psychosis type ([address]  -     risperiDONE (RisperDAL) 1 mg tablet; Take 1 Tab by mouth two (2) times a day. Indications: psychosis    3. Hyperlipidemia, unspecified hyperlipidemia type  -     atorvastatin (LIPITOR) 20 mg tablet; Take 1 Tab by mouth nightly. Indications: high amount of triglyceride in the blood    4. Allergic rhinitis due to other allergic trigger, unspecified seasonality  -     loratadine (CLARITIN) 10 mg tablet; Take 1 Tab by mouth daily. Indications: inflammation of the nose due to an allergy      Follow-up and Dispositions         I communicated with the patient and/or health care decision maker about details of this discussion including any medical advice provided: patient stated she has refills on lisinopril, needs refills on \"the other three\" medicines. Medications read out loud and verified with patient. Follow up hospital discharge for psychosis was recommended. She declines the referral and states \"I am fine now\". Will offer 6 week follow up telephone call visit. Danilo Zheng is a 60 y.o. female evaluated via telephone at [phone] on 10/2/2020. Patient identification verified with 2 identifiers. Consent: She and/or health care decision maker has provided verbal consent to proceed: Yes   Pursuant to the emergency declaration under the [facility], [facility] authority and the [employer] General Act, this Virtual Telephone Visit was conducted to reduce the patient's risk of exposure to COVID-19.   : speaks English  Chief Complaint   Patient presents with   [facility] Other     Pt is concern abouth  with the bills     History of Present Illness  Doing better, sleeping better. She had lisinopril refilled before she went into the hospital.  No physical exam performed due to telephone visit. I affirm this is a Patient Initiated Episode with a Patient who has not had a related appointment within my department in the past 7 days or scheduled within the next 24 hours. F/U 6 weeks. CHANDNI Adan Hudson expressed understanding and agreed to this plan.

## 2020-10-02 NOTE — PROGRESS NOTES
Coordination of Care  1. Have you been to the ER, urgent care clinic since your last visit? Hospitalized since your last visit? Yes When: 9/4/2020[facility]- Sleep issues    2. Have you seen or consulted any other health care providers outside of the [address] [place] [address] since your last visit? Include any pap smears or colon screening. No    Does the patient need refills? NO    Learning Assessment Complete?  yes  Depression Screening complete in the past 12 months? yes

## 2020-10-02 NOTE — PROGRESS NOTES
Pt's pharmacy changed to [facility] to save money versus [facility]; pt informed. No Goodrx coupons needed with [facility] discounted pricing. Fahad Vale.  [facility]

## 2020-10-05 ENCOUNTER — VIRTUAL VISIT (OUTPATIENT)
Dept: FAMILY MEDICINE CLINIC | Age: 65
End: 2020-10-05

## 2020-10-05 DIAGNOSIS — Z71.89 COUNSELING AND COORDINATION OF CARE: Primary | ICD-10-CM

## 2020-10-05 NOTE — PROGRESS NOTES
Per HG, reached out to patient to assist with bills. Patient will call OW back when she has POI necessary. Patient will get a F2F appointment to complete CC application.

## 2020-10-12 ENCOUNTER — VIRTUAL VISIT (OUTPATIENT)
Dept: FAMILY MEDICINE CLINIC | Age: 65
End: 2020-10-12

## 2020-10-12 DIAGNOSIS — Z71.89 COUNSELING AND COORDINATION OF CARE: Primary | ICD-10-CM

## 2020-10-12 NOTE — PROGRESS NOTES
Patient has been pre-screened for CC application. Patient has been scheduled for F2F for document signing and POI.

## 2020-10-15 ENCOUNTER — OFFICE VISIT (OUTPATIENT)
Dept: FAMILY MEDICINE CLINIC | Age: 65
End: 2020-10-15

## 2020-10-15 DIAGNOSIS — Z71.89 COUNSELING AND COORDINATION OF CARE: Primary | ICD-10-CM

## 2020-10-15 PROCEDURE — 99080 SPECIAL REPORTS OR FORMS: CPT | Performed by: FAMILY MEDICINE

## 2020-11-17 ENCOUNTER — TELEPHONE (OUTPATIENT)
Dept: FAMILY MEDICINE CLINIC | Age: 65
End: 2020-11-17

## 2020-11-17 NOTE — TELEPHONE ENCOUNTER
I called patient to offer and schedule an appointment from [phone] list. No answer, but I was able to leave a voicemail message.

## 2020-11-20 ENCOUNTER — TELEPHONE (OUTPATIENT)
Dept: FAMILY MEDICINE CLINIC | Age: 65
End: 2020-11-20

## 2020-11-20 NOTE — TELEPHONE ENCOUNTER
I called patient once again to offer and schedule follow up appointment with MM.  No answer, voicemail message left

## 2020-12-15 ENCOUNTER — TELEPHONE (OUTPATIENT)
Dept: FAMILY MEDICINE CLINIC | Age: 65
End: 2020-12-15

## 2020-12-15 NOTE — TELEPHONE ENCOUNTER
A third attempt was made to reach patient and schedule a follow up appointment. No answer, voicemail message left.

## 2020-12-17 NOTE — PROGRESS NOTES
: speaks English  Patient identification verified with 2 identifiers. Consent: She and/or health care decision maker has provided verbal consent to proceed: Yes   Total Time: minutes: 5-10 minutes  Pursuant to the emergency declaration under the [address], [address] [place] [address] authority and the [employer] Act, this Virtual Telephone Visit was conducted to reduce the patient's risk of exposure to COVID-19. Assessment:   Diagnoses and all orders for this visit:    1. Essential hypertension  -     lisinopriL (PRINIVIL, ZESTRIL) 20 mg tablet; Take 1 Tab by mouth daily. Indications: high blood pressure    2. Hyperlipidemia, unspecified hyperlipidemia type  -     atorvastatin (LIPITOR) 20 mg tablet; Take 1 Tab by mouth nightly. Indications: high amount of triglyceride in the blood    3. Allergic rhinitis due to other allergic trigger, unspecified seasonality  -     loratadine (CLARITIN) 10 mg tablet; Take 1 Tab by mouth daily. Indications: inflammation of the nose due to an allergy    4. Psychosis, unspecified psychosis type ([facility]  -     risperiDONE (RisperDAL) 1 mg tablet; Take 1 Tab by mouth two (2) times a day. Indications: psychosis      Plan:  I communicated with the patient and/or health care decision maker about the following: Follow-up and Dispositions    · Return for [facility] 3 months (med refill, evaluation). Candido Gutierrez is a 60 y.o. female evaluated via telephone on 12/18/2020. Patient identification verified with 2 identifiers. Chief Complaint   Patient presents with    Follow-up     hypertension, psychosis, med refill      History of Present Illness Able to tell me that she is taking her medications as prescribed. Denies any symptoms of psychosis. No chest pain or shortness of breath. No physical exam performed due to telephone visit.   I affirm this is a Patient Initiated Episode with a Patient who has not had a related appointment within my department in the past 7 days or scheduled within the next 24 hours. Freida Noguera, CHANDNI Bradley expressed understanding and agreed to this plan.

## 2020-12-18 ENCOUNTER — VIRTUAL VISIT (OUTPATIENT)
Dept: FAMILY MEDICINE CLINIC | Age: 65
End: 2020-12-18

## 2020-12-18 DIAGNOSIS — I10 ESSENTIAL HYPERTENSION: Primary | ICD-10-CM

## 2020-12-18 DIAGNOSIS — F29 PSYCHOSIS, UNSPECIFIED PSYCHOSIS TYPE (HCC): ICD-10-CM

## 2020-12-18 DIAGNOSIS — E78.5 HYPERLIPIDEMIA, UNSPECIFIED HYPERLIPIDEMIA TYPE: ICD-10-CM

## 2020-12-18 DIAGNOSIS — J30.89 ALLERGIC RHINITIS DUE TO OTHER ALLERGIC TRIGGER, UNSPECIFIED SEASONALITY: ICD-10-CM

## 2020-12-18 PROCEDURE — 99441 PR PHYS/QHP TELEPHONE EVALUATION 5-10 MIN: CPT | Performed by: NURSE PRACTITIONER

## 2020-12-18 RX ORDER — ATORVASTATIN CALCIUM 20 MG/1
20 TABLET, FILM COATED ORAL
Qty: 30 TAB | Refills: 2 | Status: SHIPPED | OUTPATIENT
Start: 2020-12-18

## 2020-12-18 RX ORDER — LISINOPRIL 20 MG/1
20 TABLET ORAL DAILY
Qty: 30 TAB | Refills: 2 | Status: SHIPPED | OUTPATIENT
Start: 2020-12-18

## 2020-12-18 RX ORDER — RISPERIDONE 1 MG/1
1 TABLET, FILM COATED ORAL 2 TIMES DAILY
Qty: 60 TAB | Refills: 2 | Status: SHIPPED | OUTPATIENT
Start: 2020-12-18

## 2020-12-18 RX ORDER — LORATADINE 10 MG/1
10 TABLET ORAL DAILY
Qty: 30 TAB | Refills: 2 | Status: SHIPPED | OUTPATIENT
Start: 2020-12-18

## 2020-12-18 NOTE — PROGRESS NOTES
Coordination of Care  1. Have you been to the ER, urgent care clinic since your last visit? Hospitalized since your last visit? No    2. Have you seen or consulted any other health care providers outside of the [address] [place] [address] since your last visit? Include any pap smears or colon screening. No    Does the patient need refills? YES    Learning Assessment Complete? yes  Depression Screening complete in the past 12 months? yes      Patient has no access to vital sign equipment at home.

## 2021-01-14 ENCOUNTER — TELEPHONE (OUTPATIENT)
Dept: FAMILY MEDICINE CLINIC | Age: 66
End: 2021-01-14

## 2021-04-05 ENCOUNTER — TELEPHONE (OUTPATIENT)
Dept: FAMILY MEDICINE CLINIC | Age: 66
End: 2021-04-05

## 2022-05-09 ENCOUNTER — EMERGENCY (EMERGENCY)
Facility: HOSPITAL | Age: 67
LOS: 0 days | Discharge: HOME | End: 2022-05-09
Payer: MEDICAID

## 2022-05-09 VITALS
HEART RATE: 62 BPM | RESPIRATION RATE: 15 BRPM | DIASTOLIC BLOOD PRESSURE: 77 MMHG | WEIGHT: 173.94 LBS | HEIGHT: 66 IN | OXYGEN SATURATION: 97 % | SYSTOLIC BLOOD PRESSURE: 122 MMHG | TEMPERATURE: 98 F

## 2022-05-09 VITALS
OXYGEN SATURATION: 98 % | RESPIRATION RATE: 16 BRPM | HEART RATE: 60 BPM | DIASTOLIC BLOOD PRESSURE: 76 MMHG | TEMPERATURE: 98 F | SYSTOLIC BLOOD PRESSURE: 120 MMHG

## 2022-05-09 DIAGNOSIS — I10 ESSENTIAL (PRIMARY) HYPERTENSION: ICD-10-CM

## 2022-05-09 DIAGNOSIS — M54.40 LUMBAGO WITH SCIATICA, UNSPECIFIED SIDE: ICD-10-CM

## 2022-05-09 DIAGNOSIS — E78.5 HYPERLIPIDEMIA, UNSPECIFIED: ICD-10-CM

## 2022-05-09 DIAGNOSIS — M54.9 DORSALGIA, UNSPECIFIED: ICD-10-CM

## 2022-05-09 PROCEDURE — 99284 EMERGENCY DEPT VISIT MOD MDM: CPT

## 2022-05-09 RX ORDER — CYCLOBENZAPRINE HYDROCHLORIDE 10 MG/1
10 TABLET, FILM COATED ORAL ONCE
Refills: 0 | Status: COMPLETED | OUTPATIENT
Start: 2022-05-09 | End: 2022-05-09

## 2022-05-09 RX ORDER — LISINOPRIL 2.5 MG/1
0 TABLET ORAL
Qty: 0 | Refills: 0 | DISCHARGE

## 2022-05-09 RX ORDER — INDAPAMIDE 1.25 MG
0 TABLET ORAL
Qty: 0 | Refills: 0 | DISCHARGE

## 2022-05-09 RX ORDER — CYCLOBENZAPRINE HYDROCHLORIDE 10 MG/1
1 TABLET, FILM COATED ORAL
Qty: 21 | Refills: 0
Start: 2022-05-09 | End: 2022-05-15

## 2022-05-09 RX ORDER — LIDOCAINE 4 G/100G
1 CREAM TOPICAL ONCE
Refills: 0 | Status: COMPLETED | OUTPATIENT
Start: 2022-05-09 | End: 2022-05-09

## 2022-05-09 RX ADMIN — LIDOCAINE 1 PATCH: 4 CREAM TOPICAL at 13:23

## 2022-05-09 RX ADMIN — Medication 500 MILLIGRAM(S): at 13:23

## 2022-05-09 RX ADMIN — CYCLOBENZAPRINE HYDROCHLORIDE 10 MILLIGRAM(S): 10 TABLET, FILM COATED ORAL at 13:23

## 2022-05-09 NOTE — ED ADULT NURSE REASSESSMENT NOTE - NS ED NURSE REASSESS COMMENT FT1
Pt AAOx3. C/o right sided back pain that started this morning at 5am. Pt rates pain 10/10 at this time, MD informed, and medications were given per MAR orders, will reassess to determine if medication gives pt relief.

## 2022-05-09 NOTE — ED ADULT NURSE NOTE - NS ED NURSE RECORD ANOTHER VITAL SIGN
What Type Of Note Output Would You Prefer (Optional)?: Standard Output How Severe Is Your Skin Lesion?: moderate Has Your Skin Lesion Been Treated?: not been treated Is This A New Presentation, Or A Follow-Up?: Skin Lesion Yes

## 2022-05-09 NOTE — ED PROVIDER NOTE - NSFOLLOWUPINSTRUCTIONS_ED_ALL_ED_FT
Rest, drink plenty of fluids.  Advance activity as tolerated.  Continue all previously prescribed medications as directed.  Follow up with your primary care physician in 48-72 hours- bring copies of your results.  Return to the ER for worsening or persistent symptoms, and/or ANY NEW OR CONCERNING SYMPTOMS. If you have issues obtaining follow up, please call: [phone] to obtain a doctor or specialist who takes your insurance in your area.  You may call [phone] to make an appointment with the internal medicine clinic.    Please follow up with the spine orthopedic

## 2022-05-09 NOTE — ED PROVIDER NOTE - NS CPE EDP MUSC LUMBAR LOC
full ROM of the lumbar and sacral area, neurovascular intact. 5/5 strength in the lower back, neurovascular intact. ambulating with steady gait./tenderness

## 2022-05-09 NOTE — ED PROVIDER NOTE - OBJECTIVE STATEMENT
this is 66 y.o female with a PMhx of HTN, HLD presented to the ED complaining back pain radiating down her leg, patient states that she has had this pain for the past several years intermittently, she denies having any trauma to the area, she denies having any falls. She states that she is able to ambulate without difficulties, she denies having any nausea, vomiting, diarrhea, constipation, urinary urgency frequency or dysuria. She denies having any saddle parasthesias, bladder or bowel incontinence.

## 2022-05-09 NOTE — ED PROVIDER NOTE - PATIENT PORTAL LINK FT
You can access the FollowMyHealth Patient Portal offered by [facility] by registering at the following website: [email or web address]. By joining [employer]’s FollowMyHealth portal, you will also be able to view your health information using other applications (apps) compatible with our system.

## 2022-05-09 NOTE — ED ADULT NURSE NOTE - OBJECTIVE STATEMENT
patient A&Ox3 in no acute distress c/o of R hip pain no radiating, started this morning denied dysuria no blood in urine no injury history of RA

## 2022-05-09 NOTE — ED PROVIDER NOTE - CLINICAL SUMMARY MEDICAL DECISION MAKING FREE TEXT BOX
this is 66 y.o female with a PMhx of HTN, HLD presented to the ED complaining back pain radiating down her leg, patient states that she has had this pain for the past several years intermittently, pain control f/u with orthopedics.

## 2022-05-09 NOTE — ED PROVIDER NOTE - CARE PROVIDER_API CALL
Orlando Montilla)  Orthopaedic Surgery  [address], [address]  [place] [address]  Phone: [phone]  Fax: [phone]  Follow Up Time:

## 2022-05-09 NOTE — ED PROVIDER NOTE - CROS ED CONS ALL NEG
negative... Skin Substitute Paste Text: The defect edges were debeveled with a #15 scalpel blade.  Given the location of the defect, shape of the defect and the proximity to free margins a skin substitute micronized graft was deemed most appropriate.  The entire vial contents were admixed with 0.5ccs of sterile saline, formed into a paste and then evenly spread over the entire wound bed.

## 2024-12-13 NOTE — ED ADULT TRIAGE NOTE - TEMPERATURE IN CELSIUS (DEGREES C)
complete occlusion GEOFF, (+) collateral flow, stable per cardiologist, ECHO reviewed. 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 36.8